# Patient Record
Sex: MALE | Race: WHITE | NOT HISPANIC OR LATINO | Employment: OTHER | URBAN - METROPOLITAN AREA
[De-identification: names, ages, dates, MRNs, and addresses within clinical notes are randomized per-mention and may not be internally consistent; named-entity substitution may affect disease eponyms.]

---

## 2017-04-13 ENCOUNTER — OFFICE VISIT (OUTPATIENT)
Dept: DERMATOLOGY | Facility: CLINIC | Age: 82
End: 2017-04-13

## 2017-04-13 DIAGNOSIS — D48.5 NEOPLASM OF UNCERTAIN BEHAVIOR OF SKIN: Primary | ICD-10-CM

## 2017-04-13 DIAGNOSIS — L81.4 SOLAR LENTIGO: ICD-10-CM

## 2017-04-13 DIAGNOSIS — L90.5 SCAR: ICD-10-CM

## 2017-04-13 DIAGNOSIS — Z85.828 HISTORY OF BASAL CELL CARCINOMA: ICD-10-CM

## 2017-04-13 DIAGNOSIS — L82.1 SEBORRHEIC KERATOSIS: ICD-10-CM

## 2017-04-13 PROCEDURE — 88305 TISSUE EXAM BY PATHOLOGIST: CPT | Performed by: DERMATOLOGY

## 2017-04-13 RX ORDER — LIDOCAINE HYDROCHLORIDE AND EPINEPHRINE 10; 10 MG/ML; UG/ML
3 INJECTION, SOLUTION INFILTRATION; PERINEURAL ONCE
Qty: 3 ML | Refills: 0 | OUTPATIENT
Start: 2017-04-13 | End: 2017-04-13

## 2017-04-13 ASSESSMENT — PAIN SCALES - GENERAL
PAINLEVEL: NO PAIN (0)
PAINLEVEL: NO PAIN (0)

## 2017-04-13 NOTE — MR AVS SNAPSHOT
After Visit Summary   4/13/2017    Gerard Manuel    MRN: [id number]           Patient Information     Date Of Birth          1935        Visit Information        Provider Department      4/13/2017 2:45 PM Basil Stone MD [facility] Dermatology        Today's Diagnoses     Neoplasm of uncertain behavior of skin    -  1    Scar        History of basal cell carcinoma        Seborrheic keratosis          Care Instructions    Sun Protection    We recommend daily use of sunscreen of SPF 30 or higher, reapplied every 2 hours when outside or sooner if swimming or sweating. It is best to try to avoid prolonged midday sun whenever possibly, and wear wide-brimmed hats, long-sleeved shirts, particular when outdoors for extended time periods.      Wound Care After a Biopsy    What is a skin biopsy?  A skin biopsy allows the doctor to examine a very small piece of tissue under the microscope to determine the diagnosis and the best treatment for the skin condition. A local anesthetic (numbing medicine)  is injected with a very small needle into the skin area to be tested. A small piece of skin is taken from the area. Sometimes a suture (stitch) is used.     What are the risks of a skin biopsy?  I will experience scar, bleeding, swelling, pain, crusting and redness. I may experience incomplete removal or recurrence. Risks of this procedure are excessive bleeding, bruising, infection, nerve damage, numbness, thick (hypertrophic or keloidal) scar and non-diagnostic biopsy.    How should I care for my wound for the first 24 hours?    Keep the wound dry and covered for 24 hours    If it bleeds, hold direct pressure on the area for 15 minutes. If bleeding does not stop then go to the emergency room    Avoid strenuous exercise the first 1-2 days or as your doctor instructs you    How should I care for the wound after 24 hours?    After 24 hours, remove the bandage    You may bathe or shower as normal    If you had  a scalp biopsy, you can shampoo as usual and can use shower water to clean the biopsy site daily    Clean the wound twice a day with gentle soap and water    Do not scrub, be gentle    Apply white petroleum/Vaseline after cleaning the wound with a cotton swab or a clean finger, and keep the site covered with a Bandaid /bandage. Bandages are not necessary with a scalp biopsy    If you are unable to cover the site with a Bandaid /bandage, re-apply ointment 2-3 times a day to keep the site moist. Moisture will help with healing    Avoid strenuous activity for first 1-2 days    Avoid lakes, rivers, pools, and oceans until the stitches are removed or the site is healed    How do I clean my wound?    Wash hands for 15 minutes with soap or use hand  before all wound care    Clean the wound with gentle soap and water    Apply white petroleum/Vaseline  to wound after it is clean    Replace the Bandaid /bandage to keep the wound covered for the first few days or as instructed by your doctor    If you had a scalp biopsy, warm shower water to the area on a daily basis should suffice    What should I use to clean my wound?     Cotton-tipped applicators (Qtips )    White petroleum jelly (Vaseline ). Use a clean new container and use Q-tips to apply.    Bandaids   as needed    Gentle soap     How should I care for my wound long term?    Do not get your wound dirty    Keep up with wound care for one week or until the area is healed.    A small scab will form and fall off by itself when the area is completely healed. The area will be red and will become pink in color as it heals. Sun protection is very important for how your scar will turn out. Sunscreen with an SPF 30 or greater is recommended once the area is healed.    If you have stitches, stitches need to be removed in 0 days. You may return to our clinic for this or you may have it done locally at your doctor s office.    You should have some soreness but it should be  mild and slowly go away over several days. Talk to your doctor about using tylenol for pain,    When should I call my doctor?  If you have increased:     Pain or swelling    Pus or drainage (clear or slightly yellow drainage is ok)    Temperature over 100F    Spreading redness or warmth around wound    When will I hear about my results?  The biopsy results can take 2-3 weeks to come back. The clinic will call you with the results, send you a [employer] message, or have you schedule a follow-up clinic or phone time to discuss the results. Contact our clinics if you do not hear from us in 3 weeks.     Who should I call with questions?    [facility]: [phone]     [facility]: [phone]    For urgent needs outside of business hours call the [facility] at [phone] and ask for the dermatology resident on call            Follow-ups after your visit        Follow-up notes from your care team     Return in about 6 months (around 10/13/2017).      Who to contact     Please call your clinic at [phone] to:    Ask questions about your health    Make or cancel appointments    Discuss your medicines    Learn about your test results    Speak to your doctor   If you have compliments or concerns about an experience at your clinic, or if you wish to file a complaint, please contact [facility] Patient Relations at [phone] or email us at [email or web address]         Additional Information About Your Visit        [employer] Information     [employer] is an electronic gateway that provides easy, online access to your medical records. With [employer], you can request a clinic appointment, read your test results, renew a prescription or communicate with your care team.     To sign up for [employer] visit the website at [email or web address]   You will be asked to enter the access code listed below, as well as some  personal information. Please follow the directions to create your username and password.     Your access code is: [email or web address]  Expires: 2017  6:30 AM     Your access code will  in 90 days. If you need help or a new code, please contact your [facility] or call [phone] for assistance.        Care EveryWhere ID     This is your Care EveryWhere ID. This could be used by other organizations to access your [facility] medical records  [id number]         Blood Pressure from Last 3 Encounters:   10/07/15 113/69    Weight from Last 3 Encounters:   No data found for Wt              We Performed the Following     BIOPSY SKIN/SUBQ/MUC MEM, SINGLE LESION     Surgical pathology exam          Today's Medication Changes          These changes are accurate as of: 17  3:53 PM.  If you have any questions, ask your nurse or doctor.               Start taking these medicines.        Dose/Directions    lidocaine 1% with EPINEPHrine 1:100,000 1 %-1:153396 injection   Used for:  Neoplasm of uncertain behavior of skin   Started by:  Basil Stone MD        Dose:  3 mL   Inject 3 mLs into the skin once for 1 dose   Quantity:  3 mL   Refills:  0            Where to get your medicines      Some of these will need a paper prescription and others can be bought over the counter.  Ask your nurse if you have questions.     You don't need a prescription for these medications     lidocaine 1% with EPINEPHrine 1:100,000 1 %-1:839571 injection                Primary Care Provider    No Ref-Primary Verified       No address on file        Thank you!     Thank you for choosing [facility] DERMATOLOGY  for your care. Our goal is always to provide you with excellent care. Hearing back from our patients is one way we can continue to improve our services. Please take a few minutes to complete the written survey that you may receive in the mail after your visit with us. Thank you!             Your Updated  Medication List - Protect others around you: Learn how to safely use, store and throw away your medicines at www.disposemymeds.org.          This list is accurate as of: 4/13/17  3:53 PM.  Always use your most recent med list.                   Brand Name Dispense Instructions for use    aspirin 325 MG tablet      Take 81 mg by mouth daily       atenolol 25 MG tablet    TENORMIN     Take 25 mg by mouth daily       gemfibrozil 600 MG tablet    LOPID     Take by mouth 2 times daily (before meals)       lidocaine 1% with EPINEPHrine 1:100,000 1 %-1:621939 injection     3 mL    Inject 3 mLs into the skin once for 1 dose       OMEGA-3 FISH OIL PO          simvastatin 40 MG tablet    ZOCOR     Take by mouth At Bedtime

## 2017-04-13 NOTE — PROGRESS NOTES
"[facility] Dermatology Note      Dermatology Problem List:  1. H/o basal cell carcinoma  -s/p excision in 2015,   Pt previously unhappy with the scar and with difficulty breathing through that nostril S/p ILK to scar, now content  2. Seborrheic keratoses  3. NUB biopsied 4/13/17  - Left dorsal hand- irregularly shaped grey brown macule ~ 1.5 cm in diameter with grey granules on dermoscopy concerning for LM vs solar lentigo    Encounter Date: Apr 13, 2017    CC:  Chief Complaint   Patient presents with     Derm Problem     Gerard is here today for a skin check. He states \" I have multiple areas that concerns me today\"         History of Present Illness:  Mr. Gerard Manuel is a 81 year old male with a history of basal cell carcinoma, excised in 2015, who returns for a follow-up skin examination. He has was seen 6 months ago when he was noted to be in good health with no suspicious skin lesions however he was bothered by his scar. He subsequently saw Dr. Simon and had ILK to the scar. He denies any residual concerns about the scar or his breathing today.  Otherwise, he notes no new lesions and has no other skin concerns. Continues to wear sunscreen and a hat.     Past Medical History:   Patient Active Problem List   Diagnosis     Blepharitis     BCC (basal cell carcinoma), face     Inflamed seborrheic keratosis     Tinea pedis     History of basal cell carcinoma     Seborrheic keratoses, inflamed     Hypertrophic scar     Past Medical History:   Diagnosis Date     Arthritis      Blepharitis      Cataracts, both eyes      MGD (meibomian gland dysfunction)      Past Surgical History:   Procedure Laterality Date     Quadruple Bypass Surgery  1992       Family History:  - Sibling history (brother) of skin cancer (BCC)    Medications:  Current Outpatient Prescriptions   Medication Sig Dispense Refill     atenolol (TENORMIN) 25 MG tablet Take 25 mg by mouth daily       gemfibrozil (LOPID) 600 MG " tablet Take by mouth 2 times daily (before meals)       simvastatin (ZOCOR) 40 MG tablet Take by mouth At Bedtime       aspirin 325 MG tablet Take 81 mg by mouth daily       Omega-3 Fatty Acids (OMEGA-3 FISH OIL PO)        No Known Allergies      Review of Systems:  -As per HPI  -Constitutional: The patient denies fatigue, fevers, chills, unintended weight loss, and night sweats.  -HEENT: Patient denies nonhealing oral sores.  -Skin: As above in HPI. No additional skin concerns.    Physical exam:  Vitals: There were no vitals taken for this visit.  GEN: This is a well developed, well-nourished male in no acute distress, in a pleasant mood.    SKIN: Full skin, which includes the head/face, both arms, chest, back, abdomen,both legs, genitalia and/or groin buttocks, digits and/or nails, was examined.  -Nose: well-healed scar with some hypopigmentation but no nodularity or erythema.   -Right ear: Firm, flesh colored smooth papule with no erythema or crusting on the inner aspect of the sunny  -Body - numerous scattered waxy, stuck-on hyperkeratotic tan to brown papules on chest, back, upper and lower extremities, left temple, forehead  - Few uniformly pigmented brown macules on bilateral upper extremities.   - Few small, dome shaped cherry red papules on body.   - Left dorsal hand- irregularly shaped grey brown macule ~ 1.5 cm in diameter with grey granules on dermoscopy  -No other lesions of concern on areas examined.     Impression/Plan:  1. History of basal cell carcinoma. No current signs of recurrence. Pt previously unhappy with the scar and with difficulty breathing through that nostril S/p ILK to scar. He is not bothered by the appearance cosmetically and denies any difficulty breathing through that nostril today. Will not pursue additional treatment  - sun protection discussed    2. Cherry angioma(s), Multiple clinically benign nevi on the body, Seborrheic keratosis, non irritated and Solar lentigines    Sun  precaution was advised including the use of sun screens of SPF 30 or higher, sun protective clothing, and avoidance of tanning beds.    Benign nature discussed. No further interventions  Needed.     3. NUB biopsied 4/13/17  - - Left dorsal hand- irregularly shaped grey brown macule ~ 1.5 cm in diameter with grey granules on dermoscopy concerning for LM vs solar lentigo  Procedure Note: Biopsy by shave technique  The risks and benefits of the procedure were described to the patient. These include but are not limited to bleeding, infection, scar, incomplete removal, and non-diagnostic biopsy. Written informed consent was obtained. The NUB was cleansed with an alcohol pad and injected with 1% lidocaine with epinephrine buffered with sodium bicarbonate. Once anesthesia was obtained, a biopsy was performed with Gilette blade. The tissue was placed in a labeled container with formalin and sent to pathology. Hemostasis was achieved with aluminum chloride. Vaseline and a bandage were applied to the wound. The patient tolerated the procedure well and was given post biopsy care instructions.        Follow-up in 6 months, earlier for new or changing lesions.     Staff Involved:  Jessie Ibrahim MD (resident), Basil Stone MD (Staff)    I have seen and examined this patient and agree with the assessment and plan as documented in the resident's note, and was present for all procedures.    Basil Stone MD  Dermatology Attending

## 2017-04-13 NOTE — PATIENT INSTRUCTIONS
Sun Protection    We recommend daily use of sunscreen of SPF 30 or higher, reapplied every 2 hours when outside or sooner if swimming or sweating. It is best to try to avoid prolonged midday sun whenever possibly, and wear wide-brimmed hats, long-sleeved shirts, particular when outdoors for extended time periods.      Wound Care After a Biopsy    What is a skin biopsy?  A skin biopsy allows the doctor to examine a very small piece of tissue under the microscope to determine the diagnosis and the best treatment for the skin condition. A local anesthetic (numbing medicine)  is injected with a very small needle into the skin area to be tested. A small piece of skin is taken from the area. Sometimes a suture (stitch) is used.     What are the risks of a skin biopsy?  I will experience scar, bleeding, swelling, pain, crusting and redness. I may experience incomplete removal or recurrence. Risks of this procedure are excessive bleeding, bruising, infection, nerve damage, numbness, thick (hypertrophic or keloidal) scar and non-diagnostic biopsy.    How should I care for my wound for the first 24 hours?    Keep the wound dry and covered for 24 hours    If it bleeds, hold direct pressure on the area for 15 minutes. If bleeding does not stop then go to the emergency room    Avoid strenuous exercise the first 1-2 days or as your doctor instructs you    How should I care for the wound after 24 hours?    After 24 hours, remove the bandage    You may bathe or shower as normal    If you had a scalp biopsy, you can shampoo as usual and can use shower water to clean the biopsy site daily    Clean the wound twice a day with gentle soap and water    Do not scrub, be gentle    Apply white petroleum/Vaseline after cleaning the wound with a cotton swab or a clean finger, and keep the site covered with a Bandaid /bandage. Bandages are not necessary with a scalp biopsy    If you are unable to cover the site with a Bandaid /bandage,  re-apply ointment 2-3 times a day to keep the site moist. Moisture will help with healing    Avoid strenuous activity for first 1-2 days    Avoid lakes, rivers, pools, and oceans until the stitches are removed or the site is healed    How do I clean my wound?    Wash hands for 15 minutes with soap or use hand  before all wound care    Clean the wound with gentle soap and water    Apply white petroleum/Vaseline  to wound after it is clean    Replace the Bandaid /bandage to keep the wound covered for the first few days or as instructed by your doctor    If you had a scalp biopsy, warm shower water to the area on a daily basis should suffice    What should I use to clean my wound?     Cotton-tipped applicators (Qtips )    White petroleum jelly (Vaseline ). Use a clean new container and use Q-tips to apply.    Bandaids   as needed    Gentle soap     How should I care for my wound long term?    Do not get your wound dirty    Keep up with wound care for one week or until the area is healed.    A small scab will form and fall off by itself when the area is completely healed. The area will be red and will become pink in color as it heals. Sun protection is very important for how your scar will turn out. Sunscreen with an SPF 30 or greater is recommended once the area is healed.    If you have stitches, stitches need to be removed in 0 days. You may return to our clinic for this or you may have it done locally at your doctor s office.    You should have some soreness but it should be mild and slowly go away over several days. Talk to your doctor about using tylenol for pain,    When should I call my doctor?  If you have increased:     Pain or swelling    Pus or drainage (clear or slightly yellow drainage is ok)    Temperature over 100F    Spreading redness or warmth around wound    When will I hear about my results?  The biopsy results can take 2-3 weeks to come back. The clinic will call you with the results, send  you a mychart message, or have you schedule a follow-up clinic or phone time to discuss the results. Contact our clinics if you do not hear from us in 3 weeks.     Who should I call with questions?    [facility]: [phone]     [facility]: [phone]    For urgent needs outside of business hours call the [facility] at [phone] and ask for the dermatology resident on call

## 2017-04-13 NOTE — LETTER
"4/13/2017       RE: Gerard Manuel  [address] [place] [address]     Dear Colleague,    Thank you for referring your patient, Gerard Manuel, to the [facility] DERMATOLOGY at [facility]. Please see a copy of my visit note below.        Pictures taken of patient today to be placed in chart for future reference.    [facility] Dermatology Note      Dermatology Problem List:  1. H/o basal cell carcinoma  -s/p excision in 2015,   Pt previously unhappy with the scar and with difficulty breathing through that nostril S/p ILK to scar, now content  2. Seborrheic keratoses  3. NUB biopsied 4/13/17  - Left dorsal hand- irregularly shaped grey brown macule ~ 1.5 cm in diameter with grey granules on dermoscopy concerning for LM vs solar lentigo    Encounter Date: Apr 13, 2017    CC:  Chief Complaint   Patient presents with     Derm Problem     Gerard is here today for a skin check. He states \" I have multiple areas that concerns me today\"         History of Present Illness:  Mr. Gerard Manuel is a 81 year old male with a history of basal cell carcinoma, excised in 2015, who returns for a follow-up skin examination. He has was seen 6 months ago when he was noted to be in good health with no suspicious skin lesions however he was bothered by his scar. He subsequently saw Dr. Simon and had ILK to the scar. He denies any residual concerns about the scar or his breathing today.  Otherwise, he notes no new lesions and has no other skin concerns. Continues to wear sunscreen and a hat.     Past Medical History:   Patient Active Problem List   Diagnosis     Blepharitis     BCC (basal cell carcinoma), face     Inflamed seborrheic keratosis     Tinea pedis     History of basal cell carcinoma     Seborrheic keratoses, inflamed     Hypertrophic scar     Past Medical History:   Diagnosis Date     Arthritis      Blepharitis      Cataracts, both eyes      MGD " (meibomian gland dysfunction)      Past Surgical History:   Procedure Laterality Date     Quadruple Bypass Surgery  1992       Family History:  - Sibling history (brother) of skin cancer (BCC)    Medications:  Current Outpatient Prescriptions   Medication Sig Dispense Refill     atenolol (TENORMIN) 25 MG tablet Take 25 mg by mouth daily       gemfibrozil (LOPID) 600 MG tablet Take by mouth 2 times daily (before meals)       simvastatin (ZOCOR) 40 MG tablet Take by mouth At Bedtime       aspirin 325 MG tablet Take 81 mg by mouth daily       Omega-3 Fatty Acids (OMEGA-3 FISH OIL PO)        No Known Allergies      Review of Systems:  -As per HPI  -Constitutional: The patient denies fatigue, fevers, chills, unintended weight loss, and night sweats.  -HEENT: Patient denies nonhealing oral sores.  -Skin: As above in HPI. No additional skin concerns.    Physical exam:  Vitals: There were no vitals taken for this visit.  GEN: This is a well developed, well-nourished male in no acute distress, in a pleasant mood.    SKIN: Full skin, which includes the head/face, both arms, chest, back, abdomen,both legs, genitalia and/or groin buttocks, digits and/or nails, was examined.  -Nose: well-healed scar with some hypopigmentation but no nodularity or erythema.   -Right ear: Firm, flesh colored smooth papule with no erythema or crusting on the inner aspect of the sunny  -Body - numerous scattered waxy, stuck-on hyperkeratotic tan to brown papules on chest, back, upper and lower extremities, left temple, forehead  - Few uniformly pigmented brown macules on bilateral upper extremities.   - Few small, dome shaped cherry red papules on body.   - Left dorsal hand- irregularly shaped grey brown macule ~ 1.5 cm in diameter with grey granules on dermoscopy  -No other lesions of concern on areas examined.     Impression/Plan:  1. History of basal cell carcinoma. No current signs of recurrence. Pt previously unhappy with the scar and with  difficulty breathing through that nostril S/p ILK to scar. He is not bothered by the appearance cosmetically and denies any difficulty breathing through that nostril today. Will not pursue additional treatment  - sun protection discussed    2. Cherry angioma(s), Multiple clinically benign nevi on the body, Seborrheic keratosis, non irritated and Solar lentigines    Sun precaution was advised including the use of sun screens of SPF 30 or higher, sun protective clothing, and avoidance of tanning beds.    Benign nature discussed. No further interventions  Needed.     3. NUB biopsied 4/13/17  - - Left dorsal hand- irregularly shaped grey brown macule ~ 1.5 cm in diameter with grey granules on dermoscopy concerning for LM vs solar lentigo  Procedure Note: Biopsy by shave technique  The risks and benefits of the procedure were described to the patient. These include but are not limited to bleeding, infection, scar, incomplete removal, and non-diagnostic biopsy. Written informed consent was obtained. The NUB was cleansed with an alcohol pad and injected with 1% lidocaine with epinephrine buffered with sodium bicarbonate. Once anesthesia was obtained, a biopsy was performed with Gilette blade. The tissue was placed in a labeled container with formalin and sent to pathology. Hemostasis was achieved with aluminum chloride. Vaseline and a bandage were applied to the wound. The patient tolerated the procedure well and was given post biopsy care instructions.        Follow-up in 6 months, earlier for new or changing lesions.     Staff Involved:  Jessie Ibrahim MD (resident), Basil Stone MD (Staff)    I have seen and examined this patient and agree with the assessment and plan as documented in the resident's note, and was present for all procedures.    Basil Stone MD  Dermatology Attending

## 2017-04-13 NOTE — NURSING NOTE
"Dermatology Rooming Note    Gerard Manuel's goals for this visit include:   Chief Complaint   Patient presents with     Derm Problem     Gerard is here today for a skin check. He states \" I have multiple areas that concerns me today\"           DENIA Tamayo    "

## 2017-04-17 LAB — COPATH REPORT: NORMAL

## 2017-05-15 PROBLEM — D48.5 NEOPLASM OF UNCERTAIN BEHAVIOR OF SKIN: Status: ACTIVE | Noted: 2017-05-15

## 2018-02-08 ENCOUNTER — OFFICE VISIT (OUTPATIENT)
Dept: OPHTHALMOLOGY | Facility: CLINIC | Age: 83
End: 2018-02-08
Attending: OPHTHALMOLOGY
Payer: MEDICARE

## 2018-02-08 DIAGNOSIS — H01.006 BLEPHARITIS OF BOTH EYES, UNSPECIFIED EYELID, UNSPECIFIED TYPE: ICD-10-CM

## 2018-02-08 DIAGNOSIS — H01.003 BLEPHARITIS OF BOTH EYES, UNSPECIFIED EYELID, UNSPECIFIED TYPE: ICD-10-CM

## 2018-02-08 DIAGNOSIS — H25.13 AGE-RELATED NUCLEAR CATARACT OF BOTH EYES: Primary | ICD-10-CM

## 2018-02-08 PROCEDURE — 92015 DETERMINE REFRACTIVE STATE: CPT | Mod: GY,ZF

## 2018-02-08 PROCEDURE — 76519 ECHO EXAM OF EYE: CPT | Mod: ZF | Performed by: OPHTHALMOLOGY

## 2018-02-08 PROCEDURE — G0463 HOSPITAL OUTPT CLINIC VISIT: HCPCS | Mod: ZF

## 2018-02-08 ASSESSMENT — REFRACTION_MANIFEST
OS_SPHERE: -0.75
OD_ADD: +2.75
OD_AXIS: 120
OD_CYLINDER: +1.00
OS_AXIS: 145
OS_ADD: +2.75
OD_SPHERE: -1.50
OS_CYLINDER: +0.50

## 2018-02-08 ASSESSMENT — VISUAL ACUITY
METHOD: SNELLEN - LINEAR
OS_BAT_HIGH: 20/40
OS_CC: 20/30
OD_BAT_MED: 20/30
CORRECTION_TYPE: GLASSES
OD_BAT_LOW: 20/25
OS_BAT_MED: 20/40
OS_BAT_LOW: 20/30
OD_BAT_HIGH: 20/40
OD_CC: 20/25

## 2018-02-08 ASSESSMENT — REFRACTION_WEARINGRX
OD_AXIS: 115
OS_SPHERE: -1.00
OS_AXIS: 140
OS_ADD: +2.50
OS_CYLINDER: +0.50
OD_CYLINDER: +1.00
OD_SPHERE: -1.50
OD_ADD: +2.50

## 2018-02-08 ASSESSMENT — EXTERNAL EXAM - RIGHT EYE: OD_EXAM: NORMAL

## 2018-02-08 ASSESSMENT — TONOMETRY
OS_IOP_MMHG: 17
OD_IOP_MMHG: 18
IOP_METHOD: TONOPEN

## 2018-02-08 ASSESSMENT — CONF VISUAL FIELD
OD_NORMAL: 1
METHOD: COUNTING FINGERS
OS_NORMAL: 1

## 2018-02-08 ASSESSMENT — EXTERNAL EXAM - LEFT EYE: OS_EXAM: NORMAL

## 2018-02-08 ASSESSMENT — CUP TO DISC RATIO
OS_RATIO: 0.3
OD_RATIO: 0.3

## 2018-02-08 NOTE — NURSING NOTE
Chief Complaints and History of Present Illnesses   Patient presents with     Yearly Exam     HPI    Symptoms:        Frequency:  Constant       Do you have eye pain now?:  No      Comments:  Noticing that the LE has decreased va at near  Increased fatigue when reading  Lids have been bothering him more.  Has been doing warm compresses but occ.  +red spot occ that comes and goes  Ambreen Escobar COT 8:02 AM February 8, 2018

## 2018-02-08 NOTE — MR AVS SNAPSHOT
After Visit Summary   2/8/2018    Gerard Manuel    MRN: [id number]           Patient Information     Date Of Birth          1935        Visit Information        Provider Department      2/8/2018 7:45 AM Gerardo Peña MD [facility]        Today's Diagnoses     Age-related nuclear cataract of both eyes    -  1    Blepharitis of both eyes, unspecified eyelid, unspecified type           Follow-ups after your visit        Follow-up notes from your care team     Return in about 1 year (around 2/8/2019), or if symptoms worsen or fail to improve, for Annual Visit.      Your next 10 appointments already scheduled     Jul 09, 2018   Procedure with Gerardo Peña MD   [facility] PeriOP Services (--)    [address]  [place] [address]   [phone]            Jul 10, 2018 12:45 PM CDT   Post-Op with Gerardo Peña MD   Eye Clinic ([facility])    [facility] [address] [facility] [address] [place] [address]   [phone]            Jul 17, 2018  8:30 AM CDT   Post-Op with Gerardo Peña MD   Eye Clinic ([facility])    [facility]  [address] [place] [address] St   [facility] [address] [place] [address]   [phone]            Jul 23, 2018   Procedure with Gerardo Peña MD   [facility] (--)    [address]  [place] [address]   [phone]            Jul 24, 2018 10:00 AM CDT   Post-Op with Gerardo Peña MD   Eye Clinic ([facility])    [facility] [address]   [facility] [address] [place] [address]   [phone]            Jul 31, 2018  8:30 AM CDT   Post-Op with Gerardo Peña MD   Eye Clinic ([facility])    [facility] [address]   [facility] [address] [place] [address]   [phone]            Aug 21, 2018  8:30 AM CDT   Post-Op with Gerardo Peña MD   Eye Clinic ([facility]  Clinics)    [address]  [address] Clin [address]  [facility] [address]   [phone]              Who to contact     Please call your clinic at [phone] to:    Ask questions about your health    Make or cancel appointments    Discuss your medicines    Learn about your test results    Speak to your doctor            Additional Information About Your Visit        MyChart Information     [employer] is an electronic gateway that provides easy, online access to your medical records. With [employer], you can request a clinic appointment, read your test results, renew a prescription or communicate with your care team.     To sign up for [employer] visit the website at [email or web address]   You will be asked to enter the access code listed below, as well as some personal information. Please follow the directions to create your username and password.     Your access code is: [email or web address]  Expires: 2018  2:20 PM     Your access code will  in 90 days. If you need help or a new code, please contact your [facility] or call [phone] for assistance.        Care EveryWhere ID     This is your Care EveryWhere ID. This could be used by other organizations to access your [facility] medical records  [id number]         Blood Pressure from Last 3 Encounters:   10/07/15 113/69    Weight from Last 3 Encounters:   No data found for Wt              We Performed the Following     IOL Biometry w/ IOL calc OU (both eye)        Primary Care Provider Fax #    Physician No Ref-Primary [phone]       No address on file        Equal Access to Services     JOYA RED : Hadii arleth aco Somark, waaxda luqadaha, qaybta kaalmada adeegyada, wax jesus manley . So [facility] [phone].    ATENCIÓN: Si habla español, tiene a clark disposición servicios gratuitos de asistencia lingüística. Llame al [phone].    We comply with applicable  federal civil rights laws and [place] laws. We do not discriminate on the basis of race, color, national origin, age, disability, sex, sexual orientation, or gender identity.            Thank you!     Thank you for choosing EYE CLINIC  for your care. Our goal is always to provide you with excellent care. Hearing back from our patients is one way we can continue to improve our services. Please take a few minutes to complete the written survey that you may receive in the mail after your visit with us. Thank you!             Your Updated Medication List - Protect others around you: Learn how to safely use, store and throw away your medicines at www.disposemymeds.org.          This list is accurate as of 2/8/18 11:59 PM.  Always use your most recent med list.                   Brand Name Dispense Instructions for use Diagnosis    aspirin 325 MG tablet      Take 81 mg by mouth daily        atenolol 25 MG tablet    TENORMIN     Take 50 mg by mouth daily        OMEGA-3 FISH OIL PO           rivaroxaban ANTICOAGULANT 20 MG Tabs tablet    XARELTO     Take 20 mg by mouth daily (with dinner)        simvastatin 40 MG tablet    ZOCOR     Take by mouth At Bedtime

## 2018-02-08 NOTE — PROGRESS NOTES
Gerard Manuel is a 82 year old male presenting for annual exam.     Patient reports new vision changes both eyes . Noticing that he is having more difficulty seeing up close. Specifically he notes that his left eye is not as strong as his right eye at near. He notes some problems with glare/halos at night especially when driving in the rain, but this is not always present. He feels his distance vision is doing well. Eyes are sometimes irritated, however admits he is not using warm compresses as should. No eye pain.      Of note: Started xarelto in November 2017 for atrial flutter.     Ocular medications:  Warm compresses as needed     1. nuclear sclerosis Cataract both eyes,    Visually significant BAT to 20/40 both eyes    Best corrected visual acuity 20/30 both eyes    Meets threshold for surgery    2 Blepharitis both eyes   Discussed increase warm compresses, lid hygiene twice a day    Artificial tears prn     3. Posterior vitreous detachment both eyes    Asymptomatic, no retinal tears, detachment   Retinal detachment precautions discussed (return for new flashes, floaters, curtain over vision)    4. Refractive error   Manifest refraction done today     May call to schedule surgery if desired  Will do biometry today and keep on file  Dory Bhagat's contact info    Would be:  MAC / top  emmetropia    Return to clinic in 1 year, earlier for surgery as indicated by patient     Jessie Slaughter MD  Ophthalmology PGY3       ~~~~~~~~~~~~~~~~~~~~~~~~~~~~~~~~~~~~~~~~~~~~~~~~~~~~~~~~~~~~~~~~    Complete documentation of historical and exam elements from today's encounter can be found in the full encounter summary report (not reduplicated in this progress note). I personally obtained the chief complaint(s) and history of present illness.  I confirmed and edited as necessary the review of systems, past medical/surgical history, family history, social history, and examination findings as documented by others.  I examined the  patient myself, and I personally reviewed the relevant tests, images, and reports as documented above. I formulated and edited as necessary the assessment and plan and discussed the findings and management plan with the patient and family.     Gerardo Peña MD, MA  Director, Cornea & Anterior Segment  [facility] ER Department of Ophthalmology & Visual Neuroscience

## 2018-05-07 ENCOUNTER — TELEPHONE (OUTPATIENT)
Dept: OPHTHALMOLOGY | Facility: CLINIC | Age: 83
End: 2018-05-07

## 2018-05-07 DIAGNOSIS — H25.13 NUCLEAR SENILE CATARACT OF BOTH EYES: Primary | ICD-10-CM

## 2018-06-06 ENCOUNTER — TELEPHONE (OUTPATIENT)
Dept: OPHTHALMOLOGY | Facility: CLINIC | Age: 83
End: 2018-06-06

## 2018-06-08 ENCOUNTER — TELEPHONE (OUTPATIENT)
Dept: OPHTHALMOLOGY | Facility: CLINIC | Age: 83
End: 2018-06-08

## 2018-06-08 NOTE — TELEPHONE ENCOUNTER
Health Call Center    Phone Message    May a detailed message be left on voicemail: yes    Reason for Call: Other: Pt is returning a phone call regarding pt's questions about lens that they will be putting in for pt's cataract removal. Please call Pt on his cell phone. Pt will try to answer call all day today and monday. Please leave a detailed vm if no answer.      Action Taken: Message routed to:  [facility] ([facility] [[email or web address]] O[facility] ADULT [facility]

## 2018-06-08 NOTE — TELEPHONE ENCOUNTER
Note to dr. Cooley who left message with pt earlier to discuss multifocal lens  Per Wilson RN 9:55 AM 06/08/18    See attached message below

## 2018-06-11 ENCOUNTER — TELEPHONE (OUTPATIENT)
Dept: OPHTHALMOLOGY | Facility: CLINIC | Age: 83
End: 2018-06-11

## 2018-06-11 NOTE — TELEPHONE ENCOUNTER
Discussed option of multifocal lens with patient.  He would be a good candidate based on the last exam note.  Discussed the benefits and limitations of the lenses.  He is interested and would like to learn more about the cost of the lens.  Would plan for Symfony if interested.    Cesar Cooley, DO  Cornea Fellow

## 2018-06-18 ENCOUNTER — TELEPHONE (OUTPATIENT)
Dept: OPHTHALMOLOGY | Facility: CLINIC | Age: 83
End: 2018-06-18

## 2018-07-02 DIAGNOSIS — H25.12 NUCLEAR SENILE CATARACT OF LEFT EYE: Primary | ICD-10-CM

## 2018-07-02 RX ORDER — KETOROLAC TROMETHAMINE 5 MG/ML
1 SOLUTION OPHTHALMIC 4 TIMES DAILY
Qty: 1 BOTTLE | Refills: 0 | Status: SHIPPED | OUTPATIENT
Start: 2018-07-02 | End: 2019-03-05

## 2018-07-02 RX ORDER — PREDNISOLONE ACETATE 10 MG/ML
1 SUSPENSION/ DROPS OPHTHALMIC 4 TIMES DAILY
Qty: 1 BOTTLE | Refills: 0 | Status: SHIPPED | OUTPATIENT
Start: 2018-07-02 | End: 2019-03-05

## 2018-07-02 RX ORDER — OFLOXACIN 3 MG/ML
1 SOLUTION/ DROPS OPHTHALMIC 4 TIMES DAILY
Qty: 1 BOTTLE | Refills: 0 | Status: SHIPPED | OUTPATIENT
Start: 2018-07-02 | End: 2019-03-05

## 2018-07-09 ENCOUNTER — SURGERY (OUTPATIENT)
Age: 83
End: 2018-07-09

## 2018-07-09 ENCOUNTER — HOSPITAL ENCOUNTER (OUTPATIENT)
Facility: CLINIC | Age: 83
Discharge: HOME | End: 2018-07-09
Attending: OPHTHALMOLOGY | Admitting: OPHTHALMOLOGY
Payer: MEDICARE

## 2018-07-09 ENCOUNTER — ANESTHESIA EVENT (OUTPATIENT)
Dept: SURGERY | Facility: CLINIC | Age: 83
End: 2018-07-09
Payer: MEDICARE

## 2018-07-09 ENCOUNTER — ANESTHESIA (OUTPATIENT)
Dept: SURGERY | Facility: CLINIC | Age: 83
End: 2018-07-09
Payer: MEDICARE

## 2018-07-09 VITALS
WEIGHT: 171 LBS | RESPIRATION RATE: 15 BRPM | BODY MASS INDEX: 23.94 KG/M2 | SYSTOLIC BLOOD PRESSURE: 113 MMHG | HEIGHT: 71 IN | OXYGEN SATURATION: 95 % | DIASTOLIC BLOOD PRESSURE: 69 MMHG | TEMPERATURE: 97.4 F

## 2018-07-09 DIAGNOSIS — H25.12 NUCLEAR SCLEROTIC CATARACT OF LEFT EYE: Primary | ICD-10-CM

## 2018-07-09 PROCEDURE — 25000128 H RX IP 250 OP 636: Performed by: OPHTHALMOLOGY

## 2018-07-09 PROCEDURE — 25000128 H RX IP 250 OP 636: Performed by: ANESTHESIOLOGY

## 2018-07-09 PROCEDURE — 36000101 ZZH EYE SURGERY LEVEL 3 1ST 30 MIN: Performed by: OPHTHALMOLOGY

## 2018-07-09 PROCEDURE — 37000009 ZZH ANESTHESIA TECHNICAL FEE, EACH ADDTL 15 MIN: Performed by: OPHTHALMOLOGY

## 2018-07-09 PROCEDURE — 25000125 ZZHC RX 250: Performed by: ANESTHESIOLOGY

## 2018-07-09 PROCEDURE — 36000102 ZZH EYE SURGERY LEVEL 3 EA 15 ADDTL MIN: Performed by: OPHTHALMOLOGY

## 2018-07-09 PROCEDURE — 25000128 H RX IP 250 OP 636: Performed by: NURSE ANESTHETIST, CERTIFIED REGISTERED

## 2018-07-09 PROCEDURE — V2632 POST CHMBR INTRAOCULAR LENS: HCPCS | Performed by: OPHTHALMOLOGY

## 2018-07-09 PROCEDURE — 25000125 ZZHC RX 250: Performed by: OPHTHALMOLOGY

## 2018-07-09 PROCEDURE — 40000170 ZZH STATISTIC PRE-PROCEDURE ASSESSMENT II: Performed by: OPHTHALMOLOGY

## 2018-07-09 PROCEDURE — 27210794 ZZH OR GENERAL SUPPLY STERILE: Performed by: OPHTHALMOLOGY

## 2018-07-09 PROCEDURE — 25000125 ZZHC RX 250: Performed by: NURSE ANESTHETIST, CERTIFIED REGISTERED

## 2018-07-09 PROCEDURE — 71000028 ZZH EYE RECOVERY PHASE 2 EACH 15 MINS: Performed by: OPHTHALMOLOGY

## 2018-07-09 PROCEDURE — 37000008 ZZH ANESTHESIA TECHNICAL FEE, 1ST 30 MIN: Performed by: OPHTHALMOLOGY

## 2018-07-09 DEVICE — EYE IMP IOL AMO PCL TECNIS ZCB00 18.5: Type: IMPLANTABLE DEVICE | Site: EYE | Status: FUNCTIONAL

## 2018-07-09 RX ORDER — CYCLOPENTOLATE HYDROCHLORIDE 10 MG/ML
1 SOLUTION/ DROPS OPHTHALMIC
Status: COMPLETED | OUTPATIENT
Start: 2018-07-09 | End: 2018-07-09

## 2018-07-09 RX ORDER — ONDANSETRON 4 MG/1
4 TABLET, ORALLY DISINTEGRATING ORAL EVERY 30 MIN PRN
Status: CANCELLED | OUTPATIENT
Start: 2018-07-09

## 2018-07-09 RX ORDER — HYDROMORPHONE HYDROCHLORIDE 1 MG/ML
.3-.5 INJECTION, SOLUTION INTRAMUSCULAR; INTRAVENOUS; SUBCUTANEOUS EVERY 10 MIN PRN
Status: CANCELLED | OUTPATIENT
Start: 2018-07-09

## 2018-07-09 RX ORDER — SODIUM CHLORIDE, SODIUM LACTATE, POTASSIUM CHLORIDE, CALCIUM CHLORIDE 600; 310; 30; 20 MG/100ML; MG/100ML; MG/100ML; MG/100ML
INJECTION, SOLUTION INTRAVENOUS CONTINUOUS
Status: DISCONTINUED | OUTPATIENT
Start: 2018-07-09 | End: 2018-07-09 | Stop reason: HOSPADM

## 2018-07-09 RX ORDER — FENTANYL CITRATE 50 UG/ML
25-50 INJECTION, SOLUTION INTRAMUSCULAR; INTRAVENOUS
Status: CANCELLED | OUTPATIENT
Start: 2018-07-09

## 2018-07-09 RX ORDER — PHENYLEPHRINE HYDROCHLORIDE 25 MG/ML
1 SOLUTION/ DROPS OPHTHALMIC
Status: COMPLETED | OUTPATIENT
Start: 2018-07-09 | End: 2018-07-09

## 2018-07-09 RX ORDER — FENTANYL CITRATE 50 UG/ML
INJECTION, SOLUTION INTRAMUSCULAR; INTRAVENOUS PRN
Status: DISCONTINUED | OUTPATIENT
Start: 2018-07-09 | End: 2018-07-09

## 2018-07-09 RX ORDER — NALOXONE HYDROCHLORIDE 0.4 MG/ML
.1-.4 INJECTION, SOLUTION INTRAMUSCULAR; INTRAVENOUS; SUBCUTANEOUS
Status: CANCELLED | OUTPATIENT
Start: 2018-07-09 | End: 2018-07-10

## 2018-07-09 RX ORDER — BALANCED SALT SOLUTION 6.4; .75; .48; .3; 3.9; 1.7 MG/ML; MG/ML; MG/ML; MG/ML; MG/ML; MG/ML
SOLUTION OPHTHALMIC PRN
Status: DISCONTINUED | OUTPATIENT
Start: 2018-07-09 | End: 2018-07-09 | Stop reason: HOSPADM

## 2018-07-09 RX ORDER — PROPARACAINE HYDROCHLORIDE 5 MG/ML
1 SOLUTION/ DROPS OPHTHALMIC ONCE
Status: COMPLETED | OUTPATIENT
Start: 2018-07-09 | End: 2018-07-09

## 2018-07-09 RX ORDER — LIDOCAINE HYDROCHLORIDE 10 MG/ML
INJECTION, SOLUTION EPIDURAL; INFILTRATION; INTRACAUDAL; PERINEURAL PRN
Status: DISCONTINUED | OUTPATIENT
Start: 2018-07-09 | End: 2018-07-09 | Stop reason: HOSPADM

## 2018-07-09 RX ORDER — TROPICAMIDE 10 MG/ML
1 SOLUTION/ DROPS OPHTHALMIC
Status: COMPLETED | OUTPATIENT
Start: 2018-07-09 | End: 2018-07-09

## 2018-07-09 RX ORDER — SODIUM CHLORIDE, SODIUM LACTATE, POTASSIUM CHLORIDE, CALCIUM CHLORIDE 600; 310; 30; 20 MG/100ML; MG/100ML; MG/100ML; MG/100ML
INJECTION, SOLUTION INTRAVENOUS CONTINUOUS
Status: CANCELLED | OUTPATIENT
Start: 2018-07-09

## 2018-07-09 RX ORDER — TETRACAINE HYDROCHLORIDE 5 MG/ML
SOLUTION OPHTHALMIC PRN
Status: DISCONTINUED | OUTPATIENT
Start: 2018-07-09 | End: 2018-07-09 | Stop reason: HOSPADM

## 2018-07-09 RX ORDER — ONDANSETRON 2 MG/ML
INJECTION INTRAMUSCULAR; INTRAVENOUS PRN
Status: DISCONTINUED | OUTPATIENT
Start: 2018-07-09 | End: 2018-07-09

## 2018-07-09 RX ORDER — ONDANSETRON 2 MG/ML
4 INJECTION INTRAMUSCULAR; INTRAVENOUS EVERY 30 MIN PRN
Status: CANCELLED | OUTPATIENT
Start: 2018-07-09

## 2018-07-09 RX ADMIN — CYCLOPENTOLATE HYDROCHLORIDE 1 DROP: 10 SOLUTION/ DROPS OPHTHALMIC at 10:30

## 2018-07-09 RX ADMIN — FENTANYL CITRATE 50 MCG: 50 INJECTION, SOLUTION INTRAMUSCULAR; INTRAVENOUS at 11:40

## 2018-07-09 RX ADMIN — SODIUM CHLORIDE, POTASSIUM CHLORIDE, SODIUM LACTATE AND CALCIUM CHLORIDE: 600; 310; 30; 20 INJECTION, SOLUTION INTRAVENOUS at 11:09

## 2018-07-09 RX ADMIN — CYCLOPENTOLATE HYDROCHLORIDE 1 DROP: 10 SOLUTION/ DROPS OPHTHALMIC at 10:38

## 2018-07-09 RX ADMIN — TETRACAINE HYDROCHLORIDE 1 DROP: 5 SOLUTION OPHTHALMIC at 11:56

## 2018-07-09 RX ADMIN — TROPICAMIDE 1 DROP: 10 SOLUTION/ DROPS OPHTHALMIC at 10:34

## 2018-07-09 RX ADMIN — SODIUM CHONDROITIN SULFATE / SODIUM HYALURONATE 1 ML: 0.55-0.5 INJECTION INTRAOCULAR at 11:56

## 2018-07-09 RX ADMIN — LIDOCAINE HYDROCHLORIDE 1 ML: 10 INJECTION, SOLUTION EPIDURAL; INFILTRATION; INTRACAUDAL; PERINEURAL at 11:55

## 2018-07-09 RX ADMIN — EPINEPHRINE 500 ML: 1 INJECTION, SOLUTION, CONCENTRATE INTRAVENOUS at 11:55

## 2018-07-09 RX ADMIN — BALANCED SALT SOLUTION 15 ML: 6.4; .75; .48; .3; 3.9; 1.7 SOLUTION OPHTHALMIC at 11:55

## 2018-07-09 RX ADMIN — TROPICAMIDE 1 DROP: 10 SOLUTION/ DROPS OPHTHALMIC at 10:38

## 2018-07-09 RX ADMIN — PROPARACAINE HYDROCHLORIDE 1 DROP: 5 SOLUTION/ DROPS OPHTHALMIC at 10:30

## 2018-07-09 RX ADMIN — ONDANSETRON 4 MG: 2 INJECTION INTRAMUSCULAR; INTRAVENOUS at 12:00

## 2018-07-09 RX ADMIN — LIDOCAINE HYDROCHLORIDE 1 ML: 10 INJECTION, SOLUTION EPIDURAL; INFILTRATION; INTRACAUDAL; PERINEURAL at 10:41

## 2018-07-09 RX ADMIN — PHENYLEPHRINE HYDROCHLORIDE 1 DROP: 2.5 SOLUTION/ DROPS OPHTHALMIC at 10:30

## 2018-07-09 RX ADMIN — PHENYLEPHRINE HYDROCHLORIDE 1 DROP: 2.5 SOLUTION/ DROPS OPHTHALMIC at 10:38

## 2018-07-09 RX ADMIN — DEXMEDETOMIDINE HYDROCHLORIDE 12 MCG: 100 INJECTION, SOLUTION INTRAVENOUS at 11:37

## 2018-07-09 RX ADMIN — PHENYLEPHRINE HYDROCHLORIDE 1 DROP: 2.5 SOLUTION/ DROPS OPHTHALMIC at 10:34

## 2018-07-09 RX ADMIN — CYCLOPENTOLATE HYDROCHLORIDE 1 DROP: 10 SOLUTION/ DROPS OPHTHALMIC at 10:34

## 2018-07-09 RX ADMIN — TROPICAMIDE 1 DROP: 10 SOLUTION/ DROPS OPHTHALMIC at 10:30

## 2018-07-09 RX ADMIN — LIDOCAINE HYDROCHLORIDE 2 DROP: 35 GEL OPHTHALMIC at 12:04

## 2018-07-09 RX ADMIN — DEXMEDETOMIDINE HYDROCHLORIDE 8 MCG: 100 INJECTION, SOLUTION INTRAVENOUS at 11:52

## 2018-07-09 ASSESSMENT — ENCOUNTER SYMPTOMS: DYSRHYTHMIAS: 1

## 2018-07-09 NOTE — OP NOTE
OPHTHALMOLOGY OPERATIVE REPORT    PATIENT NAME: Gerard Manuel  DATE: 7/9/2018    SURGEON:  Gerardo Peña MD  ASSISTANT  Moise Mario MD    PREOP DIAGNOSIS: visually significant nuclear sclerosis cataract left eye  POSTOP DIAGNOSIS: same    ANESTHESIA: MAC topical  COMPLICATIONS: none    LENS SPECIFICATIONS: ZCB00 18.5 Diopters    INDICATION FOR PROCEDURE  The patient has a visually-significant cataract that has been affecting their activities of daily living. The risks including, but not limited to infection, loss of vision, loss of eye, need for more surgery, and bleeding, along with the benefits, alternatives, expectations, and the procedure itself were discussed at length with the patient who wished to proceed with surgery.    DESCRIPTION OF PROCEDURE  In the preoperative suite, the patient was identified, the surgical site marked and informed consent was obtained. The patient was then brought back to the operative suite where the appropriate anesthesia monitors were connected. The patient's eye was prepped and draped in the usual sterile fashion for ophthalmic surgery.    A scleral fixation ring and a supersharp blade were used to make a paracentesis incision. Aqueous was replaced with preservative free lidocaine followed by viscoat. A 2.8 mm keratome was used to make a temporal, triplanar clear corneal incision. Capsulorrhexis was completed with a bent cystitome and Utrata forceps. Hydrodissection of the nucleus was achieved with the Ayers cannula. The nucleus was found to move freely within the capsular bag. The cataract was successfully removed.    The irrigation and aspiration handpiece was used to remove the cortex. The capsular bag was filled with Healon. The intraocular lens was injected into the capsular bag. The remaining viscoelastic was removed using irrigation and aspiration. The lens was found to be well-centered and in the capsular bag. BSS on a cannula was used to hydrate the clear corneal  and paracentesis incisions. Weck-razia sponges were used to confirm there were no leaks from the incisions.    A shield was taped over the eye. The patient was then taken to the recovery room in stable condition having tolerated the procedure well and discharged home in good condition.    Dr. Gerardo Peña was scrubbed and present for the entire case.

## 2018-07-09 NOTE — ANESTHESIA POSTPROCEDURE EVALUATION
Patient: Gerard Manuel    Procedure(s):  LEFT EYE PHACOEMULSIFICATION CLEAR CORNEA WITH STANDARD INTRAOCULAR LENS IMPLANT  - Wound Class: I-Clean    Diagnosis:CATARACT LEFT EYE   Diagnosis Additional Information: No value filed.    Anesthesia Type:  MAC    Note:  Anesthesia Post Evaluation    Patient location during evaluation: bedside  Patient participation: Able to fully participate in evaluation  Level of consciousness: awake  Pain management: adequate  Airway patency: patent  Cardiovascular status: acceptable  Respiratory status: acceptable  Hydration status: acceptable  PONV: none     Anesthetic complications: None          Last vitals:  Vitals:    07/09/18 1035 07/09/18 1212 07/09/18 1222   BP: 126/79 110/85 113/69   Resp: 18 15 15   Temp: 36.3  C (97.4  F)     SpO2: 98% 95% 95%         Electronically Signed By: See Severino DO, DO  July 9, 2018  1:53 PM

## 2018-07-09 NOTE — IP AVS SNAPSHOT
MRN:[id number]                      After Visit Summary   7/9/2018    Gerard Manuel    MRN: [id number]           Thank you!     Thank you for choosing [facility] for your care. Our goal is always to provide you with excellent care. Hearing back from our patients is one way we can continue to improve our services. Please take a few minutes to complete the written survey that you may receive in the mail after you visit with us. Thank you!        Patient Information     Date Of Birth          1935        About your hospital stay     You were admitted on:  July 9, 2018 You last received care in the:  [facility]    You were discharged on:  July 9, 2018       Who to Call     For medical emergencies, please call 911.  For non-urgent questions about your medical care, please call your primary care provider or clinic, [phone]  For questions related to your surgery, please call your surgery clinic        Attending Provider     Provider Specialty    Gerardo Peña MD Ophthalmology       Primary Care Provider Office Phone # Fax #    Shelton Palomino [phone]      Your next 10 appointments already scheduled     Jul 10, 2018 12:45 PM CDT   Post-Op with Gerardo Peña MD   Eye Clinic ([facility])    [address]  [facility] 9a  [facility] [address]   [phone]            Jul 17, 2018  8:30 AM CDT   Post-Op with Gerardo Peña MD   Eye Clinic ([facility])    [address]  [facility] 9[facility]  [facility] [address]   [phone]            Jul 23, 2018   Procedure with Gerardo Peña MD   [facility] PeriOP Services (--)    [address]  [facility] [address]   [phone]            Jul 24, 2018 10:00 AM CDT   Post-Op with Gerardo Peña MD   Eye Clinic ([facility])    [address] Mart[address]nez [address]  [address]  [address] Clin [address] [place] [address]   [phone]            Jul 31, 2018  8:30 AM CDT   Post-Op with Gerardo Peña MD   Eye Clinic ([facility])    [address] [address]   [phone]            Aug 21, 2018  8:30 AM CDT   Post-Op with Gerardo Peña MD   Eye Clinic ([facility])    [address] [address]   [phone]              Further instructions from your care team       [facility] Discharge  Instructions  Anesthesia (Eye Care Center)   Adult Discharge Instructions    For 24 hours after surgery    1. Get plenty of rest.  Make arrangements to have a responsible adult stay with you for at least 24 hours after you leave the hospital.  2. Do not drive or use heavy equipment for 24 hours.    3. Do not drink alcohol for 24 hours.  4. Do not sign legal documents or make important decisions for 24 hours.  5. Avoid strenuous or risky activities. You may feel lightheaded.  If so, sit for a few minutes before standing.  Have someone help you get up.   6. Conscious sedation patients may resume a regular diet..  7. Any questions of medical nature, call your physician.    [facility]  Post Operative Cataract Instructions    Do not rub the eye, keep the eye shield over the operated eye at night for one week.    Start taking eye drops today. Wait 3-4 minutes between drops.     Ocuflox 4x/day to surgical eye   Ketorolac 4x/day to surgical eye   Prednisolone 4x/day to surgical eye    No heavy lifting, no bending down at the waist, no water in the eye.    Take tylenol as directed on the bottle if you have pain.    Please call [phone] if you develop severe pain, decreased vision, redness, or severe sensitivity to light.    Bring your eye drops to your appointment tomorrow.    You have a follow-up appointment with  "your doctor tomorrow at the [facility] Eye clinic.         Pending Results     No orders found from 2018 to 7/10/2018.            Admission Information     Date & Time Provider Department Dept. Phone    2018 Page, Gerardo Browning MD [facility] [phone]      Your Vitals Were     Blood Pressure Temperature Respirations Height Weight Pulse Oximetry    110/85 97.4  F (36.3  C) (Temporal) 15 1.791 m (5' 10.5\") 77.6 kg (171 lb) 95%    BMI (Body Mass Index)                   24.19 kg/m2           MyChart Information     [employer] lets you send messages to your doctor, view your test results, renew your prescriptions, schedule appointments and more. To sign up, go to [email or web address] . Click on \"Log in\" on the left side of the screen, which will take you to the Welcome page. Then click on \"Sign up Now\" on the right side of the page.     You will be asked to enter the access code listed below, as well as some personal information. Please follow the directions to create your username and password.     Your access code is: [email or web address]  Expires: 2018  2:20 PM     Your access code will  in 90 days. If you need help or a new code, please call your [facility] clinic or [phone].        Care EveryWhere ID     This is your Care EveryWhere ID. This could be used by other organizations to access your [facility] medical records  [id number]        Equal Access to Services     POLLO RED AH: Hadii aad ku hadasho Soomaali, waaxda luqadaha, qaybta kaalmada adeegyada, jazmyne owusu. So [facility] [phone].    ATENCIÓN: Si larryla vida, tiene a clark disposición servicios gratuitos de asistencia lingüística. Llame al [phone].    We comply with applicable federal civil rights laws and [place] laws. We do not discriminate on the basis of race, color, national origin, age, disability, sex, sexual orientation, or gender identity.               Review of " your medicines      UNREVIEWED medicines. Ask your doctor about these medicines        Dose / Directions    aspirin 325 MG tablet        Dose:  81 mg   Take 81 mg by mouth daily   Refills:  0       atenolol 25 MG tablet   Commonly known as:  TENORMIN        Dose:  50 mg   Take 50 mg by mouth daily   Refills:  0       ketorolac 0.5 % ophthalmic solution   Commonly known as:  ACULAR   Used for:  Nuclear senile cataract of left eye        Dose:  1 drop   Place 1 drop Into the left eye 4 times daily   Quantity:  1 Bottle   Refills:  0       ofloxacin 0.3 % ophthalmic solution   Commonly known as:  OCUFLOX   Used for:  Nuclear senile cataract of left eye        Dose:  1 drop   Place 1 drop Into the left eye 4 times daily   Quantity:  1 Bottle   Refills:  0       OMEGA-3 FISH OIL PO        Refills:  0       prednisoLONE acetate 1 % ophthalmic susp   Commonly known as:  PRED FORTE   Used for:  Nuclear senile cataract of left eye        Dose:  1 drop   Place 1 drop Into the left eye 4 times daily   Quantity:  1 Bottle   Refills:  0       rivaroxaban ANTICOAGULANT 20 MG Tabs tablet   Commonly known as:  XARELTO        Dose:  20 mg   Take 20 mg by mouth daily (with dinner)   Refills:  0       simvastatin 40 MG tablet   Commonly known as:  ZOCOR        Take by mouth At Bedtime   Refills:  0                Protect others around you: Learn how to safely use, store and throw away your medicines at www.disposemymeds.org.             Medication List: This is a list of all your medications and when to take them. Check marks below indicate your daily home schedule. Keep this list as a reference.      Medications           Morning Afternoon Evening Bedtime As Needed    aspirin 325 MG tablet   Take 81 mg by mouth daily                                atenolol 25 MG tablet   Commonly known as:  TENORMIN   Take 50 mg by mouth daily                                ketorolac 0.5 % ophthalmic solution   Commonly known as:  ACULAR   Place 1  drop Into the left eye 4 times daily                                ofloxacin 0.3 % ophthalmic solution   Commonly known as:  OCUFLOX   Place 1 drop Into the left eye 4 times daily   Last time this was given:  1 drop on 7/9/2018 12:04 PM                                OMEGA-3 FISH OIL PO                                prednisoLONE acetate 1 % ophthalmic susp   Commonly known as:  PRED FORTE   Place 1 drop Into the left eye 4 times daily                                rivaroxaban ANTICOAGULANT 20 MG Tabs tablet   Commonly known as:  XARELTO   Take 20 mg by mouth daily (with dinner)                                simvastatin 40 MG tablet   Commonly known as:  ZOCOR   Take by mouth At Bedtime

## 2018-07-09 NOTE — ANESTHESIA CARE TRANSFER NOTE
Patient: Gerard Manuel    Procedure(s):  LEFT EYE PHACOEMULSIFICATION CLEAR CORNEA WITH STANDARD INTRAOCULAR LENS IMPLANT  - Wound Class: I-Clean    Diagnosis: CATARACT LEFT EYE   Diagnosis Additional Information: No value filed.    Anesthesia Type:   MAC     Note:  Airway :Room Air  Patient transferred to:PACU  Handoff Report: Identifed the Patient, Identified the Reponsible Provider, Reviewed the pertinent medical history, Discussed the surgical course, Reviewed Intra-OP anesthesia mangement and issues during anesthesia, Set expectations for post-procedure period and Allowed opportunity for questions and acknowledgement of understanding    Transferred to Eye Center recovery room in recliner with armrests up, spontaneous respirations, O2 saturation maintained greater than 92% with oxygen via room air. All monitors and alarms on and functioning, clinically stable vital signs. Report given to recovery RN and questions answered. Patient alert and following verbal directions.    Electronically Signed By: RENÉE Batista CRNA  July 9, 2018  12:15 PM

## 2018-07-09 NOTE — ANESTHESIA PREPROCEDURE EVALUATION
Anesthesia Evaluation     .             ROS/MED HX    ENT/Pulmonary:      (-) sleep apnea   Neurologic:  - neg neurologic ROS     Cardiovascular: Comment: H/o CABG 1992  Afib/Flutter s/p ablation. Now on atenolol and xarelto  Pacemaker    Stress test 10/17- Normal EF. No ischemia    (+) hypertension--CAD, -CABG-date: 1992, . : . . . pacemaker :. dysrhythmias a-fib and a-flutter, .       METS/Exercise Tolerance:     Hematologic:         Musculoskeletal:         GI/Hepatic:        (-) GERD   Renal/Genitourinary:  - ROS Renal section negative       Endo:  - neg endo ROS       Psychiatric:  - neg psychiatric ROS       Infectious Disease:  - neg infectious disease ROS       Malignancy:         Other:                     Physical Exam  Normal systems: cardiovascular, pulmonary and dental    Airway   Mallampati: II  TM distance: >3 FB  Neck ROM: full    Dental     Cardiovascular       Pulmonary                     Anesthesia Plan      History & Physical Review  History and physical reviewed and following examination; no interval change.    ASA Status:  3 .    NPO Status:  > 8 hours    Plan for MAC Reason for MAC:  Procedure to face, neck, head or breast  PONV prophylaxis:  Ondansetron (or other 5HT-3)       Postoperative Care      Consents  Anesthetic plan, risks, benefits and alternatives discussed with:  Patient..          Procedure: Procedure(s):  PHACOEMULSIFICATION CLEAR CORNEA WITH STANDARD INTRAOCULAR LENS IMPLANT  Preop diagnosis: CATARACT LEFT EYE     No Known Allergies  Past Medical History:   Diagnosis Date     Arthritis      Atrial flutter (H)      Blepharitis      Cataracts, both eyes      MGD (meibomian gland dysfunction)      Pacemaker      Past Surgical History:   Procedure Laterality Date     APPENDECTOMY       CARDIAC SURGERY      CABGx4     Quadruple Bypass Surgery  1992     Social History   Substance Use Topics     Smoking status: Never Smoker     Smokeless tobacco: Never Used     Alcohol use No      Prior to Admission medications    Medication Sig Start Date End Date Taking? Authorizing Provider   aspirin 325 MG tablet Take 81 mg by mouth daily   Yes Reported, Patient   atenolol (TENORMIN) 25 MG tablet Take 50 mg by mouth daily   Yes Reported, Patient   rivaroxaban ANTICOAGULANT (XARELTO) 20 MG TABS tablet Take 20 mg by mouth daily (with dinner)   Yes Reported, Patient   simvastatin (ZOCOR) 40 MG tablet Take by mouth At Bedtime   Yes Reported, Patient   ketorolac (ACULAR) 0.5 % ophthalmic solution Place 1 drop Into the left eye 4 times daily 7/2/18   Cesar Cooley, DO   ofloxacin (OCUFLOX) 0.3 % ophthalmic solution Place 1 drop Into the left eye 4 times daily 7/2/18   Cesar Cooley,    Omega-3 Fatty Acids (OMEGA-3 FISH OIL PO)     Reported, Patient   prednisoLONE acetate (PRED FORTE) 1 % ophthalmic susp Place 1 drop Into the left eye 4 times daily 7/2/18   Cesar Cooley, DO     Current Facility-Administered Medications Ordered in Epic   Medication Dose Route Frequency Last Rate Last Dose     lactated ringers infusion   Intravenous Continuous         lidocaine (AKTEN) ophthalmic gel 0.5 mL  0.5 mL Ophthalmic Once         lidocaine 1 % 1 mL  1 mL Other Q1H PRN   1 mL at 07/09/18 1041     No current [facility]-ordered outpatient prescriptions on file.       lactated ringers       Wt Readings from Last 1 Encounters:   07/02/18 77.6 kg (171 lb)     Temp Readings from Last 1 Encounters:   07/09/18 36.3  C (97.4  F) (Temporal)     BP Readings from Last 6 Encounters:   07/09/18 126/79   10/07/15 113/69     Pulse Readings from Last 4 Encounters:   10/07/15 66     Resp Readings from Last 1 Encounters:   07/09/18 18     SpO2 Readings from Last 1 Encounters:   07/09/18 98%     No results for input(s): NA, POTASSIUM, CHLORIDE, CO2, ANIONGAP, GLC, BUN, CR, AMY in the last 07166 hours.  No results for input(s): AST, ALT, ALKPHOS, BILITOTAL, LIPASE in the last 69834 hours.  No results for input(s): WBC,  HGB, PLT in the last 54383 hours.  No results for input(s): ABO, RH in the last 18699 hours.  No results for input(s): INR, PTT in the last 93424 hours.   No results for input(s): TROPI in the last 40800 hours.  No results for input(s): PH, PCO2, PO2, HCO3 in the last 72863 hours.  No results for input(s): HCG in the last 38472 hours.  No results found for this or any previous visit (from the past 744 hour(s)).    RECENT LABS:   ECG:   ECHO:                     .

## 2018-07-09 NOTE — DISCHARGE INSTRUCTIONS
[facility] Anesthesia Eye Care Center Discharge  Instructions  Anesthesia (Eye Care Center)   Adult Discharge Instructions    For 24 hours after surgery    1. Get plenty of rest.  Make arrangements to have a responsible adult stay with you for at least 24 hours after you leave the hospital.  2. Do not drive or use heavy equipment for 24 hours.    3. Do not drink alcohol for 24 hours.  4. Do not sign legal documents or make important decisions for 24 hours.  5. Avoid strenuous or risky activities. You may feel lightheaded.  If so, sit for a few minutes before standing.  Have someone help you get up.   6. Conscious sedation patients may resume a regular diet..  7. Any questions of medical nature, call your physician.    [facility]  Post Operative Cataract Instructions    Do not rub the eye, keep the eye shield over the operated eye at night for one week.    Start taking eye drops today. Wait 3-4 minutes between drops.     Ocuflox 4x/day to surgical eye   Ketorolac 4x/day to surgical eye   Prednisolone 4x/day to surgical eye    No heavy lifting, no bending down at the waist, no water in the eye.    Take tylenol as directed on the bottle if you have pain.    Please call [phone] if you develop severe pain, decreased vision, redness, or severe sensitivity to light.    Bring your eye drops to your appointment tomorrow.    You have a follow-up appointment with your doctor tomorrow at the [facility] Eye clinic.

## 2018-07-09 NOTE — IP AVS SNAPSHOT
[facility]    [address]    [place] [address]    Phone:  [phone]    Fax:  [phone]                                       After Visit Summary   7/9/2018    Gerard Manuel    MRN: [id number]           After Visit Summary Signature Page     I have received my discharge instructions, and my questions have been answered. I have discussed any challenges I see with this plan with the nurse or doctor.    ..........................................................................................................................................  Patient/Patient Representative Signature      ..........................................................................................................................................  Patient Representative Print Name and Relationship to Patient    ..................................................               ................................................  Date                                            Time    ..........................................................................................................................................  Reviewed by Signature/Title    ...................................................              ..............................................  Date                                                            Time

## 2018-07-10 ENCOUNTER — OFFICE VISIT (OUTPATIENT)
Dept: OPHTHALMOLOGY | Facility: CLINIC | Age: 83
End: 2018-07-10
Attending: OPHTHALMOLOGY
Payer: MEDICARE

## 2018-07-10 DIAGNOSIS — Z96.1 PSEUDOPHAKIA: Primary | ICD-10-CM

## 2018-07-10 PROCEDURE — G0463 HOSPITAL OUTPT CLINIC VISIT: HCPCS | Mod: ZF

## 2018-07-10 ASSESSMENT — REFRACTION_WEARINGRX
OS_CYLINDER: +0.50
OS_AXIS: 140
OD_SPHERE: -1.50
OS_ADD: +2.50
OD_ADD: +2.50
OD_CYLINDER: +1.00
OS_SPHERE: -1.00
OD_AXIS: 115

## 2018-07-10 ASSESSMENT — VISUAL ACUITY
OD_CC: 20/30
METHOD: SNELLEN - LINEAR
OS_CC+: -2
OS_PH_CC: 20/30
CORRECTION_TYPE: GLASSES
OS_CC: 20/40

## 2018-07-10 ASSESSMENT — EXTERNAL EXAM - RIGHT EYE: OD_EXAM: NORMAL

## 2018-07-10 ASSESSMENT — TONOMETRY
IOP_METHOD: ICARE
OD_IOP_MMHG: 14
OS_IOP_MMHG: 15

## 2018-07-10 ASSESSMENT — EXTERNAL EXAM - LEFT EYE: OS_EXAM: NORMAL

## 2018-07-10 ASSESSMENT — SLIT LAMP EXAM - LIDS: COMMENTS: UPPER LID DERMATOCHALASIS

## 2018-07-10 NOTE — NURSING NOTE
Chief Complaints and History of Present Illnesses   Patient presents with     Post Op (Ophthalmology) Left Eye     Nuclear senile cataract of left eye (Primary Dx)     HPI    Affected eye(s):  Left   Symptoms:     Blurred vision   No decreased vision   No floaters   No flashes      Duration:  1 day      Do you have eye pain now?:  No      Comments:  Pt. stated no eye pain and has started his drops.  Eb Ramon  1:02 PM July 10, 2018

## 2018-07-10 NOTE — PROGRESS NOTES
Assessment / Plan:    Gerard Manuel is a 82 year old male who is 1 day s/p cataract extraction with intraocular lens placement left eye.    Off to a good start. Started drops last night, 1 round last night and this morning.    Medications in the surgical eye:    prednisolone acetate 1% four times a day     Ketorolac 0.4% four times a day     Ofloxacin four times a day      Limit heavy lifting, bending over, and heavy exertion. Light aerobic activity is acceptable. Avoid swimming pools, hot tubs, or saunas for 3-4 weeks.   Wear the protective shield at night for the next seven days, and call for increased redness, discharge, pain, or decreased vision.    Return to clinic in approximately 1 week, earlier as needed.    Mira Miller MD  Cornea Fellow      ~~~~~~~~~~~~~~~~~~~~~~~~~~~~~~~~~~~~~~~~~~~~~~~~~~~~~~~~~~~~~~~~    Complete documentation of historical and exam elements from today's encounter can be found in the full encounter summary report (not reduplicated in this progress note). I personally obtained the chief complaint(s) and history of present illness.  I confirmed and edited as necessary the review of systems, past medical/surgical history, family history, social history, and examination findings as documented by others.  I examined the patient myself, and I personally reviewed the relevant tests, images, and reports as documented above. I formulated and edited as necessary the assessment and plan and discussed the findings and management plan with the patient and family.     Gerardo Peña MD, MA  Director, Cornea & Anterior Segment  [facility] Department of Ophthalmology & Visual Neuroscience

## 2018-07-10 NOTE — MR AVS SNAPSHOT
After Visit Summary   7/10/2018    Gerard Manuel    MRN: [id number]           Patient Information     Date Of Birth          1935        Visit Information        Provider Department      7/10/2018 12:45 PM Gerardo Peña MD Eye Clinic        Today's Diagnoses     Pseudophakia - Left Eye    -  1       Follow-ups after your visit        Your next 10 appointments already scheduled     Jul 17, 2018  8:30 AM CDT   Post-Op with Gerardo Peña MD   Eye Clinic ([facility])    [address] [place] [address]   [phone]            Jul 23, 2018   Procedure with Gerardo Peña MD   [facility] PeriOP Services (--)    [address]  [facility] [address]   [phone]            Jul 24, 2018 10:00 AM CDT   Post-Op with Gerardo Peña MD   Eye Clinic ([facility])    [address] [address]   [phone]            Jul 31, 2018  8:30 AM CDT   Post-Op with Gerardo Peña MD   Eye Clinic ([facility])    [address] [address]   [phone]            Aug 21, 2018  8:30 AM CDT   Post-Op with Gerardo Peña MD   Eye Clinic ([facility])    [address] [address]   [phone]              Who to contact     Please call your clinic at [phone] to:    Ask questions about your health    Make or cancel appointments    Discuss your medicines    Learn about your test results    Speak to your doctor            Additional Information About Your Visit        Care EveryWhere ID     This is your Care EveryWhere ID. This could be used by other organizations to access your [facility] medical records  [id number]         Blood Pressure from Last 3 Encounters:   07/09/18 113/69    10/07/15 113/69    Weight from Last 3 Encounters:   07/02/18 77.6 kg (171 lb)              Today, you had the following     No orders found for display       Primary Care Provider Office Phone # Fax #    Shelton Palomino [phone]       [facility]   [facility] [address]        Equal Access to Services     JOYA RED : Hadii aad ku hadasho Soomaali, waaxda luqadaha, qaybta kaalmada adeegyada, waxay idiin hayaan adeeg kharash lamario . So [facility] [phone].    ATENCIÓN: Si habla español, tiene a clark disposición servicios gratuitos de asistencia lingüística. [facility] [phone].    We comply with applicable federal civil rights laws and [place] laws. We do not discriminate on the basis of race, color, national origin, age, disability, sex, sexual orientation, or gender identity.            Thank you!     Thank you for choosing EYE CLINIC  for your care. Our goal is always to provide you with excellent care. Hearing back from our patients is one way we can continue to improve our services. Please take a few minutes to complete the written survey that you may receive in the mail after your visit with us. Thank you!             Your Updated Medication List - Protect others around you: Learn how to safely use, store and throw away your medicines at www.disposemymeds.org.          This list is accurate as of 7/10/18  2:21 PM.  Always use your most recent med list.                   Brand Name Dispense Instructions for use Diagnosis    aspirin 325 MG tablet      Take 81 mg by mouth daily        atenolol 25 MG tablet    TENORMIN     Take 50 mg by mouth daily        ketorolac 0.5 % ophthalmic solution    ACULAR    1 Bottle    Place 1 drop Into the left eye 4 times daily    Nuclear senile cataract of left eye       ofloxacin 0.3 % ophthalmic solution    OCUFLOX    1 Bottle    Place 1 drop Into the left eye 4 times daily    Nuclear senile cataract of left eye       OMEGA-3 FISH OIL  PO           prednisoLONE acetate 1 % ophthalmic susp    PRED FORTE    1 Bottle    Place 1 drop Into the left eye 4 times daily    Nuclear senile cataract of left eye       rivaroxaban ANTICOAGULANT 20 MG Tabs tablet    XARELTO     Take 20 mg by mouth daily (with dinner)        simvastatin 40 MG tablet    ZOCOR     Take by mouth At Bedtime

## 2018-07-17 ENCOUNTER — OFFICE VISIT (OUTPATIENT)
Dept: OPHTHALMOLOGY | Facility: CLINIC | Age: 83
End: 2018-07-17
Attending: OPHTHALMOLOGY
Payer: MEDICARE

## 2018-07-17 DIAGNOSIS — Z96.1 PSEUDOPHAKIA: Primary | ICD-10-CM

## 2018-07-17 PROCEDURE — G0463 HOSPITAL OUTPT CLINIC VISIT: HCPCS | Mod: ZF

## 2018-07-17 ASSESSMENT — VISUAL ACUITY
OD_CC: 20/30
OD_CC+: +2
OS_SC+: +2
CORRECTION_TYPE: GLASSES
OS_SC: 20/20
METHOD: SNELLEN - LINEAR

## 2018-07-17 ASSESSMENT — REFRACTION_WEARINGRX
OS_AXIS: 140
OS_ADD: +2.50
OD_ADD: +2.50
OS_CYLINDER: +0.50
OD_AXIS: 115
OS_SPHERE: -1.00
OD_CYLINDER: +1.00
OD_SPHERE: -1.50

## 2018-07-17 ASSESSMENT — TONOMETRY
OS_IOP_MMHG: 15
IOP_METHOD: ICARE
OD_IOP_MMHG: 16

## 2018-07-17 ASSESSMENT — EXTERNAL EXAM - RIGHT EYE: OD_EXAM: NORMAL

## 2018-07-17 ASSESSMENT — EXTERNAL EXAM - LEFT EYE: OS_EXAM: NORMAL

## 2018-07-17 ASSESSMENT — SLIT LAMP EXAM - LIDS: COMMENTS: UPPER LID DERMATOCHALASIS

## 2018-07-17 NOTE — MR AVS SNAPSHOT
After Visit Summary   7/17/2018    Gerard Manuel    MRN: [id number]           Patient Information     Date Of Birth          1935        Visit Information        Provider Department      7/17/2018 8:30 AM [facility]        Care Instructions        prednisolone acetate 1% (white / pink cap): begin taper: three times a day for one week, twice a day for one week, daily for one week      Ketorolac 0.4% (grey cap):  three times a day for one week, twice a day for one week, daily for one week      Ofloxacin (tan cap): STOP            Follow-ups after your visit        Your next 10 appointments already scheduled     Jul 23, 2018   Procedure with Gerardo Peña MD   [facility] PeriOP Services (--)    [address]  [facility] [address]   [phone]            Jul 24, 2018 10:00 AM CDT   Post-Op with [facility]   [facility]    [address] [facility] [address] [place] [address]   [phone]            Jul 31, 2018  8:30 AM CDT   Post-Op with Gerardo Peña MD   [facility]    [address] [facility] [address] [place] [address]   [phone]            Aug 21, 2018  8:30 AM CDT   Post-Op with Gerardo Peña MD   [facility]    [address] [facility] [address] [place] [address]   [phone]              Who to contact     Please call your clinic at [phone] to:    Ask questions about your health    Make or cancel appointments    Discuss your medicines    Learn about your test results    Speak to your doctor            Additional Information About Your Visit        Care EveryWhere ID     This is your Care EveryWhere ID. This could be used by other organizations to access your [place] medical records  [id number]         Blood Pressure from Last 3  Encounters:   07/09/18 113/69   10/07/15 113/69    Weight from Last 3 Encounters:   07/02/18 77.6 kg (171 lb)              Today, you had the following     No orders found for display         Today's Medication Changes          These changes are accurate as of 7/17/18  9:15 AM.  If you have any questions, ask your nurse or doctor.               Stop taking these medicines if you haven't already. Please contact your care team if you have questions.     OMEGA-3 FISH OIL PO   Stopped by:  Gerardo Peña MD                    Primary Care Provider Office Phone # Fax #    Shelton Hogue Candelario [phone]       [facility] [address]        Equal Access to Services     JOYA RED : Dave Stephen, anshu messer, haily rodriguez, jazmyne owusu. So [facility] [phone].    ATENCIÓN: Si habla español, tiene a clark disposición servicios gratuitos de asistencia lingüística. Llame al [phone].    We comply with applicable federal civil rights laws and [place] laws. We do not discriminate on the basis of race, color, national origin, age, disability, sex, sexual orientation, or gender identity.            Thank you!     Thank you for choosing [facility]  for your care. Our goal is always to provide you with excellent care. Hearing back from our patients is one way we can continue to improve our services. Please take a few minutes to complete the written survey that you may receive in the mail after your visit with us. Thank you!             Your Updated Medication List - Protect others around you: Learn how to safely use, store and throw away your medicines at www.disposemymeds.org.          This list is accurate as of 7/17/18  9:15 AM.  Always use your most recent med list.                   Brand Name Dispense Instructions for use Diagnosis    aspirin 325 MG tablet      Take 81 mg by mouth daily        atenolol 25 MG  tablet    TENORMIN     Take 50 mg by mouth daily        ketorolac 0.5 % ophthalmic solution    ACULAR    1 Bottle    Place 1 drop Into the left eye 4 times daily    Nuclear senile cataract of left eye       ofloxacin 0.3 % ophthalmic solution    OCUFLOX    1 Bottle    Place 1 drop Into the left eye 4 times daily    Nuclear senile cataract of left eye       prednisoLONE acetate 1 % ophthalmic susp    PRED FORTE    1 Bottle    Place 1 drop Into the left eye 4 times daily    Nuclear senile cataract of left eye       rivaroxaban ANTICOAGULANT 20 MG Tabs tablet    XARELTO     Take 20 mg by mouth daily (with dinner)        simvastatin 40 MG tablet    ZOCOR     Take by mouth At Bedtime

## 2018-07-17 NOTE — NURSING NOTE
Chief Complaints and History of Present Illnesses   Patient presents with     Post Op (Ophthalmology) Left Eye     s/p 1 week cataract extraction with intraocular lens placement left eye     HPI    Affected eye(s):  Left   Symptoms:        Duration:  1 week   Frequency:  Constant       Do you have eye pain now?:  No      Comments:  Pt. States that he is doing well.  VA is much improved LE.  No c/o comfort BE.  Geena Pacheco COT 8:50 AM July 17, 2018

## 2018-07-17 NOTE — PROGRESS NOTES
Assessment / Plan:    Gerard Manuel is a 82 year old male who is 1 week s/p cataract extraction with intraocular lens placement left eye.    Doing well.    Medications in the surgical eye:      prednisolone acetate 1% (white / pink cap): begin taper: three times a day for one week, twice a day for one week, daily for one week       Ketorolac 0.4% (grey cap):  three times a day for one week, twice a day for one week, daily for one week       Ofloxacin (tan cap): STOP    OK to discontinue eye shield  OK to resume normal activity  Avoid swimming pools, hot tubs, saunas for 3 additional weeks    Scheduled for CE/IOL RE on 7/23/17.       Mira Miller MD  PGY-5, Cornea Fellow      ~~~~~~~~~~~~~~~~~~~~~~~~~~~~~~~~~~~~~~~~~~~~~~~~~~~~~~~~~~~~~~~~    Complete documentation of historical and exam elements from today's encounter can be found in the full encounter summary report (not reduplicated in this progress note). I personally obtained the chief complaint(s) and history of present illness.  I confirmed and edited as necessary the review of systems, past medical/surgical history, family history, social history, and examination findings as documented by others.  I examined the patient myself, and I personally reviewed the relevant tests, images, and reports as documented above. I formulated and edited as necessary the assessment and plan and discussed the findings and management plan with the patient and family.     Gerardo Peña MD, MA  Director, Cornea & Anterior Segment  [facility] Department of Ophthalmology & Visual Neuroscience

## 2018-07-17 NOTE — PATIENT INSTRUCTIONS
prednisolone acetate 1% (white / pink cap): begin taper: three times a day for one week, twice a day for one week, daily for one week      Ketorolac 0.4% (grey cap):  three times a day for one week, twice a day for one week, daily for one week      Ofloxacin (tan cap): STOP

## 2018-07-20 DIAGNOSIS — H25.11 AGE-RELATED NUCLEAR CATARACT OF RIGHT EYE: Primary | ICD-10-CM

## 2018-07-20 RX ORDER — KETOROLAC TROMETHAMINE 5 MG/ML
1 SOLUTION OPHTHALMIC 4 TIMES DAILY
Qty: 6 ML | Refills: 0 | Status: SHIPPED | OUTPATIENT
Start: 2018-07-23 | End: 2018-08-22

## 2018-07-20 RX ORDER — OFLOXACIN 3 MG/ML
1 SOLUTION/ DROPS OPHTHALMIC 4 TIMES DAILY
Qty: 2 ML | Refills: 0 | Status: SHIPPED | OUTPATIENT
Start: 2018-07-20 | End: 2018-07-27

## 2018-07-20 RX ORDER — PREDNISOLONE ACETATE 10 MG/ML
1 SUSPENSION/ DROPS OPHTHALMIC 4 TIMES DAILY
Qty: 6 ML | Refills: 0 | Status: SHIPPED | OUTPATIENT
Start: 2018-07-20 | End: 2018-08-19

## 2018-07-23 ENCOUNTER — HOSPITAL ENCOUNTER (OUTPATIENT)
Facility: CLINIC | Age: 83
Discharge: HOME | End: 2018-07-23
Attending: OPHTHALMOLOGY | Admitting: OPHTHALMOLOGY
Payer: MEDICARE

## 2018-07-23 ENCOUNTER — ANESTHESIA EVENT (OUTPATIENT)
Dept: SURGERY | Facility: CLINIC | Age: 83
End: 2018-07-23
Payer: MEDICARE

## 2018-07-23 ENCOUNTER — ANESTHESIA (OUTPATIENT)
Dept: SURGERY | Facility: CLINIC | Age: 83
End: 2018-07-23
Payer: MEDICARE

## 2018-07-23 VITALS
OXYGEN SATURATION: 96 % | DIASTOLIC BLOOD PRESSURE: 77 MMHG | RESPIRATION RATE: 14 BRPM | TEMPERATURE: 97.8 F | SYSTOLIC BLOOD PRESSURE: 130 MMHG

## 2018-07-23 DIAGNOSIS — H25.011 CATARACT CORTICAL, SENILE, RIGHT: Primary | ICD-10-CM

## 2018-07-23 PROCEDURE — 25000125 ZZHC RX 250: Performed by: OPHTHALMOLOGY

## 2018-07-23 PROCEDURE — 25000128 H RX IP 250 OP 636: Performed by: NURSE ANESTHETIST, CERTIFIED REGISTERED

## 2018-07-23 PROCEDURE — 37000008 ZZH ANESTHESIA TECHNICAL FEE, 1ST 30 MIN: Performed by: OPHTHALMOLOGY

## 2018-07-23 PROCEDURE — 71000028 ZZH EYE RECOVERY PHASE 2 EACH 15 MINS: Performed by: OPHTHALMOLOGY

## 2018-07-23 PROCEDURE — 25000125 ZZHC RX 250: Performed by: NURSE ANESTHETIST, CERTIFIED REGISTERED

## 2018-07-23 PROCEDURE — 40000170 ZZH STATISTIC PRE-PROCEDURE ASSESSMENT II: Performed by: OPHTHALMOLOGY

## 2018-07-23 PROCEDURE — 36000101 ZZH EYE SURGERY LEVEL 3 1ST 30 MIN: Performed by: OPHTHALMOLOGY

## 2018-07-23 PROCEDURE — 36000102 ZZH EYE SURGERY LEVEL 3 EA 15 ADDTL MIN: Performed by: OPHTHALMOLOGY

## 2018-07-23 PROCEDURE — 25000128 H RX IP 250 OP 636: Performed by: OPHTHALMOLOGY

## 2018-07-23 PROCEDURE — V2632 POST CHMBR INTRAOCULAR LENS: HCPCS | Performed by: OPHTHALMOLOGY

## 2018-07-23 PROCEDURE — 27210794 ZZH OR GENERAL SUPPLY STERILE: Performed by: OPHTHALMOLOGY

## 2018-07-23 PROCEDURE — 37000009 ZZH ANESTHESIA TECHNICAL FEE, EACH ADDTL 15 MIN: Performed by: OPHTHALMOLOGY

## 2018-07-23 DEVICE — EYE IMP IOL AMO PCL TECNIS ZCB00 18.0: Type: IMPLANTABLE DEVICE | Site: EYE | Status: FUNCTIONAL

## 2018-07-23 RX ORDER — CYCLOPENTOLATE HYDROCHLORIDE 10 MG/ML
1 SOLUTION/ DROPS OPHTHALMIC
Status: DISCONTINUED | OUTPATIENT
Start: 2018-07-23 | End: 2018-07-23 | Stop reason: HOSPADM

## 2018-07-23 RX ORDER — CYCLOPENTOLATE HYDROCHLORIDE 10 MG/ML
1 SOLUTION/ DROPS OPHTHALMIC
Status: COMPLETED | OUTPATIENT
Start: 2018-07-23 | End: 2018-07-23

## 2018-07-23 RX ORDER — PHENYLEPHRINE HYDROCHLORIDE 25 MG/ML
1 SOLUTION/ DROPS OPHTHALMIC
Status: COMPLETED | OUTPATIENT
Start: 2018-07-23 | End: 2018-07-23

## 2018-07-23 RX ORDER — PREDNISOLONE ACETATE 1 %
SUSPENSION, DROPS(FINAL DOSAGE FORM)(ML) OPHTHALMIC (EYE) PRN
Status: DISCONTINUED | OUTPATIENT
Start: 2018-07-23 | End: 2018-07-23 | Stop reason: HOSPADM

## 2018-07-23 RX ORDER — SODIUM CHLORIDE, SODIUM LACTATE, POTASSIUM CHLORIDE, CALCIUM CHLORIDE 600; 310; 30; 20 MG/100ML; MG/100ML; MG/100ML; MG/100ML
INJECTION, SOLUTION INTRAVENOUS CONTINUOUS PRN
Status: DISCONTINUED | OUTPATIENT
Start: 2018-07-23 | End: 2018-07-23

## 2018-07-23 RX ORDER — NALOXONE HYDROCHLORIDE 0.4 MG/ML
.1-.4 INJECTION, SOLUTION INTRAMUSCULAR; INTRAVENOUS; SUBCUTANEOUS
Status: DISCONTINUED | OUTPATIENT
Start: 2018-07-23 | End: 2018-07-23 | Stop reason: HOSPADM

## 2018-07-23 RX ORDER — ONDANSETRON 2 MG/ML
4 INJECTION INTRAMUSCULAR; INTRAVENOUS EVERY 30 MIN PRN
Status: DISCONTINUED | OUTPATIENT
Start: 2018-07-23 | End: 2018-07-23 | Stop reason: HOSPADM

## 2018-07-23 RX ORDER — SODIUM CHLORIDE, SODIUM LACTATE, POTASSIUM CHLORIDE, CALCIUM CHLORIDE 600; 310; 30; 20 MG/100ML; MG/100ML; MG/100ML; MG/100ML
INJECTION, SOLUTION INTRAVENOUS CONTINUOUS
Status: DISCONTINUED | OUTPATIENT
Start: 2018-07-23 | End: 2018-07-23 | Stop reason: HOSPADM

## 2018-07-23 RX ORDER — HYDROMORPHONE HYDROCHLORIDE 1 MG/ML
.3-.5 INJECTION, SOLUTION INTRAMUSCULAR; INTRAVENOUS; SUBCUTANEOUS EVERY 10 MIN PRN
Status: DISCONTINUED | OUTPATIENT
Start: 2018-07-23 | End: 2018-07-23 | Stop reason: HOSPADM

## 2018-07-23 RX ORDER — TROPICAMIDE 10 MG/ML
1 SOLUTION/ DROPS OPHTHALMIC
Status: DISCONTINUED | OUTPATIENT
Start: 2018-07-23 | End: 2018-07-23 | Stop reason: HOSPADM

## 2018-07-23 RX ORDER — FENTANYL CITRATE 50 UG/ML
INJECTION, SOLUTION INTRAMUSCULAR; INTRAVENOUS PRN
Status: DISCONTINUED | OUTPATIENT
Start: 2018-07-23 | End: 2018-07-23

## 2018-07-23 RX ORDER — LIDOCAINE HYDROCHLORIDE 10 MG/ML
INJECTION, SOLUTION EPIDURAL; INFILTRATION; INTRACAUDAL; PERINEURAL PRN
Status: DISCONTINUED | OUTPATIENT
Start: 2018-07-23 | End: 2018-07-23 | Stop reason: HOSPADM

## 2018-07-23 RX ORDER — ONDANSETRON 4 MG/1
4 TABLET, ORALLY DISINTEGRATING ORAL EVERY 30 MIN PRN
Status: DISCONTINUED | OUTPATIENT
Start: 2018-07-23 | End: 2018-07-23 | Stop reason: HOSPADM

## 2018-07-23 RX ORDER — FENTANYL CITRATE 50 UG/ML
25-50 INJECTION, SOLUTION INTRAMUSCULAR; INTRAVENOUS
Status: DISCONTINUED | OUTPATIENT
Start: 2018-07-23 | End: 2018-07-23 | Stop reason: HOSPADM

## 2018-07-23 RX ORDER — BALANCED SALT SOLUTION 6.4; .75; .48; .3; 3.9; 1.7 MG/ML; MG/ML; MG/ML; MG/ML; MG/ML; MG/ML
SOLUTION OPHTHALMIC PRN
Status: DISCONTINUED | OUTPATIENT
Start: 2018-07-23 | End: 2018-07-23 | Stop reason: HOSPADM

## 2018-07-23 RX ORDER — PHENYLEPHRINE HYDROCHLORIDE 25 MG/ML
1 SOLUTION/ DROPS OPHTHALMIC
Status: DISCONTINUED | OUTPATIENT
Start: 2018-07-23 | End: 2018-07-23 | Stop reason: HOSPADM

## 2018-07-23 RX ORDER — PROPARACAINE HYDROCHLORIDE 5 MG/ML
1 SOLUTION/ DROPS OPHTHALMIC ONCE
Status: DISCONTINUED | OUTPATIENT
Start: 2018-07-23 | End: 2018-07-23 | Stop reason: HOSPADM

## 2018-07-23 RX ORDER — PROPARACAINE HYDROCHLORIDE 5 MG/ML
1 SOLUTION/ DROPS OPHTHALMIC ONCE
Status: COMPLETED | OUTPATIENT
Start: 2018-07-23 | End: 2018-07-23

## 2018-07-23 RX ORDER — TROPICAMIDE 10 MG/ML
1 SOLUTION/ DROPS OPHTHALMIC
Status: COMPLETED | OUTPATIENT
Start: 2018-07-23 | End: 2018-07-23

## 2018-07-23 RX ADMIN — PROPARACAINE HYDROCHLORIDE 1 DROP: 5 SOLUTION/ DROPS OPHTHALMIC at 10:24

## 2018-07-23 RX ADMIN — MIDAZOLAM 0.5 MG: 1 INJECTION INTRAMUSCULAR; INTRAVENOUS at 12:21

## 2018-07-23 RX ADMIN — SODIUM CHLORIDE, POTASSIUM CHLORIDE, SODIUM LACTATE AND CALCIUM CHLORIDE: 600; 310; 30; 20 INJECTION, SOLUTION INTRAVENOUS at 12:08

## 2018-07-23 RX ADMIN — TROPICAMIDE 1 DROP: 10 SOLUTION/ DROPS OPHTHALMIC at 10:38

## 2018-07-23 RX ADMIN — CYCLOPENTOLATE HYDROCHLORIDE 1 DROP: 10 SOLUTION/ DROPS OPHTHALMIC at 10:25

## 2018-07-23 RX ADMIN — TROPICAMIDE 1 DROP: 10 SOLUTION/ DROPS OPHTHALMIC at 10:30

## 2018-07-23 RX ADMIN — CYCLOPENTOLATE HYDROCHLORIDE 1 DROP: 10 SOLUTION/ DROPS OPHTHALMIC at 10:38

## 2018-07-23 RX ADMIN — DEXMEDETOMIDINE HYDROCHLORIDE 8 MCG: 100 INJECTION, SOLUTION INTRAVENOUS at 12:16

## 2018-07-23 RX ADMIN — PHENYLEPHRINE HYDROCHLORIDE 1 DROP: 2.5 SOLUTION/ DROPS OPHTHALMIC at 10:38

## 2018-07-23 RX ADMIN — CYCLOPENTOLATE HYDROCHLORIDE 1 DROP: 10 SOLUTION/ DROPS OPHTHALMIC at 10:30

## 2018-07-23 RX ADMIN — DEXMEDETOMIDINE HYDROCHLORIDE 12 MCG: 100 INJECTION, SOLUTION INTRAVENOUS at 12:12

## 2018-07-23 RX ADMIN — TROPICAMIDE 1 DROP: 10 SOLUTION/ DROPS OPHTHALMIC at 10:24

## 2018-07-23 RX ADMIN — PHENYLEPHRINE HYDROCHLORIDE 1 DROP: 2.5 SOLUTION/ DROPS OPHTHALMIC at 10:30

## 2018-07-23 RX ADMIN — FENTANYL CITRATE 50 MCG: 50 INJECTION, SOLUTION INTRAMUSCULAR; INTRAVENOUS at 12:14

## 2018-07-23 RX ADMIN — PHENYLEPHRINE HYDROCHLORIDE 1 DROP: 2.5 SOLUTION/ DROPS OPHTHALMIC at 10:24

## 2018-07-23 RX ADMIN — FENTANYL CITRATE 25 MCG: 50 INJECTION, SOLUTION INTRAMUSCULAR; INTRAVENOUS at 12:43

## 2018-07-23 ASSESSMENT — ENCOUNTER SYMPTOMS: DYSRHYTHMIAS: 1

## 2018-07-23 NOTE — DISCHARGE INSTRUCTIONS
[facility]esthesia [facility] Discharge  Instructions  Anesthesia ([facility] Care Center)   Adult Discharge Instructions    For 24 hours after surgery    1. Get plenty of rest.  Make arrangements to have a responsible adult stay with you for at least 24 hours after you leave the hospital.  2. Do not drive or use heavy equipment for 24 hours.    3. Do not drink alcohol for 24 hours.  4. Do not sign legal documents or make important decisions for 24 hours.  5. Avoid strenuous or risky activities. You may feel lightheaded.  If so, sit for a few minutes before standing.  Have someone help you get up.   6. Conscious sedation patients may resume a regular diet..  7. Any questions of medical nature, call your physician.    [facility]  Post Operative Cataract Instructions    Do not rub the eye, keep the eye shield over the operated eye at night for one week.    Start taking eye drops today. Wait 3-4 minutes between drops.     Ocuflox 4x/day to surgical eye   Ketorolac 4x/day to surgical eye   Prednisolone 4x/day to surgical eye    No heavy lifting, no bending down at the waist, no water in the eye.    Take tylenol as directed on the bottle if you have pain.    Please call [phone] if you develop severe pain, decreased vision, redness, or severe sensitivity to light.    Bring your eye drops to your appointment tomorrow.    You have a follow-up appointment with your doctor tomorrow at the [facility].

## 2018-07-23 NOTE — OP NOTE
OPHTHALMOLOGY OPERATIVE REPORT    PATIENT NAME: Gerard Manuel  DATE: 7/23/2018    SURGEON:  Gerardo Peña MD  ASSISTANT  Moise Mario MD    PREOP DIAGNOSIS: visually significant nuclear sclerosis cataract right eye  POSTOP DIAGNOSIS: same    ANESTHESIA: MAC topical  COMPLICATIONS: none    LENS SPECIFICATIONS: ZCB00 18.0 Diopters    INDICATION FOR PROCEDURE  The patient has a visually-significant cataract that has been affecting their activities of daily living. The risks including, but not limited to infection, loss of vision, loss of eye, need for more surgery, and bleeding, along with the benefits, alternatives, expectations, and the procedure itself were discussed at length with the patient who wished to proceed with surgery.    DESCRIPTION OF PROCEDURE  In the preoperative suite, the patient was identified, the surgical site marked and informed consent was obtained. The patient was then brought back to the operative suite where the appropriate anesthesia monitors were connected. The patient's eye was prepped and draped in the usual sterile fashion for ophthalmic surgery.    A scleral fixation ring and a supersharp blade were used to make a paracentesis incision. Aqueous was replaced with preservative free lidocaine followed by viscoat. A 2.8 mm keratome was used to make a temporal, triplanar clear corneal incision. Capsulorrhexis was completed with a bent cystitome and Utrata forceps. Hydrodissection of the nucleus was achieved with the Ayers cannula. The nucleus was found to move freely within the capsular bag. The cataract was successfully removed.    The irrigation and aspiration handpiece was used to remove the cortex. The capsular bag was filled with Healon. The intraocular lens was injected into the capsular bag. The remaining viscoelastic was removed using irrigation and aspiration. The lens was found to be well-centered and in the capsular bag. BSS on a cannula was used to hydrate the clear  corneal and paracentesis incisions. Weck-razia sponges were used to confirm there were no leaks from the incisions.    A shield was taped over the eye. The patient was then taken to the recovery room in stable condition having tolerated the procedure well and discharged home in good condition.    Dr. Gerardo Peña was scrubbed and present for the entire case.

## 2018-07-23 NOTE — ANESTHESIA PREPROCEDURE EVALUATION
Anesthesia Evaluation     . Pt has had prior anesthetic.     No history of anesthetic complications          ROS/MED HX    ENT/Pulmonary:      (-) sleep apnea   Neurologic:  - neg neurologic ROS     Cardiovascular: Comment: H/o CABG 1992  Afib/Flutter s/p ablation. Now on atenolol and xarelto  Pacemaker    Stress test 10/17- Normal EF. No ischemia    (+) hypertension--CAD, -CABG-date: 1992, . Taking blood thinners : . . . pacemaker :. dysrhythmias a-fib and a-flutter, .       METS/Exercise Tolerance:     Hematologic:         Musculoskeletal:         GI/Hepatic:        (-) GERD   Renal/Genitourinary:  - ROS Renal section negative       Endo:  - neg endo ROS       Psychiatric:  - neg psychiatric ROS       Infectious Disease:  - neg infectious disease ROS       Malignancy:         Other:                     Physical Exam  Normal systems: pulmonary    Airway   Mallampati: I  TM distance: >3 FB  Neck ROM: full    Dental   (+) caps    Cardiovascular   Rhythm and rate: regular      Pulmonary                     Anesthesia Plan      History & Physical Review  History and physical reviewed and following examination; no interval change.    ASA Status:  2 .    NPO Status:  > 8 hours    Plan for MAC Reason for MAC:  Procedure to face, neck, head or breast  PONV prophylaxis:  Ondansetron (or other 5HT-3)       Postoperative Care  Postoperative pain management:  Oral pain medications.      Consents  Anesthetic plan, risks, benefits and alternatives discussed with:  Patient..                          .

## 2018-07-23 NOTE — IP AVS SNAPSHOT
[facility]    [address]    [place] [address]    Phone:  [phone]    Fax:  [phone]                                       After Visit Summary   7/23/2018    Gerard Manuel    MRN: [id number]           After Visit Summary Signature Page     I have received my discharge instructions, and my questions have been answered. I have discussed any challenges I see with this plan with the nurse or doctor.    ..........................................................................................................................................  Patient/Patient Representative Signature      ..........................................................................................................................................  Patient Representative Print Name and Relationship to Patient    ..................................................               ................................................  Date                                            Time    ..........................................................................................................................................  Reviewed by Signature/Title    ...................................................              ..............................................  Date                                                            Time

## 2018-07-23 NOTE — ANESTHESIA CARE TRANSFER NOTE
Patient: Gerard Manuel    Procedure(s):  RIGHT EYE PHACOEMULSIFICATION CLEAR CORNEA WITH STANDARD INTRAOCULAR LENS IMPLANT  - Wound Class: I-Clean    Diagnosis: CATARACT RIGHT EYE   Diagnosis Additional Information: No value filed.    Anesthesia Type:   MAC     Note:  Airway :Room Air  Patient transferred to:Phase II  Comments: Patient alert and appropriate. VSS. Report given to RN.Handoff Report: Identifed the Patient, Identified the Reponsible Provider, Reviewed the pertinent medical history, Discussed the surgical course, Reviewed Intra-OP anesthesia mangement and issues during anesthesia, Set expectations for post-procedure period and Allowed opportunity for questions and acknowledgement of understanding      Vitals: (Last set prior to Anesthesia Care Transfer)    CRNA VITALS  7/23/2018 1218 - 7/23/2018 1251      7/23/2018             Ht Rate: 59    SpO2: (!)  79 %    Resp Rate (set): 10                Electronically Signed By: RENÉE Cox CRNA  July 23, 2018  12:51 PM

## 2018-07-23 NOTE — ANESTHESIA POSTPROCEDURE EVALUATION
Patient: Gerard Manuel    Procedure(s):  RIGHT EYE PHACOEMULSIFICATION CLEAR CORNEA WITH STANDARD INTRAOCULAR LENS IMPLANT  - Wound Class: I-Clean    Diagnosis:CATARACT RIGHT EYE   Diagnosis Additional Information: No value filed.    Anesthesia Type:  MAC    Note:  Anesthesia Post Evaluation    Patient location during evaluation: PACU  Patient participation: Able to fully participate in evaluation  Level of consciousness: awake and alert  Pain management: adequate  Airway patency: patent  Cardiovascular status: acceptable  Respiratory status: acceptable and unassisted  Hydration status: acceptable  PONV: none             Last vitals:  Vitals:    07/23/18 1033 07/23/18 1248 07/23/18 1301   BP: 126/81 113/76 130/77   Resp: 18 14 14   Temp: 36.6  C (97.8  F)     SpO2: 99% 96% 96%         Electronically Signed By: Eris Viramontes MD  July 23, 2018  1:49 PM

## 2018-07-23 NOTE — IP AVS SNAPSHOT
MRN:[id number]                      After Visit Summary   7/23/2018    Gerard Manuel    MRN: [id number]           Thank you!     Thank you for choosing [facility] for your care. Our goal is always to provide you with excellent care. Hearing back from our patients is one way we can continue to improve our services. Please take a few minutes to complete the written survey that you may receive in the mail after you visit with us. Thank you!        Patient Information     Date Of Birth          1935        Designated Caregiver       Most Recent Value    Caregiver    Name of designated caregiver Sherine - Spouse    Phone number of caregiver [phone] - cell    Caregiver address [place]      About your hospital stay     You were admitted on:  July 23, 2018 You last received care in the:  [facility]    You were discharged on:  July 23, 2018       Who to Call     For medical emergencies, please call 911.  For non-urgent questions about your medical care, please call your primary care provider or clinic, [phone]  For questions related to your surgery, please call your surgery clinic        Attending Provider     Provider Specialty    Gerardo Peña MD Ophthalmology       Primary Care Provider Office Phone # Fax #    Shelton Palomino [phone]      Your next 10 appointments already scheduled     Jul 24, 2018 10:00 AM CDT   Post-Op with Gerardo Peña MD   Eye Clinic ([facility])    [place] [address] [address]   [phone]            Jul 31, 2018  8:30 AM CDT   Post-Op with Gerardo Peña MD   Eye Clinic ([facility])    [place] [address]   [phone]            Aug 21, 2018  8:30 AM CDT   Post-Op with Gerardo Peña MD   Eye Clinic ([facility])    [place]  [address]  9[facility]  [facility] [address]   [phone]              Further instructions from your care team       [facility]est[facility] Discharge  Instructions  Anesthesia (Eye Care Center)   Adult Discharge Instructions    For 24 hours after surgery    1. Get plenty of rest.  Make arrangements to have a responsible adult stay with you for at least 24 hours after you leave the hospital.  2. Do not drive or use heavy equipment for 24 hours.    3. Do not drink alcohol for 24 hours.  4. Do not sign legal documents or make important decisions for 24 hours.  5. Avoid strenuous or risky activities. You may feel lightheaded.  If so, sit for a few minutes before standing.  Have someone help you get up.   6. Conscious sedation patients may resume a regular diet..  7. Any questions of medical nature, call your physician.    [facility]  Post Operative Cataract Instructions    Do not rub the eye, keep the eye shield over the operated eye at night for one week.    Start taking eye drops today. Wait 3-4 minutes between drops.     Ocuflox 4x/day to surgical eye   Ketorolac 4x/day to surgical eye   Prednisolone 4x/day to surgical eye    No heavy lifting, no bending down at the waist, no water in the eye.    Take tylenol as directed on the bottle if you have pain.    Please call [phone] if you develop severe pain, decreased vision, redness, or severe sensitivity to light.    Bring your eye drops to your appointment tomorrow.    You have a follow-up appointment with your doctor tomorrow at the [facility] Eye clinic.         Pending Results     No orders found from 7/21/2018 to 7/24/2018.            Admission Information     Date & Time Provider Department Dept. Phone    7/23/2018 Page, Gerardo Browning MD [facility] [phone]      Your Vitals Were     Blood Pressure Temperature Respirations Pulse Oximetry          113/76 97.8   F (36.6  C) (Temporal) 14 96%        Care EveryWhere ID     This is your Care EveryWhere ID. This could be used by other organizations to access your [facility] medical records  [id number]        Equal Access to Services     JOYA RED : Hadii aad ku hadcheyo Soomaali, waaxda luqadaha, qaybta kaalmada adeegyada, jazmyne mengmauro owusu. So [facility] [phone].    ATENCIÓN: Si habla español, tiene a clark disposición servicios gratuitos de asistencia lingüística. Llame al [phone].    We comply with applicable federal civil rights laws and [place] laws. We do not discriminate on the basis of race, color, national origin, age, disability, sex, sexual orientation, or gender identity.               Review of your medicines      UNREVIEWED medicines. Ask your doctor about these medicines        Dose / Directions    aspirin 325 MG tablet        Dose:  81 mg   Take 81 mg by mouth daily   Refills:  0       atenolol 25 MG tablet   Commonly known as:  TENORMIN        Dose:  50 mg   Take 50 mg by mouth daily   Refills:  0       * ketorolac 0.5 % ophthalmic solution   Commonly known as:  ACULAR   Used for:  Nuclear senile cataract of left eye        Dose:  1 drop   Place 1 drop Into the left eye 4 times daily   Quantity:  1 Bottle   Refills:  0       * ketorolac 0.5 % ophthalmic solution   Commonly known as:  ACULAR   Used for:  Age-related nuclear cataract of right eye        Dose:  1 drop   Place 1 drop into the right eye 4 times daily   Quantity:  6 mL   Refills:  0       * ofloxacin 0.3 % ophthalmic solution   Commonly known as:  OCUFLOX   Used for:  Nuclear senile cataract of left eye        Dose:  1 drop   Place 1 drop Into the left eye 4 times daily   Quantity:  1 Bottle   Refills:  0       * ofloxacin 0.3 % ophthalmic solution   Commonly known as:  OCUFLOX   Used for:  Age-related nuclear cataract of right eye        Dose:  1 drop   Place 1 drop into the right eye 4 times daily for 7 days    Quantity:  2 mL   Refills:  0       * prednisoLONE acetate 1 % ophthalmic susp   Commonly known as:  PRED FORTE   Used for:  Nuclear senile cataract of left eye        Dose:  1 drop   Place 1 drop Into the left eye 4 times daily   Quantity:  1 Bottle   Refills:  0       * prednisoLONE acetate 1 % ophthalmic susp   Commonly known as:  PRED FORTE   Used for:  Age-related nuclear cataract of right eye        Dose:  1 drop   Place 1 drop into the right eye 4 times daily   Quantity:  6 mL   Refills:  0       rivaroxaban ANTICOAGULANT 20 MG Tabs tablet   Commonly known as:  XARELTO        Dose:  20 mg   Take 20 mg by mouth daily (with dinner)   Refills:  0       simvastatin 40 MG tablet   Commonly known as:  ZOCOR        Take by mouth At Bedtime   Refills:  0       * Notice:  This list has 6 medication(s) that are the same as other medications prescribed for you. Read the directions carefully, and ask your doctor or other care provider to review them with you.             Protect others around you: Learn how to safely use, store and throw away your medicines at www.disposemymeds.org.             Medication List: This is a list of all your medications and when to take them. Check marks below indicate your daily home schedule. Keep this list as a reference.      Medications           Morning Afternoon Evening Bedtime As Needed    aspirin 325 MG tablet   Take 81 mg by mouth daily                                atenolol 25 MG tablet   Commonly known as:  TENORMIN   Take 50 mg by mouth daily                                * ketorolac 0.5 % ophthalmic solution   Commonly known as:  ACULAR   Place 1 drop Into the left eye 4 times daily                                * ketorolac 0.5 % ophthalmic solution   Commonly known as:  ACULAR   Place 1 drop into the right eye 4 times daily                                * ofloxacin 0.3 % ophthalmic solution   Commonly known as:  OCUFLOX   Place 1 drop Into the left eye 4 times daily    Last time this was given:  1 drop on 7/23/2018 12:38 PM                                * ofloxacin 0.3 % ophthalmic solution   Commonly known as:  OCUFLOX   Place 1 drop into the right eye 4 times daily for 7 days   Last time this was given:  1 drop on 7/23/2018 12:38 PM                                * prednisoLONE acetate 1 % ophthalmic susp   Commonly known as:  PRED FORTE   Place 1 drop Into the left eye 4 times daily   Last time this was given:  1 drop on 7/23/2018 12:38 PM                                * prednisoLONE acetate 1 % ophthalmic susp   Commonly known as:  PRED FORTE   Place 1 drop into the right eye 4 times daily   Last time this was given:  1 drop on 7/23/2018 12:38 PM                                rivaroxaban ANTICOAGULANT 20 MG Tabs tablet   Commonly known as:  XARELTO   Take 20 mg by mouth daily (with dinner)                                simvastatin 40 MG tablet   Commonly known as:  ZOCOR   Take by mouth At Bedtime                                * Notice:  This list has 6 medication(s) that are the same as other medications prescribed for you. Read the directions carefully, and ask your doctor or other care provider to review them with you.

## 2018-07-24 ENCOUNTER — OFFICE VISIT (OUTPATIENT)
Dept: OPHTHALMOLOGY | Facility: CLINIC | Age: 83
End: 2018-07-24
Attending: OPHTHALMOLOGY
Payer: MEDICARE

## 2018-07-24 DIAGNOSIS — Z98.890 POSTOPERATIVE EYE STATE: Primary | ICD-10-CM

## 2018-07-24 DIAGNOSIS — Z96.1 PSEUDOPHAKIA: ICD-10-CM

## 2018-07-24 PROCEDURE — G0463 HOSPITAL OUTPT CLINIC VISIT: HCPCS | Mod: ZF

## 2018-07-24 ASSESSMENT — SLIT LAMP EXAM - LIDS: COMMENTS: UPPER LID DERMATOCHALASIS

## 2018-07-24 ASSESSMENT — VISUAL ACUITY
OS_SC: 20/20
METHOD: SNELLEN - LINEAR
OS_SC+: -1
OD_PH_SC: 20/25
OD_SC: 20/30

## 2018-07-24 ASSESSMENT — REFRACTION_WEARINGRX
OS_AXIS: 140
OD_ADD: +2.50
OD_CYLINDER: +1.00
OS_SPHERE: -1.00
OS_ADD: +2.50
OD_AXIS: 115
OD_SPHERE: -1.50
OS_CYLINDER: +0.50

## 2018-07-24 ASSESSMENT — CONF VISUAL FIELD
OD_NORMAL: 1
OS_NORMAL: 1
METHOD: COUNTING FINGERS

## 2018-07-24 ASSESSMENT — TONOMETRY
OS_IOP_MMHG: 09
OD_IOP_MMHG: 12
IOP_METHOD: ICARE

## 2018-07-24 ASSESSMENT — EXTERNAL EXAM - RIGHT EYE: OD_EXAM: NORMAL

## 2018-07-24 ASSESSMENT — EXTERNAL EXAM - LEFT EYE: OS_EXAM: NORMAL

## 2018-07-24 NOTE — PROGRESS NOTES
Assessment / Plan:    Gerard Manuel is a 82 year old male who is 1 day s/p cataract extraction with intraocular lens placement right eye.    Off to a good start. Started drops last night, no pain     Medications in the surgical eye:    prednisolone acetate 1% four times a day     Ketorolac 0.4% four times a day     Ofloxacin four times a day      Limit heavy lifting, bending over, and heavy exertion. Light aerobic activity is acceptable. Avoid swimming pools, hot tubs, or saunas for 3-4 weeks.  Wear the protective shield at night for the next seven days, and call for increased redness, discharge, pain, or decreased vision.    Return to clinic in approximately 1 week, earlier as needed.    Moise Mario MD  Ophthalmology Resident, PGY-3  [facility]      ~~~~~~~~~~~~~~~~~~~~~~~~~~~~~~~~~~~~~~~~~~~~~~~~~~~~~~~~~~~~~~~~    Complete documentation of historical and exam elements from today's encounter can be found in the full encounter summary report (not reduplicated in this progress note). I personally obtained the chief complaint(s) and history of present illness.  I confirmed and edited as necessary the review of systems, past medical/surgical history, family history, social history, and examination findings as documented by others.  I examined the patient myself, and I personally reviewed the relevant tests, images, and reports as documented above. I formulated and edited as necessary the assessment and plan and discussed the findings and management plan with the patient and family.     Gerardo Peña MD, MA  Director, Cornea & Anterior Segment  [facility] Department of Ophthalmology & Visual Neuroscience

## 2018-07-24 NOTE — MR AVS SNAPSHOT
After Visit Summary   7/24/2018    Gerard Manuel    MRN: [id number]           Patient Information     Date Of Birth          1935        Visit Information        Provider Department      7/24/2018 10:00 AM Gerardo Peña MD Eye Clinic        Today's Diagnoses     Postoperative eye state    -  1    Pseudophakia - Both Eyes           Follow-ups after your visit        Follow-up notes from your care team     Return for 1 week as scheduled.      Your next 10 appointments already scheduled     Jul 31, 2018  8:30 AM CDT   Post-Op with Gerardo Peña MD   Eye Clinic ([facility])    [address] [address]   [phone]            Aug 21, 2018  8:30 AM CDT   Post-Op with Gerardo Peña MD   Eye Clinic ([facility])    [address] [address]   [phone]              Who to contact     Please call your clinic at [phone] to:    Ask questions about your health    Make or cancel appointments    Discuss your medicines    Learn about your test results    Speak to your doctor            Additional Information About Your Visit        Care EveryWhere ID     This is your Care EveryWhere ID. This could be used by other organizations to access your [facility] medical records  [id number]         Blood Pressure from Last 3 Encounters:   07/23/18 130/77   07/09/18 113/69   10/07/15 113/69    Weight from Last 3 Encounters:   07/02/18 77.6 kg (171 lb)              Today, you had the following     No orders found for display       Primary Care Provider Office Phone # Fax #    Shelton Palomino [phone]       [facility] [address] [place] [address]        Equal Access to Services     JOYA RED AH: anshu Matute, jazmyne glynn. So  [facility] [phone].    ATENCIÓN: Si raf mcpherson, tiene a clark disposición servicios gratuitos de asistencia lingüística. Elenita ludwig [phone].    We comply with applicable federal civil rights laws and [place] laws. We do not discriminate on the basis of race, color, national origin, age, disability, sex, sexual orientation, or gender identity.            Thank you!     Thank you for choosing EYE CLINIC  for your care. Our goal is always to provide you with excellent care. Hearing back from our patients is one way we can continue to improve our services. Please take a few minutes to complete the written survey that you may receive in the mail after your visit with us. Thank you!             Your Updated Medication List - Protect others around you: Learn how to safely use, store and throw away your medicines at www.disposemymeds.org.          This list is accurate as of 7/24/18 11:39 AM.  Always use your most recent med list.                   Brand Name Dispense Instructions for use Diagnosis    aspirin 325 MG tablet      Take 81 mg by mouth daily        atenolol 25 MG tablet    TENORMIN     Take 50 mg by mouth daily        * ketorolac 0.5 % ophthalmic solution    ACULAR    1 Bottle    Place 1 drop Into the left eye 4 times daily    Nuclear senile cataract of left eye       * ketorolac 0.5 % ophthalmic solution    ACULAR    6 mL    Place 1 drop into the right eye 4 times daily    Age-related nuclear cataract of right eye       * ofloxacin 0.3 % ophthalmic solution    OCUFLOX    1 Bottle    Place 1 drop Into the left eye 4 times daily    Nuclear senile cataract of left eye       * ofloxacin 0.3 % ophthalmic solution    OCUFLOX    2 mL    Place 1 drop into the right eye 4 times daily for 7 days    Age-related nuclear cataract of right eye       * prednisoLONE acetate 1 % ophthalmic susp    PRED FORTE    1 Bottle    Place 1 drop Into the left eye 4 times daily    Nuclear senile cataract of left eye       *  prednisoLONE acetate 1 % ophthalmic susp    PRED FORTE    6 mL    Place 1 drop into the right eye 4 times daily    Age-related nuclear cataract of right eye       rivaroxaban ANTICOAGULANT 20 MG Tabs tablet    XARELTO     Take 20 mg by mouth daily (with dinner)        simvastatin 40 MG tablet    ZOCOR     Take by mouth At Bedtime        * Notice:  This list has 6 medication(s) that are the same as other medications prescribed for you. Read the directions carefully, and ask your doctor or other care provider to review them with you.

## 2018-07-24 NOTE — NURSING NOTE
Chief Complaints and History of Present Illnesses   Patient presents with     Post Op (Ophthalmology) Right Eye     POD #1 s/p CE/IOL right eye 07/23/2018     HPI    Affected eye(s):  Right   Symptoms:     No floaters   No flashes   No redness   No Dryness   No itching         Do you have eye pain now?:  No      Comments:  Pt slept well last night. Slight irritation last night, but none today. Pt also notes stinging when using post op drops.    Renuka DAVIS July 24, 2018 10:40 AM                no

## 2018-07-31 ENCOUNTER — OFFICE VISIT (OUTPATIENT)
Dept: OPHTHALMOLOGY | Facility: CLINIC | Age: 83
End: 2018-07-31
Attending: OPHTHALMOLOGY
Payer: MEDICARE

## 2018-07-31 DIAGNOSIS — Z96.1 PSEUDOPHAKIA: Primary | ICD-10-CM

## 2018-07-31 PROCEDURE — G0463 HOSPITAL OUTPT CLINIC VISIT: HCPCS | Mod: ZF

## 2018-07-31 ASSESSMENT — EXTERNAL EXAM - LEFT EYE: OS_EXAM: NORMAL

## 2018-07-31 ASSESSMENT — EXTERNAL EXAM - RIGHT EYE: OD_EXAM: NORMAL

## 2018-07-31 ASSESSMENT — CUP TO DISC RATIO
OS_RATIO: 0.3
OD_RATIO: 0.25

## 2018-07-31 ASSESSMENT — VISUAL ACUITY
OS_SC: 20/20
OD_SC+: -1
METHOD: SNELLEN - LINEAR
OD_SC: 20/20

## 2018-07-31 ASSESSMENT — CONF VISUAL FIELD
METHOD: COUNTING FINGERS
OD_NORMAL: 1
OS_NORMAL: 1

## 2018-07-31 ASSESSMENT — SLIT LAMP EXAM - LIDS: COMMENTS: UPPER LID DERMATOCHALASIS

## 2018-07-31 ASSESSMENT — TONOMETRY
OD_IOP_MMHG: 15
OS_IOP_MMHG: 18
IOP_METHOD: TONOPEN

## 2018-07-31 NOTE — NURSING NOTE
Chief Complaints and History of Present Illnesses   Patient presents with     Post Op (Ophthalmology) Right Eye     ceiol     HPI    Affected eye(s):  Right   Symptoms:        Frequency:  Constant       Do you have eye pain now?:  No      Comments:  States that the va is good  No red watery or dry  Ambreen Escobar COT 8:48 AM July 31, 2018

## 2018-07-31 NOTE — MR AVS SNAPSHOT
After Visit Summary   7/31/2018    Gerard Manuel    MRN: [id number]           Patient Information     Date Of Birth          1935        Visit Information        Provider Department      7/31/2018 8:30 AM Gerardo Peña MD Eye Clinic        Care Instructions    Instructions for RIGHT EYE:     prednisolone acetate 1% (white / pink cap): begin taper: three times a day for one week, twice a day for one week, daily for one week      Ketorolac 0.4% (grey cap):  three times a day for one week, twice a day for one week, daily for one week      Ofloxacin (tan cap): STOP    Instructions for LEFT EYE:     prednisolone acetate 1% (white / pink cap): daily for one week then STOP     Ketorolac 0.4% (grey cap):  daily for one week  Then STOP            Follow-ups after your visit        Follow-up notes from your care team     Return in about 3 weeks (around 8/21/2018) for Follow Up with refraction .      Your next 10 appointments already scheduled     Aug 21, 2018  8:30 AM CDT   Post-Op with Gerardo Peña MD   Eye Clinic ([facility])    [address] [id number] [phone]              Who to contact     Please call your clinic at [phone] to:    Ask questions about your health    Make or cancel appointments    Discuss your medicines    Learn about your test results    Speak to your doctor            Additional Information About Your Visit        Care EveryWhere ID     This is your Care EveryWhere ID. This could be used by other organizations to access your [place] medical records  [id number]         Blood Pressure from Last 3 Encounters:   07/23/18 130/77   07/09/18 113/69   10/07/15 113/69    Weight from Last 3 Encounters:   07/02/18 77.6 kg (171 lb)              Today, you had the following     No orders found for display       Primary Care Provider Office Phone # Fax #    Shelton Palomino [phone]  [phone]       [facility] [address]   [facility] [address]        Equal Access to Services     JOYA RED : Hadii aad ku hadcheysandi Somark, waaxda luqadaha, qaybta kaalmada alexalissdrew, waxay idiin haysonidobrunilda wattelviabernarda owusu. So [facility] [phone].    ATENCIÓN: Si habla español, tiene a clark disposición servicios gratuitos de asistencia lingüística. Llame al [phone].    We comply with applicable federal civil rights laws and [place] laws. We do not discriminate on the basis of race, color, national origin, age, disability, sex, sexual orientation, or gender identity.            Thank you!     Thank you for choosing EYE CLINIC  for your care. Our goal is always to provide you with excellent care. Hearing back from our patients is one way we can continue to improve our services. Please take a few minutes to complete the written survey that you may receive in the mail after your visit with us. Thank you!             Your Updated Medication List - Protect others around you: Learn how to safely use, store and throw away your medicines at www.disposemymeds.org.          This list is accurate as of 7/31/18  9:09 AM.  Always use your most recent med list.                   Brand Name Dispense Instructions for use Diagnosis    aspirin 325 MG tablet      Take 81 mg by mouth daily        atenolol 25 MG tablet    TENORMIN     Take 50 mg by mouth daily        * ketorolac 0.5 % ophthalmic solution    ACULAR    1 Bottle    Place 1 drop Into the left eye 4 times daily    Nuclear senile cataract of left eye       * ketorolac 0.5 % ophthalmic solution    ACULAR    6 mL    Place 1 drop into the right eye 4 times daily    Age-related nuclear cataract of right eye       ofloxacin 0.3 % ophthalmic solution    OCUFLOX    1 Bottle    Place 1 drop Into the left eye 4 times daily    Nuclear senile cataract of left eye       * prednisoLONE acetate 1 % ophthalmic susp    PRED FORTE    1 Bottle    Place 1 drop Into the  left eye 4 times daily    Nuclear senile cataract of left eye       * prednisoLONE acetate 1 % ophthalmic susp    PRED FORTE    6 mL    Place 1 drop into the right eye 4 times daily    Age-related nuclear cataract of right eye       rivaroxaban ANTICOAGULANT 20 MG Tabs tablet    XARELTO     Take 20 mg by mouth daily (with dinner)        simvastatin 40 MG tablet    ZOCOR     Take by mouth At Bedtime        * Notice:  This list has 4 medication(s) that are the same as other medications prescribed for you. Read the directions carefully, and ask your doctor or other care provider to review them with you.

## 2018-07-31 NOTE — PATIENT INSTRUCTIONS
Instructions for RIGHT EYE:     prednisolone acetate 1% (white / pink cap): begin taper: three times a day for one week, twice a day for one week, daily for one week      Ketorolac 0.4% (grey cap):  three times a day for one week, twice a day for one week, daily for one week      Ofloxacin (tan cap): STOP    Instructions for LEFT EYE:     prednisolone acetate 1% (white / pink cap): daily for one week then STOP     Ketorolac 0.4% (grey cap):  daily for one week  Then STOP

## 2018-07-31 NOTE — PROGRESS NOTES
Assessment / Plan:    Gerard Manuel is a 82 year old male who is 1 week s/p cataract extraction with intraocular lens placement right eye. Left eye Cataract extraction/iol placement preformed first.     Now using predforte, ofloxacin, and ketoroac four times a day right eye. He continues on predforte qday and ketorolac qday left eye. No pain, eyes are comfortable. Patient is happy with his vision.     Doing well.    Medications in the surgical eye:      prednisolone acetate 1% (white / pink cap): begin taper: three times a day for one week, twice a day for one week, daily for one week       Ketorolac 0.4% (grey cap):  three times a day for one week, twice a day for one week, daily for one week       Ofloxacin (tan cap): STOP    OK to discontinue eye shield  OK to resume normal activity  Avoid swimming pools, hot tubs, saunas for 3 additional weeks    Return to clinic in approximately 3-4 weeks, earlier as needed.  Plan dilated fundus exam and manifest refraction in the operative eye at next visit.        Jessie Slaughter MD  Ophthalmology PGY4            ~~~~~~~~~~~~~~~~~~~~~~~~~~~~~~~~~~~~~~~~~~~~~~~~~~~~~~~~~~~~~~~~    Complete documentation of historical and exam elements from today's encounter can be found in the full encounter summary report (not reduplicated in this progress note). I personally obtained the chief complaint(s) and history of present illness.  I confirmed and edited as necessary the review of systems, past medical/surgical history, family history, social history, and examination findings as documented by others.  I examined the patient myself, and I personally reviewed the relevant tests, images, and reports as documented above. I formulated and edited as necessary the assessment and plan and discussed the findings and management plan with the patient and family.     Gerardo Peña MD, MA  Director, Cornea & Anterior Segment  [facility] Department of Ophthalmology & Visual  Neuroscience

## 2018-08-21 ENCOUNTER — OFFICE VISIT (OUTPATIENT)
Dept: OPHTHALMOLOGY | Facility: CLINIC | Age: 83
End: 2018-08-21
Attending: OPHTHALMOLOGY
Payer: MEDICARE

## 2018-08-21 DIAGNOSIS — Z96.1 PSEUDOPHAKIA: Primary | ICD-10-CM

## 2018-08-21 PROCEDURE — G0463 HOSPITAL OUTPT CLINIC VISIT: HCPCS | Mod: ZF

## 2018-08-21 PROCEDURE — 92015 DETERMINE REFRACTIVE STATE: CPT | Mod: GY,ZF

## 2018-08-21 ASSESSMENT — REFRACTION_MANIFEST
OS_SPHERE: -0.25
OD_ADD: +2.75
OS_CYLINDER: +0.50
OS_ADD: +2.75
OD_CYLINDER: +0.75
OS_AXIS: 075
OD_AXIS: 125
OD_SPHERE: -0.75

## 2018-08-21 ASSESSMENT — VISUAL ACUITY
OD_SC: 20/20 SLOW
METHOD: SNELLEN - LINEAR
OS_SC: 20/20

## 2018-08-21 ASSESSMENT — EXTERNAL EXAM - RIGHT EYE: OD_EXAM: NORMAL

## 2018-08-21 ASSESSMENT — CUP TO DISC RATIO
OD_RATIO: 0.25
OS_RATIO: 0.3

## 2018-08-21 ASSESSMENT — SLIT LAMP EXAM - LIDS: COMMENTS: UPPER LID DERMATOCHALASIS

## 2018-08-21 ASSESSMENT — TONOMETRY
IOP_METHOD: TONOPEN
OS_IOP_MMHG: 12
OD_IOP_MMHG: 11

## 2018-08-21 ASSESSMENT — EXTERNAL EXAM - LEFT EYE: OS_EXAM: NORMAL

## 2018-08-21 NOTE — NURSING NOTE
Chief Complaints and History of Present Illnesses   Patient presents with     Post Op (Ophthalmology) Right Eye     one month s/p PCIOL (7/23/18)     HPI    Affected eye(s):  Right   Symptoms:     No decreased vision   No floaters   No flashes      Duration:  3 weeks   Frequency:  Constant       Do you have eye pain now?:  No      Comments:  Pt one month s/p PCIOL right eye  Reports vision is good at distance  Pt states he occasionally notes some flickering on the temporal side each eye but not much lately    Pt finished POP drops yesterday  Uses occasional AT's PRN    Eva Diaz COA 8:48 AM August 21, 2018

## 2018-08-21 NOTE — MR AVS SNAPSHOT
After Visit Summary   8/21/2018    Gerard Manuel    MRN: [id number]           Patient Information     Date Of Birth          1935        Visit Information        Provider Department      8/21/2018 8:30 AM [facility]        Today's Diagnoses     Pseudophakia - Both Eyes    -  1       Follow-ups after your visit        Follow-up notes from your care team     Return in about 6 months (around 2/21/2019).      Who to contact     Please call your clinic at [phone] to:    Ask questions about your health    Make or cancel appointments    Discuss your medicines    Learn about your test results    Speak to your doctor            Additional Information About Your Visit        Care EveryWhere ID     This is your Care EveryWhere ID. This could be used by other organizations to access your [place] medical records  [id number]         Blood Pressure from Last 3 Encounters:   07/23/18 130/77   07/09/18 113/69   10/07/15 113/69    Weight from Last 3 Encounters:   07/02/18 77.6 kg (171 lb)              Today, you had the following     No orders found for display       Primary Care Provider Office Phone # Fax #    Shelton Palomino [phone]       [facility] [address]        Equal Access to Services     JOYA RED : Hadii aad ku hadasho Soomaali, waaxda luqadaha, qaybta kaalmada adeegyada, jazmyne owusu. So [facility] [phone].    ATENCIÓN: Si habla español, tiene a clark disposición servicios gratuitos de asistencia lingüística. Elenita al [phone].    We comply with applicable federal civil rights laws and [place] laws. We do not discriminate on the basis of race, color, national origin, age, disability, sex, sexual orientation, or gender identity.            Thank you!     Thank you for choosing [facility]  for your care. Our goal is always to provide you with excellent care. Hearing back from our  patients is one way we can continue to improve our services. Please take a few minutes to complete the written survey that you may receive in the mail after your visit with us. Thank you!             Your Updated Medication List - Protect others around you: Learn how to safely use, store and throw away your medicines at www.disposemymeds.org.          This list is accurate as of 8/21/18 10:29 AM.  Always use your most recent med list.                   Brand Name Dispense Instructions for use Diagnosis    aspirin 325 MG tablet      Take 81 mg by mouth daily        atenolol 25 MG tablet    TENORMIN     Take 50 mg by mouth daily        * ketorolac 0.5 % ophthalmic solution    ACULAR    1 Bottle    Place 1 drop Into the left eye 4 times daily    Nuclear senile cataract of left eye       * ketorolac 0.5 % ophthalmic solution    ACULAR    6 mL    Place 1 drop into the right eye 4 times daily    Age-related nuclear cataract of right eye       ofloxacin 0.3 % ophthalmic solution    OCUFLOX    1 Bottle    Place 1 drop Into the left eye 4 times daily    Nuclear senile cataract of left eye       prednisoLONE acetate 1 % ophthalmic susp    PRED FORTE    1 Bottle    Place 1 drop Into the left eye 4 times daily    Nuclear senile cataract of left eye       rivaroxaban ANTICOAGULANT 20 MG Tabs tablet    XARELTO     Take 20 mg by mouth daily (with dinner)        simvastatin 40 MG tablet    ZOCOR     Take by mouth At Bedtime        * Notice:  This list has 2 medication(s) that are the same as other medications prescribed for you. Read the directions carefully, and ask your doctor or other care provider to review them with you.

## 2018-08-21 NOTE — PROGRESS NOTES
A/P:    Gerard Manuel is a 82 year old male presenting today for 1 month follow up after cataract extraction with intraocular lens implantation of the both eyes.     - Doing well  - Off all drops  - Retinal detachment precautions given    return to clinic 6 months for reeval      ~~~~~~~~~~~~~~~~~~~~~~~~~~~~~~~~~~~~~~~~~~~~~~~~~~~~~~~~~~~~~~~~    Complete documentation of historical and exam elements from today's encounter can be found in the full encounter summary report (not reduplicated in this progress note). I personally obtained the chief complaint(s) and history of present illness.  I confirmed and edited as necessary the review of systems, past medical/surgical history, family history, social history, and examination findings as documented by others.  I examined the patient myself, and I personally reviewed the relevant tests, images, and reports as documented above. I formulated and edited as necessary the assessment and plan and discussed the findings and management plan with the patient and family.     Gerardo Peña MD, MA  Director, Cornea & Anterior Segment  [facility] Department of Ophthalmology & Visual Neuroscience

## 2018-10-25 ENCOUNTER — OFFICE VISIT (OUTPATIENT)
Dept: DERMATOLOGY | Facility: CLINIC | Age: 83
End: 2018-10-25
Payer: MEDICARE

## 2018-10-25 DIAGNOSIS — B35.3 TINEA PEDIS OF BOTH FEET: ICD-10-CM

## 2018-10-25 DIAGNOSIS — L82.0 SEBORRHEIC KERATOSES, INFLAMED: ICD-10-CM

## 2018-10-25 DIAGNOSIS — D48.5 NEOPLASM OF UNCERTAIN BEHAVIOR OF SKIN: Primary | ICD-10-CM

## 2018-10-25 PROCEDURE — 88305 TISSUE EXAM BY PATHOLOGIST: CPT | Mod: TC | Performed by: DERMATOLOGY

## 2018-10-25 RX ORDER — ECONAZOLE NITRATE 10 MG/G
CREAM TOPICAL
Qty: 85 G | Refills: 11 | Status: SHIPPED | OUTPATIENT
Start: 2018-10-25 | End: 2019-03-11

## 2018-10-25 RX ORDER — LIDOCAINE HYDROCHLORIDE AND EPINEPHRINE 10; 10 MG/ML; UG/ML
3 INJECTION, SOLUTION INFILTRATION; PERINEURAL ONCE
Qty: 3 ML | Refills: 0 | OUTPATIENT
Start: 2018-10-25 | End: 2019-03-11

## 2018-10-25 RX ORDER — LIDOCAINE HYDROCHLORIDE AND EPINEPHRINE 10; 10 MG/ML; UG/ML
3 INJECTION, SOLUTION INFILTRATION; PERINEURAL ONCE
Qty: 3 ML | Refills: 0 | Status: CANCELLED | OUTPATIENT
Start: 2018-10-25 | End: 2018-10-25

## 2018-10-25 ASSESSMENT — PAIN SCALES - GENERAL
PAINLEVEL: MILD PAIN (2)
PAINLEVEL: NO PAIN (0)
PAINLEVEL: NO PAIN (0)

## 2018-10-25 NOTE — PATIENT INSTRUCTIONS
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 I would talk to your cardiologist to see if he would be OK with you taking some vitamins including vitamin C 500 mg twice daily and Rutoside 50 mg twice daily. If your cardiologist says this is OK, you can buy these online or over the counter.     In regards to the lesion on your left hand, we will biopsy this today and let you know of the results.     We also discussed your feet, this is due to something called tinea pedis or \"athlete's foot\". I would like you to apply a layer of the econazole 1% cream to your feet twice daily after cleaning your feet. Remember to rub between the toes.     The lesions on the back of your neck are not worrisome. They are most consistent with moles.       Wound Care After a Biopsy    What is a skin biopsy?  A skin biopsy allows the doctor to examine a very small piece of tissue under the microscope to determine the diagnosis and the best treatment for the skin condition. A local anesthetic (numbing medicine)  is injected with a very small needle into the skin area to be tested. A small piece of skin is taken from the area. Sometimes a suture (stitch) is used.     What are the risks of a skin biopsy?  I will experience scar, bleeding, swelling, pain, crusting and redness. I may experience incomplete removal or recurrence. Risks of this procedure are excessive bleeding, bruising, infection, nerve damage, numbness, thick (hypertrophic or keloidal) scar and non-diagnostic biopsy.    How should I care for my wound for the first 24 hours?    Keep the wound dry and covered for 24 hours    If it bleeds, hold direct pressure on the area for 15 minutes. If bleeding does not stop then go to the emergency room    Avoid strenuous exercise the first 1-2 days or as your doctor instructs you    How should I care for the wound after 24 hours?    After " 24 hours, remove the bandage    You may bathe or shower as normal    If you had a scalp biopsy, you can shampoo as usual and can use shower water to clean the biopsy site daily    Clean the wound twice a day with gentle soap and water    Do not scrub, be gentle    Apply white petroleum/Vaseline after cleaning the wound with a cotton swab or a clean finger, and keep the site covered with a Bandaid /bandage. Bandages are not necessary with a scalp biopsy    If you are unable to cover the site with a Bandaid /bandage, re-apply ointment 2-3 times a day to keep the site moist. Moisture will help with healing    Avoid strenuous activity for first 1-2 days    Avoid lakes, rivers, pools, and oceans until the stitches are removed or the site is healed    How do I clean my wound?    Wash hands thoroughly with soap or use hand  before all wound care    Clean the wound with gentle soap and water    Apply white petroleum/Vaseline  to wound after it is clean    Replace the Bandaid /bandage to keep the wound covered for the first few days or as instructed by your doctor    If you had a scalp biopsy, warm shower water to the area on a daily basis should suffice    What should I use to clean my wound?     Cotton-tipped applicators (Qtips )    White petroleum jelly (Vaseline ). Use a clean new container and use Q-tips to apply.    Bandaids   as needed    Gentle soap     How should I care for my wound long term?    Do not get your wound dirty    Keep up with wound care for one week or until the area is healed.    A small scab will form and fall off by itself when the area is completely healed. The area will be red and will become pink in color as it heals. Sun protection is very important for how your scar will turn out. Sunscreen with an SPF 30 or greater is recommended once the area is healed.    If you have stitches, stitches need to be removed in ___ days. You may return to our clinic for this or you may have it done  locally at your doctor s office.    You should have some soreness but it should be mild and slowly go away over several days. Talk to your doctor about using tylenol for pain,    When should I call my doctor?  If you have increased:     Pain or swelling    Pus or drainage (clear or slightly yellow drainage is ok)    Temperature over 100F    Spreading redness or warmth around wound    When will I hear about my results?  The biopsy results can take 2-3 weeks to come back. The clinic will call you with the results, send you a [employer] message, or have you schedule a follow-up clinic or phone time to discuss the results. Contact our clinics if you do not hear from us in 3 weeks.     Who should I call with questions?    [facility]: [phone]     [facility]: [phone]    For urgent needs outside of business hours call the [facility] at [phone] and ask for the dermatology resident on call          Understanding Seborrheic Keratosis  Seborrheic keratoses are wart-like growths on the skin. These growths are not cancer. Many people get them, especially after age 30.  How to say it  [id number]   What causes seborrheic keratoses?  Doctors do not know what causes seborrheic keratoses. They may run in families. In addition, they become more common as people get older.  What are the symptoms of seborrheic keratoses?  Here is what seborrheic keratoses often look like:    They tend to be round or oval in shape. They can be very small or about as big across as a quarter.    They can appear singly or in clusters.    They tend to be tan, brown, or black in color.    The edges may be scalloped or uneven-looking.    They can have a waxy or scaly look.    They can be a bit raised, appearing to be  stuck on  the skin.    They may become red and irritated if scratched or rubbed by clothing  Sebhorreic keratoses are not painful, but they may be  itchy. They can become irritated if they are continually rubbed by skin or clothing. Seborrheic keratoses most often appear on the face, arms, chest, back, or belly.  How are seborrheic keratoses treated?  Seborrheic keratoses don t need to be treated unless they bother you. You may choose to have them removed because you find them unattractive. You may also want them removed because they get irritated and uncomfortable. A healthcare provider can remove them in an office visit. Ways that seborrheic keratoses can be removed include:    Freezing them off with liquid nitrogen    Cutting them off with a scalpel    Burning them off with electricity  What are the complications of seborrheic keratoses?  Seborrheic keratoses are not cancer, but they can look like some types of skin cancer. So it s a good idea to ask your healthcare provider to check any new skin growths. Ask your healthcare provider about any skin problem that concerns you.  When should I call my healthcare provider?  Call your healthcare provider right away if any of these occur:    You develop a lot of seborrheic keratoses very quickly    You have a sore that does not heal within a few weeks, or heals and then comes back    You have a mole or skin growth that is changing in size, shape, or color    You have a mole or skin growth that looks different on one side from the other    You have a mole or skin growth that is not the same color throughout   Date Last Reviewed: 5/1/2016 2000-2017 [employer]. [address] [place] [address]. All rights reserved. This information is not intended as a substitute for professional medical care. Always follow your healthcare professional's instructions.          CRYOTHERAPY    What is it?    Use of a very cold liquid, such as liquid nitrogen, to freeze and destroy abnormal skin cells that need to be removed    What should I expect?    Tenderness and redness    A small blister that might grow  and fill with dark purple blood. There may be crusting.    More than one treatment may be needed if the lesions do not go away.    How do I care for the treated area?    Gently wash the area with your hands when bathing.    Use a thin layer of Vaselin to help with healing. You may use a Band-Aid.     The area should heal within 7-10 days.    Do not use an antibiotic or Neosporin ointment.     You may take acetaminophen (Tylenol) for pain.     Call your Doctor if you have:    Severe pain    Signs of infection (warmth, redness, cloudy yellow drainage, and or a bad smell)    Questions or concerns    Contact infromation:  [facility] [phone]  [facility] [phone]        What is Tinea?    Tinea is a fungal infection of the skin, hair or nails. These fungal infections are named for where they occur on the body. Some examples are:      Tinea capitis (scalp)    Tinea corporis (body)    Tinea cruris (groin) -  jock itch     Tinea faciei (face)    Tinea pedis (feet) -  athlete s foot     Tinea unguium or onychomycosis (nail)    TINEA IS CONTAGIOUS    People usually get tinea by touching a person who has it. Family members and close contacts may pass the fungus back and forth. Wrestlers are particularly at risk because of skin contact during the sport. The fungus that causes tinea can also live on sheets, brushes, hats, damp floors, gym mats, in the soil, and on pets. People can get tinea from touching these things too.    Some tips to prevent spreading tinea to others or back to yourself:      Avoid sharing roth, hair brushes, hats, pillowcases and towels.    Keep roth and hair brushes clean.    Towel dry well after baths or showers. Pay special attention to body folds and feet, including the skin between toes.    Wear sandals or flip flops in locker rooms, public showers and around the pool.    Change your socks at least once daily.    WHY IS TINEA  SOMETIMES CALLED  RINGWORM ? IS IT CAUSED BY A WORM?    On much of the body and face, tinea can look like a red, scaly ring. Because of the ringed shape, it is sometimes called  ringworm  even though it is not caused by a worm.    WHAT DOES TINEA LOOK LIKE ON THE BODY?    The appearance of tinea, as well as the symptoms, may be different on different parts of the body.    Tinea capitis (scalp):  The scalp may show flakes of skin resembling dandruff. There may also be pus bumps or patches of hair loss or broken hairs. In some people, the fungus causes more inflammation with redness, crusting and weeping on the scalp, and there may be enlarged lymph nodes in the neck ( swollen glands ). When hair loss occurs, it is usually temporary, and the hair will grow back. However, if the fungus has caused too much inflammation or scarring, the hair may not grow back completely.    Tinea corporis, faciei, and cruris (body, face and groin):  These are typically the areas where the name  ringworm  is used, as the fungal infection looks like a red, scaly ring with clearing in the center. Sometimes there are multiple rings or partial rings or rings that have come together to become irregular shapes, still with the edge being red and scaly and notably clear areas in the center.    Tinea pedis ( athlete s foot ):  The skin is usually moist and flaky between the toes. There are sometimes also the red, scaly rings on top of the toes and feet, as well as flaky skin on the bottoms or sides of the feet. Sometimes, blisters may be present.    Onychomycosis (nail fungus):  This type of fungus is more common in adults than in children. Children with onychomycosis frequently have a household member who also has nail fungus. In this type of tinea, the nails get thick and yellow, and there is a buildup of loose skin and fungus under the affected nails, especially at the outer edge of the nails.    DIAGNOSIS AND TREATMENT    HOW IS TINEA  DIAGNOSED?    A healthcare professional can often diagnose tinea by doing a careful examination of your skin and nails. Special tests, such as a skin scraping or swab, may be done to make or confirm the diagnosis. The fungus can sometimes be seen by looking at the scraping under the microscope. Culture of the sample at a lab is sometimes necessary and may take four to six weeks to be complete.    HOW IS TINEA TREATED?    There are a variety of over-the-counter and prescription medications to treat tinea infections. The type and length of treatment that is recommended or prescribed will differ depending on the type of tinea.    Tinea on the face, body, groin and feet is usually treated with medications that you apply directly to the skin (topical medications). These include creams, lotions and gels. Examples are terbinafine, ketoconazole, ciclopirox and oxiconazole. These medications generally need to be used for several weeks. If the infection is extensive, oral antifungal medications may be needed.    Tinea capitis (scalp) and onychomycosis (nail fungus) usually need to be treated with prescription medications taken by mouth. These medications need to be taken for several weeks to months. Examples include griseofulvin, terbinafine, fluconazole and itraconazole. A medicated shampoo is also recommended for tinea capitis both for the person with the infection and people who live in the same house. The shampoo will not clear the infection but can prevent spread to other people.        Contributing SPD Members:  Francisco Rangel MD, Sima Ojeda MD    Committee Reviewers:  Ben Rodriguez MD, Mickie Liu MD, Laila Wright MD    Expert Reviewer:  Dom Begum MD    The Society for Pediatric Dermatology and [employer] cannot be held responsible for any errors or for any consequences arising from the use of the information contained in this handout. Handout originally published in Pediatric  Dermatology: Vol. 34, No. 4 (2017).      2017 The Society for Pediatric Dermatology  Wound Care After a Biopsy    What is a skin biopsy?  A skin biopsy allows the doctor to examine a very small piece of tissue under the microscope to determine the diagnosis and the best treatment for the skin condition. A local anesthetic (numbing medicine)  is injected with a very small needle into the skin area to be tested. A small piece of skin is taken from the area. Sometimes a suture (stitch) is used.     What are the risks of a skin biopsy?  I will experience scar, bleeding, swelling, pain, crusting and redness. I may experience incomplete removal or recurrence. Risks of this procedure are excessive bleeding, bruising, infection, nerve damage, numbness, thick (hypertrophic or keloidal) scar and non-diagnostic biopsy.    How should I care for my wound for the first 24 hours?    Keep the wound dry and covered for 24 hours    If it bleeds, hold direct pressure on the area for 15 minutes. If bleeding does not stop then go to the emergency room    Avoid strenuous exercise the first 1-2 days or as your doctor instructs you    How should I care for the wound after 24 hours?    After 24 hours, remove the bandage    You may bathe or shower as normal    If you had a scalp biopsy, you can shampoo as usual and can use shower water to clean the biopsy site daily    Clean the wound twice a day with gentle soap and water    Do not scrub, be gentle    Apply white petroleum/Vaseline after cleaning the wound with a cotton swab or a clean finger, and keep the site covered with a Bandaid /bandage. Bandages are not necessary with a scalp biopsy    If you are unable to cover the site with a Bandaid /bandage, re-apply ointment 2-3 times a day to keep the site moist. Moisture will help with healing    Avoid strenuous activity for first 1-2 days    Avoid lakes, rivers, pools, and oceans until the stitches are removed or the site is healed    How do  I clean my wound?    Wash hands thoroughly with soap or use hand  before all wound care    Clean the wound with gentle soap and water    Apply white petroleum/Vaseline  to wound after it is clean    Replace the Bandaid /bandage to keep the wound covered for the first few days or as instructed by your doctor    If you had a scalp biopsy, warm shower water to the area on a daily basis should suffice    What should I use to clean my wound?     Cotton-tipped applicators (Qtips )    White petroleum jelly (Vaseline ). Use a clean new container and use Q-tips to apply.    Bandaids   as needed    Gentle soap     How should I care for my wound long term?    Do not get your wound dirty    Keep up with wound care for one week or until the area is healed.    A small scab will form and fall off by itself when the area is completely healed. The area will be red and will become pink in color as it heals. Sun protection is very important for how your scar will turn out. Sunscreen with an SPF 30 or greater is recommended once the area is healed.    If you have stitches, stitches need to be removed in  days. You may return to our clinic for this or you may have it done locally at your doctor s office.    You should have some soreness but it should be mild and slowly go away over several days. Talk to your doctor about using tylenol for pain,    When should I call my doctor?  If you have increased:     Pain or swelling    Pus or drainage (clear or slightly yellow drainage is ok)    Temperature over 100F    Spreading redness or warmth around wound    When will I hear about my results?  The biopsy results can take 2-3 weeks to come back. The clinic will call you with the results, send you a [employer] message, or have you schedule a follow-up clinic or phone time to discuss the results. Contact our clinics if you do not hear from us in 3 weeks.     Who should I call with questions?    [facility]:  [phone]     [facility]: [phone]    For urgent needs outside of business hours call the [facility] at [phone] and ask for the dermatology resident on call  Cryotherapy    What is it?    Use of a very cold liquid, such as liquid nitrogen, to freeze and destroy abnormal skin cells that need to be removed    What should I expect?    Tenderness and redness    A small blister that might grow and fill with dark purple blood. There may be crusting.    More than one treatment may be needed if the lesions do not go away.    How do I care for the treated area?    Gently wash the area with your hands when bathing.    Use a thin layer of Vaseline to help with healing. You may use a Band-Aid.     The area should heal within 7-10 days and may leave behind a pink or lighter color.     Do not use an antibiotic or Neosporin ointment.     You may take acetaminophen (Tylenol) for pain.     Call your Doctor if you have:    Severe pain    Signs of infection (warmth, redness, cloudy yellow drainage, and or a bad smell)    Questions or concerns    Who should I call with questions?       [facility]: [phone]       [facility]: [phone]       For urgent needs outside of business hours call the [facility] at [phone]        and ask for the dermatology resident on call

## 2018-10-25 NOTE — MR AVS SNAPSHOT
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 I would talk to your cardiologist to see if he would be OK with you taking some vitamins including vitamin C 500 mg twice daily and Rutoside 50 mg twice daily. If your cardiologist says this is OK, you can buy these online or over the counter.     In regards to the lesion on your left hand, we will biopsy this today and let you know of the results.     We also discussed your feet, this is due to something called tinea pedis or \"athlete's foot\". I would like you to apply a layer of the econazole 1% cream to your feet twice daily after cleaning your feet. Remember to rub between the toes.     The lesions on the back of your neck are not worrisome. They are most consistent with moles.       Wound Care After a Biopsy    What is a skin biopsy?  A skin biopsy allows the doctor to examine a very small piece of tissue under the microscope to determine the diagnosis and the best treatment for the skin condition. A local anesthetic (numbing medicine)  is injected with a very small needle into the skin area to be tested. A small piece of skin is taken from the area. Sometimes a suture (stitch) is used.     What are the risks of a skin biopsy?  I will experience scar, bleeding, swelling, pain, crusting and redness. I may experience incomplete removal or recurrence. Risks of this procedure are excessive bleeding, bruising, infection, nerve damage, numbness, thick (hypertrophic or keloidal) scar and non-diagnostic " biopsy.    How should I care for my wound for the first 24 hours?    Keep the wound dry and covered for 24 hours    If it bleeds, hold direct pressure on the area for 15 minutes. If bleeding does not stop then go to the emergency room    Avoid strenuous exercise the first 1-2 days or as your doctor instructs you    How should I care for the wound after 24 hours?    After 24 hours, remove the bandage    You may bathe or shower as normal    If you had a scalp biopsy, you can shampoo as usual and can use shower water to clean the biopsy site daily    Clean the wound twice a day with gentle soap and water    Do not scrub, be gentle    Apply white petroleum/Vaseline after cleaning the wound with a cotton swab or a clean finger, and keep the site covered with a Bandaid /bandage. Bandages are not necessary with a scalp biopsy    If you are unable to cover the site with a Bandaid /bandage, re-apply ointment 2-3 times a day to keep the site moist. Moisture will help with healing    Avoid strenuous activity for first 1-2 days    Avoid lakes, rivers, pools, and oceans until the stitches are removed or the site is healed    How do I clean my wound?    Wash hands thoroughly with soap or use hand  before all wound care    Clean the wound with gentle soap and water    Apply white petroleum/Vaseline  to wound after it is clean    Replace the Bandaid /bandage to keep the wound covered for the first few days or as instructed by your doctor    If you had a scalp biopsy, warm shower water to the area on a daily basis should suffice    What should I use to clean my wound?     Cotton-tipped applicators (Qtips )    White petroleum jelly (Vaseline ). Use a clean new container and use Q-tips to apply.    Bandaids   as needed    Gentle soap     How should I care for my wound long term?    Do not get your wound dirty    Keep up with wound care for one week or until the area is healed.    A small scab will form and fall off by itself  when the area is completely healed. The area will be red and will become pink in color as it heals. Sun protection is very important for how your scar will turn out. Sunscreen with an SPF 30 or greater is recommended once the area is healed.    If you have stitches, stitches need to be removed in ___ days. You may return to our clinic for this or you may have it done locally at your doctor s office.    You should have some soreness but it should be mild and slowly go away over several days. Talk to your doctor about using tylenol for pain,    When should I call my doctor?  If you have increased:     Pain or swelling    Pus or drainage (clear or slightly yellow drainage is ok)    Temperature over 100F    Spreading redness or warmth around wound    When will I hear about my results?  The biopsy results can take 2-3 weeks to come back. The clinic will call you with the results, send you a [email or web address] message, or have you schedule a follow-up clinic or phone time to discuss the results. Contact our clinics if you do not hear from us in 3 weeks.     Who should I call with questions?    [facility]: [phone]     [facility]: [phone]    For urgent needs outside of business hours call the [facility] at [phone] and ask for the dermatology resident on call          Understanding Seborrheic Keratosis  Seborrheic keratoses are wart-like growths on the skin. These growths are not cancer. Many people get them, especially after age 30.  How to say it  [id number]   What causes seborrheic keratoses?  Doctors do not know what causes seborrheic keratoses. They may run in families. In addition, they become more common as people get older.  What are the symptoms of seborrheic keratoses?  Here is what seborrheic keratoses often look like:    They tend to be round or oval in shape. They can be very small or about as big across as a  quarter.    They can appear singly or in clusters.    They tend to be tan, brown, or black in color.    The edges may be scalloped or uneven-looking.    They can have a waxy or scaly look.    They can be a bit raised, appearing to be  stuck on  the skin.    They may become red and irritated if scratched or rubbed by clothing  Sebhorreic keratoses are not painful, but they may be itchy. They can become irritated if they are continually rubbed by skin or clothing. Seborrheic keratoses most often appear on the face, arms, chest, back, or belly.  How are seborrheic keratoses treated?  Seborrheic keratoses don t need to be treated unless they bother you. You may choose to have them removed because you find them unattractive. You may also want them removed because they get irritated and uncomfortable. A healthcare provider can remove them in an office visit. Ways that seborrheic keratoses can be removed include:    Freezing them off with liquid nitrogen    Cutting them off with a scalpel    Burning them off with electricity  What are the complications of seborrheic keratoses?  Seborrheic keratoses are not cancer, but they can look like some types of skin cancer. So it s a good idea to ask your healthcare provider to check any new skin growths. Ask your healthcare provider about any skin problem that concerns you.  When should I call my healthcare provider?  Call your healthcare provider right away if any of these occur:    You develop a lot of seborrheic keratoses very quickly    You have a sore that does not heal within a few weeks, or heals and then comes back    You have a mole or skin growth that is changing in size, shape, or color    You have a mole or skin growth that looks different on one side from the other    You have a mole or skin growth that is not the same color throughout   Date Last Reviewed: 5/1/2016 2000-2017 [employer]. [address] [place] [address], [place] [address]. All rights reserved.  This information is not intended as a substitute for professional medical care. Always follow your healthcare professional's instructions.          CRYOTHERAPY    What is it?    Use of a very cold liquid, such as liquid nitrogen, to freeze and destroy abnormal skin cells that need to be removed    What should I expect?    Tenderness and redness    A small blister that might grow and fill with dark purple blood. There may be crusting.    More than one treatment may be needed if the lesions do not go away.    How do I care for the treated area?    Gently wash the area with your hands when bathing.    Use a thin layer of Vaselin to help with healing. You may use a Band-Aid.     The area should heal within 7-10 days.    Do not use an antibiotic or Neosporin ointment.     You may take acetaminophen (Tylenol) for pain.     Call your Doctor if you have:    Severe pain    Signs of infection (warmth, redness, cloudy yellow drainage, and or a bad smell)    Questions or concerns    Contact infromation:  [facility] [phone]  [facility] [phone]        What is Tinea?    Tinea is a fungal infection of the skin, hair or nails. These fungal infections are named for where they occur on the body. Some examples are:      Tinea capitis (scalp)    Tinea corporis (body)    Tinea cruris (groin) -  jock itch     Tinea faciei (face)    Tinea pedis (feet) -  athlete s foot     Tinea unguium or onychomycosis (nail)    TINEA IS CONTAGIOUS    People usually get tinea by touching a person who has it. Family members and close contacts may pass the fungus back and forth. Wrestlers are particularly at risk because of skin contact during the sport. The fungus that causes tinea can also live on sheets, brushes, hats, damp floors, gym mats, in the soil, and on pets. People can get tinea from touching these things too.    Some tips to prevent spreading tinea to others or back to  yourself:      Avoid sharing roth, hair brushes, hats, pillowcases and towels.    Keep roth and hair brushes clean.    Towel dry well after baths or showers. Pay special attention to body folds and feet, including the skin between toes.    Wear sandals or flip flops in locker rooms, public showers and around the pool.    Change your socks at least once daily.    WHY IS TINEA SOMETIMES CALLED  RINGWORM ? IS IT CAUSED BY A WORM?    On much of the body and face, tinea can look like a red, scaly ring. Because of the ringed shape, it is sometimes called  ringworm  even though it is not caused by a worm.    WHAT DOES TINEA LOOK LIKE ON THE BODY?    The appearance of tinea, as well as the symptoms, may be different on different parts of the body.    Tinea capitis (scalp):  The scalp may show flakes of skin resembling dandruff. There may also be pus bumps or patches of hair loss or broken hairs. In some people, the fungus causes more inflammation with redness, crusting and weeping on the scalp, and there may be enlarged lymph nodes in the neck ( swollen glands ). When hair loss occurs, it is usually temporary, and the hair will grow back. However, if the fungus has caused too much inflammation or scarring, the hair may not grow back completely.    Tinea corporis, faciei, and cruris (body, face and groin):  These are typically the areas where the name  ringworm  is used, as the fungal infection looks like a red, scaly ring with clearing in the center. Sometimes there are multiple rings or partial rings or rings that have come together to become irregular shapes, still with the edge being red and scaly and notably clear areas in the center.    Tinea pedis ( athlete s foot ):  The skin is usually moist and flaky between the toes. There are sometimes also the red, scaly rings on top of the toes and feet, as well as flaky skin on the bottoms or sides of the feet. Sometimes, blisters may be present.    Onychomycosis (nail  fungus):  This type of fungus is more common in adults than in children. Children with onychomycosis frequently have a household member who also has nail fungus. In this type of tinea, the nails get thick and yellow, and there is a buildup of loose skin and fungus under the affected nails, especially at the outer edge of the nails.    DIAGNOSIS AND TREATMENT    HOW IS TINEA DIAGNOSED?    A healthcare professional can often diagnose tinea by doing a careful examination of your skin and nails. Special tests, such as a skin scraping or swab, may be done to make or confirm the diagnosis. The fungus can sometimes be seen by looking at the scraping under the microscope. Culture of the sample at a lab is sometimes necessary and may take four to six weeks to be complete.    HOW IS TINEA TREATED?    There are a variety of over-the-counter and prescription medications to treat tinea infections. The type and length of treatment that is recommended or prescribed will differ depending on the type of tinea.    Tinea on the face, body, groin and feet is usually treated with medications that you apply directly to the skin (topical medications). These include creams, lotions and gels. Examples are terbinafine, ketoconazole, ciclopirox and oxiconazole. These medications generally need to be used for several weeks. If the infection is extensive, oral antifungal medications may be needed.    Tinea capitis (scalp) and onychomycosis (nail fungus) usually need to be treated with prescription medications taken by mouth. These medications need to be taken for several weeks to months. Examples include griseofulvin, terbinafine, fluconazole and itraconazole. A medicated shampoo is also recommended for tinea capitis both for the person with the infection and people who live in the same house. The shampoo will not clear the infection but can prevent spread to other people.        Contributing SPD Members:  Francisco Rangel MD, Sima  MD Rutsy    Committee Reviewers:  Ben Rodriguez MD, Mickie Liu MD, Laila Wright MD    Expert Reviewer:  Dom Begum MD    The Society for Pediatric Dermatology [employer] cannot be held responsible for any errors or for any consequences arising from the use of the information contained in this handout. Handout originally published in Pediatric Dermatology: Vol. 34, No. 4 (2017).      2017 The Society for Pediatric Dermatology  Wound Care After a Biopsy    What is a skin biopsy?  A skin biopsy allows the doctor to examine a very small piece of tissue under the microscope to determine the diagnosis and the best treatment for the skin condition. A local anesthetic (numbing medicine)  is injected with a very small needle into the skin area to be tested. A small piece of skin is taken from the area. Sometimes a suture (stitch) is used.     What are the risks of a skin biopsy?  I will experience scar, bleeding, swelling, pain, crusting and redness. I may experience incomplete removal or recurrence. Risks of this procedure are excessive bleeding, bruising, infection, nerve damage, numbness, thick (hypertrophic or keloidal) scar and non-diagnostic biopsy.    How should I care for my wound for the first 24 hours?    Keep the wound dry and covered for 24 hours    If it bleeds, hold direct pressure on the area for 15 minutes. If bleeding does not stop then go to the emergency room    Avoid strenuous exercise the first 1-2 days or as your doctor instructs you    How should I care for the wound after 24 hours?    After 24 hours, remove the bandage    You may bathe or shower as normal    If you had a scalp biopsy, you can shampoo as usual and can use shower water to clean the biopsy site daily    Clean the wound twice a day with gentle soap and water    Do not scrub, be gentle    Apply white petroleum/Vaseline after cleaning the wound with a cotton swab or a clean finger, and keep the site covered  with a Bandaid /bandage. Bandages are not necessary with a scalp biopsy    If you are unable to cover the site with a Bandaid /bandage, re-apply ointment 2-3 times a day to keep the site moist. Moisture will help with healing    Avoid strenuous activity for first 1-2 days    Avoid lakes, rivers, pools, and oceans until the stitches are removed or the site is healed    How do I clean my wound?    Wash hands thoroughly with soap or use hand  before all wound care    Clean the wound with gentle soap and water    Apply white petroleum/Vaseline  to wound after it is clean    Replace the Bandaid /bandage to keep the wound covered for the first few days or as instructed by your doctor    If you had a scalp biopsy, warm shower water to the area on a daily basis should suffice    What should I use to clean my wound?     Cotton-tipped applicators (Qtips )    White petroleum jelly (Vaseline ). Use a clean new container and use Q-tips to apply.    Bandaids   as needed    Gentle soap     How should I care for my wound long term?    Do not get your wound dirty    Keep up with wound care for one week or until the area is healed.    A small scab will form and fall off by itself when the area is completely healed. The area will be red and will become pink in color as it heals. Sun protection is very important for how your scar will turn out. Sunscreen with an SPF 30 or greater is recommended once the area is healed.    If you have stitches, stitches need to be removed in  days. You may return to our clinic for this or you may have it done locally at your doctor s office.    You should have some soreness but it should be mild and slowly go away over several days. Talk to your doctor about using tylenol for pain,    When should I call my doctor?  If you have increased:     Pain or swelling    Pus or drainage (clear or slightly yellow drainage is ok)    Temperature over 100F    Spreading redness or warmth around wound    When  will I hear about my results?  The biopsy results can take 2-3 weeks to come back. The clinic will call you with the results, send you a [employer] message, or have you schedule a follow-up clinic or phone time to discuss the results. Contact our clinics if you do not hear from us in 3 weeks.     Who should I call with questions?    [facility]: [phone]     [facility]: [phone]    For urgent needs outside of business hours call the [facility] at [phone] and ask for the dermatology resident on call  Cryotherapy    What is it?    Use of a very cold liquid, such as liquid nitrogen, to freeze and destroy abnormal skin cells that need to be removed    What should I expect?    Tenderness and redness    A small blister that might grow and fill with dark purple blood. There may be crusting.    More than one treatment may be needed if the lesions do not go away.    How do I care for the treated area?    Gently wash the area with your hands when bathing.    Use a thin layer of Vaseline to help with healing. You may use a Band-Aid.     The area should heal within 7-10 days and may leave behind a pink or lighter color.     Do not use an antibiotic or Neosporin ointment.     You may take acetaminophen (Tylenol) for pain.     Call your Doctor if you have:    Severe pain    Signs of infection (warmth, redness, cloudy yellow drainage, and or a bad smell)    Questions or concerns    Who should I call with questions?       [facility]: [phone]       [facility]: [phone]       For urgent needs outside of business hours call the [facility] at [phone]        and ask for the dermatology resident on call              Follow-ups after your visit        Follow-up notes from your care team     Return in about 1 year (around 10/25/2019).      Who to contact     Please call your  clinic at [phone] to:    Ask questions about your health    Make or cancel appointments    Discuss your medicines    Learn about your test results    Speak to your doctor            Additional Information About Your Visit        MyChart Information     [employer] is an electronic gateway that provides easy, online access to your medical records. With [employer], you can request a clinic appointment, read your test results, renew a prescription or communicate with your care team.     To sign up for [employer] visit the website at [email or web address]   You will be asked to enter the access code listed below, as well as some personal information. Please follow the directions to create your username and password.     Your access code is: [email or web address]  Expires: 2019  6:30 AM     Your access code will  in 90 days. If you need help or a new code, please contact your [facility] or call [phone] for assistance.        Care EveryWhere ID     This is your Care EveryWhere ID. This could be used by other organizations to access your [place] medical records  [id number]         Blood Pressure from Last 3 Encounters:   18 130/77   18 113/69   10/07/15 113/69    Weight from Last 3 Encounters:   18 77.6 kg (171 lb)              Today, you had the following     No orders found for display         Today's Medication Changes          These changes are accurate as of 10/25/18  2:07 PM.  If you have any questions, ask your nurse or doctor.               Start taking these medicines.        Dose/Directions    econazole nitrate 1 % cream   Used for:  Neoplasm of uncertain behavior of skin   Started by:  Basil Stone MD        Apply thin layer to bottom of feet twice daily after washing the feet. Don't forget to apply between the toes.   Quantity:  85 g   Refills:  11            Where to get your medicines      These medications were sent to [facility] # [id number] -  [facility] [address] [facility]  [address] [facility] [address]     Phone:  [phone]     econazole nitrate 1 % cream                Primary Care Provider Office Phone # Fax #    Shelton Palomino [phone]       [facility] [address]   [place] [address]        Equal Access to Services     JOYA RED : Hadii aad ku hadasho Soomaali, waaxda luqadaha, qaybta kaalmada adeegyada, waxay idiin hayaan adeeg kharash la'aan ah. So [facility] [phone].    ATENCIÓN: Si habla español, tiene a clark disposición servicios gratuitos de asistencia lingüística. Elenita al [phone].    We comply with applicable federal civil rights laws and [place] laws. We do not discriminate on the basis of race, color, national origin, age, disability, sex, sexual orientation, or gender identity.            Thank you!     Thank you for choosing [facility]  for your care. Our goal is always to provide you with excellent care. Hearing back from our patients is one way we can continue to improve our services. Please take a few minutes to complete the written survey that you may receive in the mail after your visit with us. Thank you!             Your Updated Medication List - Protect others around you: Learn how to safely use, store and throw away your medicines at www.disposemymeds.org.          This list is accurate as of 10/25/18  2:07 PM.  Always use your most recent med list.                   Brand Name Dispense Instructions for use Diagnosis    aspirin 325 MG tablet      Take 81 mg by mouth daily        atenolol 25 MG tablet    TENORMIN     Take 50 mg by mouth daily        econazole nitrate 1 % cream     85 g    Apply thin layer to bottom of feet twice daily after washing the feet. Don't forget to apply between the toes.    Neoplasm of uncertain behavior of skin       ketorolac 0.5 % ophthalmic solution    ACULAR    1 Bottle    Place 1 drop Into the left eye 4 times daily    Nuclear  senile cataract of left eye       ofloxacin 0.3 % ophthalmic solution    OCUFLOX    1 Bottle    Place 1 drop Into the left eye 4 times daily    Nuclear senile cataract of left eye       prednisoLONE acetate 1 % ophthalmic susp    PRED FORTE    1 Bottle    Place 1 drop Into the left eye 4 times daily    Nuclear senile cataract of left eye       rivaroxaban ANTICOAGULANT 20 MG Tabs tablet    XARELTO     Take 20 mg by mouth daily (with dinner)        simvastatin 40 MG tablet    ZOCOR     Take by mouth At Bedtime

## 2018-10-25 NOTE — PROGRESS NOTES
"[facility] Dermatology Note    Dermatology Problem List:  1. H/o basal cell carcinoma on the nose   - s/p excision in 2015, requires annual skin checks  - Most recent TBSE 10/25/18  2. Seborrheic keratoses  3. NUB biopsied 10/25/18  - Left dorsal hand  - Ddx includes keratoacanthoma, invasive SCC, BCC, AK versus other  4. Tinea pedis, bilateral   Current tx: econazole 1% cream BID   5. Onychomycosis, bilateral   - We discussed oral medications and concluded that the risks likely outweigh the benefits given his age and medical hx   6. Senile purpura   - Pt will discuss vitamins with cardiologist including Vitamin C 500 mg BID and Rutoside 50 mg BID    Encounter Date: Oct 25, 2018    CC:  Chief Complaint   Patient presents with     Skin Check     Gerard has a few spots of concern today. Personal hx of BCC.     History of Present Illness:  Mr. Gerard Manuel is a 82 year old male with hx of NMSC who presents as a follow-up for skin check. The patient was last seen on 4/13/17 at which time you biopsied a spot on the left hand. Today, he endorses a few concerning lesions including one on the left dorsal hand. He does not believe it is the same spot that was previously sampled. He states it appeared about a month ago and does not itch or bleed. He endorses it is tender when he pushes hard on it. He wonders if it is the result of some trauma to the hand. Gerard also also endorses concerns regarding easy bruising. He shares he bruises much more easily now and wonders if it could be due to his older age. He does take xarelto. He endorses more bruising on the forearms. He is right hand dominant and admits to more bruising on the right forearm and hand.     He also endorses concern regarding few \"red bumps\" on the back of his neck. His wife noticed them since he is unable to visualize them. He wonders if these are benign. Finally, he endorses shedding of the skin on his feet. He also has thick toenails and " has discussed oral medications before with doctors, and ultimately the decision was made not to treat his toenail infection. He has tried Lamisil in the past but nothing else with the exception of lotions. He is otherwise feeling well. He endorses recent cold about a month ago with full resolution. He has no other questions or concerns.     Past Medical History:   Patient Active Problem List   Diagnosis     Blepharitis     BCC (basal cell carcinoma), face     Inflamed seborrheic keratosis     Tinea pedis     History of basal cell carcinoma     Seborrheic keratoses, inflamed     Hypertrophic scar     Neoplasm of uncertain behavior of skin     Past Medical History:   Diagnosis Date     Arthritis      Atrial flutter (H)      Blepharitis      Cataracts, both eyes      Hypertension     on meds     MGD (meibomian gland dysfunction)      Pacemaker      Past Surgical History:   Procedure Laterality Date     APPENDECTOMY       CARDIAC SURGERY      CABGx4     CATARACT IOL, RT/LT Left 07/09/2018     CATARACT IOL, RT/LT Right 07/23/2018     MOHS MICROGRAPHIC PROCEDURE       PHACOEMULSIFICATION CLEAR CORNEA WITH STANDARD INTRAOCULAR LENS IMPLANT Left 7/9/2018    Procedure: PHACOEMULSIFICATION CLEAR CORNEA WITH STANDARD INTRAOCULAR LENS IMPLANT;  LEFT EYE PHACOEMULSIFICATION CLEAR CORNEA WITH STANDARD INTRAOCULAR LENS IMPLANT ;  Surgeon: Gerardo Peña MD;  Location: [facility]     PHACOEMULSIFICATION CLEAR CORNEA WITH STANDARD INTRAOCULAR LENS IMPLANT Right 7/23/2018    Procedure: PHACOEMULSIFICATION CLEAR CORNEA WITH STANDARD INTRAOCULAR LENS IMPLANT;  RIGHT EYE PHACOEMULSIFICATION CLEAR CORNEA WITH STANDARD INTRAOCULAR LENS IMPLANT ;  Surgeon: Gerardo Peña MD;  Location: [facility]     Quadruple Bypass Surgery  1992       Social History:  Patient  reports that he has never smoked. He has never used smokeless tobacco. He reports that he does not drink alcohol or use illicit drugs.    Family History:  Family History    Problem Relation Age of Onset     Cancer Brother      skin cancer     Skin Cancer Brother      Skin Cancer Sister      Glaucoma No family hx of      Macular Degeneration No family hx of        Medications:  Current Outpatient Prescriptions   Medication Sig Dispense Refill     aspirin 325 MG tablet Take 81 mg by mouth daily       atenolol (TENORMIN) 25 MG tablet Take 50 mg by mouth daily       rivaroxaban ANTICOAGULANT (XARELTO) 20 MG TABS tablet Take 20 mg by mouth daily (with dinner)       simvastatin (ZOCOR) 40 MG tablet Take by mouth At Bedtime       ketorolac (ACULAR) 0.5 % ophthalmic solution Place 1 drop Into the left eye 4 times daily (Patient not taking: Reported on 8/21/2018) 1 Bottle 0     ofloxacin (OCUFLOX) 0.3 % ophthalmic solution Place 1 drop Into the left eye 4 times daily (Patient not taking: Reported on 8/21/2018) 1 Bottle 0     prednisoLONE acetate (PRED FORTE) 1 % ophthalmic susp Place 1 drop Into the left eye 4 times daily (Patient not taking: Reported on 8/21/2018) 1 Bottle 0     No Known Allergies      Review of Systems:  -As per HPI  -Constitutional: The patient denies fatigue, fevers, chills, unintended weight loss, and night sweats.  -HEENT: Patient denies nonhealing oral sores.  -Skin: As above in HPI. No additional skin concerns.    Physical exam:  Vitals: There were no vitals taken for this visit.  GEN: This is a well developed, well-nourished male in no acute distress, in a pleasant mood.    SKIN: Total skin excluding the undergarment areas was performed. The exam included the head/face, neck, both arms, chest, back, abdomen, both legs, digits and/or nails.   - There are tan to brown waxy, stuck on papules located on the right lateral leg and scattered throughout the chest, back, upper and lower extremities, left temple and forehead.   - Several scattered 1-2 cm round violaceous patches consistent with purpura appreciated on the right and left arms   - There are 3 several monomorphic  flesh-colored dome-shaped and firm papules that do not dimple on the posterior neck  - Numerous scattered brown to tan macules on the bilateral extremities, chest and forehead.   - There is a 1.5 cm round, flesh-colored dome-shaped nodule with a subtle central keratinous plug. It is well-demarcated with overlying rough crust appreciated on the left dorsal hand.   - Few small, dome shaped cherry red papules on trunk and chest  - No other lesions of concern on areas examined.     Impression/Plan:  1. History of BCC, now with neoplasm of uncertain behavior on the left dorsal hand appreciated on today's exam.      The differential diagnosis includes keratoacanthoma, invasive SCC, BCC, AK versus other.    After discussion of benefits and risks including but not limited to bleeding, infection, scar, incomplete removal, recurrence, and non-diagnostic biopsy, written consent and photographs were obtained. The area was cleaned with isopropyl alcohol. 3 mL of 1% lidocaine with epinephrine was injected to obtain adequate anesthesia of the lesion on the dorsal surface of the left hand. A shave biopsy was performed. Hemostasis was achieved with aluminium chloride. Vaseline and a sterile dressing were applied. The patient tolerated the procedure and no complications were noted. The patient was provided with verbal and written post care instructions.     2. Senile purpura      Benign nature was discussed. We discussed that it is likely the result of his older age and his blood thinning medications.    We discussed taking vitamins such as Vitamin C 500 mg BID and Rutoside 50 mg BID. This may possible decrease the incidence of bruising however, I would like him to run this by his cardiologist prior to starting. We discussed this at length and including this in his AVS instructions.     3. Seborrheic keratosis, inflamed located on the left upper arm and right lateral thigh     Benign nature was discussed. Given that the one on his  right lateral thigh is bothersome and causes him to pick, I offered to freeze it today. Patient would like to pursue this. Cryotherapy procedure note (performed by faculty): After verbal consent and discussion of risks and benefits including but no limited to dyspigmentation/scar, blister, infection, recurrence,1 was(were) treated with 1-2mm freeze border for 2 cycles with liquid nitrogen. Post cryotherapy instructions were provided.     4. Tinea pedis, onychomycosis     We discussed the risks and benefits of oral antifungal medication such as terbinafine. I agree that the risks of this medication likely outweigh the benefits. In addition, the toenails do not bother the patient. We discussed treatment with a topical cream such as econazole 1% on the feet twice daily. Patient expressed understanding and his questions were answered.       Follow-up pending results of biopsy, if benign I would like to see him back in one year, sooner if needed.     Staff Involved:  I, Claire Rojo, MS4, am acting as the scribe for Basil Stone MD. All aspects of the exam and documentation were approved by the attending physician.    Claire Rojo, MS4   [facility]  Pager: [phone]    The medical student acted as a scribe with respect to this patient.  I have performed all the key elements of the history and physical, as well as all procedures.    Basil Stone MD  Dermatology Attending

## 2018-10-25 NOTE — LETTER
10/25/2018       RE: Gerard Manuel  [address] [place] [address]     Dear Colleague,    Thank you for referring your patient, Gerard Manuel, to the [facility] DERMATOLOGY at [facility]. Please see a copy of my visit note below.    [facility] Dermatology Note    Dermatology Problem List:  1. H/o basal cell carcinoma on the nose   - s/p excision in 2015, requires annual skin checks  - Most recent TBSE 10/25/18  2. Seborrheic keratoses  3. NUB biopsied 10/25/18  - Left dorsal hand  - Ddx includes keratoacanthoma, invasive SCC, BCC, AK versus other  4. Tinea pedis, bilateral   Current tx: econazole 1% cream BID   5. Onychomycosis, bilateral   - We discussed oral medications and concluded that the risks likely outweigh the benefits given his age and medical hx   6. Senile purpura   - Pt will discuss vitamins with cardiologist including Vitamin C 500 mg BID and Rutoside 50 mg BID    Encounter Date: Oct 25, 2018    CC:  Chief Complaint   Patient presents with     Skin Check     Gerard has a few spots of concern today. Personal hx of BCC.     History of Present Illness:  Mr. Gerard Manuel is a 82 year old male with hx of NMSC who presents as a follow-up for skin check. The patient was last seen on 4/13/17 at which time you biopsied a spot on the left hand. Today, he endorses a few concerning lesions including one on the left dorsal hand. He does not believe it is the same spot that was previously sampled. He states it appeared about a month ago and does not itch or bleed. He endorses it is tender when he pushes hard on it. He wonders if it is the result of some trauma to the hand. Gerard also also endorses concerns regarding easy bruising. He shares he bruises much more easily now and wonders if it could be due to his older age. He does take xarelto. He endorses more bruising on the forearms. He is right hand dominant and admits to more bruising on  "the right forearm and hand.     He also endorses concern regarding few \"red bumps\" on the back of his neck. His wife noticed them since he is unable to visualize them. He wonders if these are benign. Finally, he endorses shedding of the skin on his feet. He also has thick toenails and has discussed oral medications before with doctors, and ultimately the decision was made not to treat his toenail infection. He has tried Lamisil in the past but nothing else with the exception of lotions. He is otherwise feeling well. He endorses recent cold about a month ago with full resolution. He has no other questions or concerns.     Past Medical History:   Patient Active Problem List   Diagnosis     Blepharitis     BCC (basal cell carcinoma), face     Inflamed seborrheic keratosis     Tinea pedis     History of basal cell carcinoma     Seborrheic keratoses, inflamed     Hypertrophic scar     Neoplasm of uncertain behavior of skin     Past Medical History:   Diagnosis Date     Arthritis      Atrial flutter (H)      Blepharitis      Cataracts, both eyes      Hypertension     on meds     MGD (meibomian gland dysfunction)      Pacemaker      Past Surgical History:   Procedure Laterality Date     APPENDECTOMY       CARDIAC SURGERY      CABGx4     CATARACT IOL, RT/LT Left 07/09/2018     CATARACT IOL, RT/LT Right 07/23/2018     MOHS MICROGRAPHIC PROCEDURE       PHACOEMULSIFICATION CLEAR CORNEA WITH STANDARD INTRAOCULAR LENS IMPLANT Left 7/9/2018    Procedure: PHACOEMULSIFICATION CLEAR CORNEA WITH STANDARD INTRAOCULAR LENS IMPLANT;  LEFT EYE PHACOEMULSIFICATION CLEAR CORNEA WITH STANDARD INTRAOCULAR LENS IMPLANT ;  Surgeon: Gerardo Peña MD;  Location: [facility]     PHACOEMULSIFICATION CLEAR CORNEA WITH STANDARD INTRAOCULAR LENS IMPLANT Right 7/23/2018    Procedure: PHACOEMULSIFICATION CLEAR CORNEA WITH STANDARD INTRAOCULAR LENS IMPLANT;  RIGHT EYE PHACOEMULSIFICATION CLEAR CORNEA WITH STANDARD INTRAOCULAR LENS IMPLANT ;  Surgeon: " Gerardo Peña MD;  Location:  [facility]     Quadruple Bypass Surgery  1992       Social History:  Patient  reports that he has never smoked. He has never used smokeless tobacco. He reports that he does not drink alcohol or use illicit drugs.    Family History:  Family History   Problem Relation Age of Onset     Cancer Brother      skin cancer     Skin Cancer Brother      Skin Cancer Sister      Glaucoma No family hx of      Macular Degeneration No family hx of        Medications:  Current Outpatient Prescriptions   Medication Sig Dispense Refill     aspirin 325 MG tablet Take 81 mg by mouth daily       atenolol (TENORMIN) 25 MG tablet Take 50 mg by mouth daily       rivaroxaban ANTICOAGULANT (XARELTO) 20 MG TABS tablet Take 20 mg by mouth daily (with dinner)       simvastatin (ZOCOR) 40 MG tablet Take by mouth At Bedtime       ketorolac (ACULAR) 0.5 % ophthalmic solution Place 1 drop Into the left eye 4 times daily (Patient not taking: Reported on 8/21/2018) 1 Bottle 0     ofloxacin (OCUFLOX) 0.3 % ophthalmic solution Place 1 drop Into the left eye 4 times daily (Patient not taking: Reported on 8/21/2018) 1 Bottle 0     prednisoLONE acetate (PRED FORTE) 1 % ophthalmic susp Place 1 drop Into the left eye 4 times daily (Patient not taking: Reported on 8/21/2018) 1 Bottle 0     No Known Allergies      Review of Systems:  -As per HPI  -Constitutional: The patient denies fatigue, fevers, chills, unintended weight loss, and night sweats.  -HEENT: Patient denies nonhealing oral sores.  -Skin: As above in HPI. No additional skin concerns.    Physical exam:  Vitals: There were no vitals taken for this visit.  GEN: This is a well developed, well-nourished male in no acute distress, in a pleasant mood.    SKIN: Total skin excluding the undergarment areas was performed. The exam included the head/face, neck, both arms, chest, back, abdomen, both legs, digits and/or nails.   - There are tan to brown waxy, stuck on papules  located on the right lateral leg and scattered throughout the chest, back, upper and lower extremities, left temple and forehead.   - Several scattered 1-2 cm round violaceous patches consistent with purpura appreciated on the right and left arms   - There are 3 several monomorphic flesh-colored dome-shaped and firm papules that do not dimple on the posterior neck  - Numerous scattered brown to tan macules on the bilateral extremities, chest and forehead.   - There is a 1.5 cm round, flesh-colored dome-shaped nodule with a subtle central keratinous plug. It is well-demarcated with overlying rough crust appreciated on the left dorsal hand.   - Few small, dome shaped cherry red papules on trunk and chest  - No other lesions of concern on areas examined.     Impression/Plan:  1. History of BCC, now with neoplasm of uncertain behavior on the left dorsal hand appreciated on today's exam.      The differential diagnosis includes keratoacanthoma, invasive SCC, BCC, AK versus other.    After discussion of benefits and risks including but not limited to bleeding, infection, scar, incomplete removal, recurrence, and non-diagnostic biopsy, written consent and photographs were obtained. The area was cleaned with isopropyl alcohol. 3 mL of 1% lidocaine with epinephrine was injected to obtain adequate anesthesia of the lesion on the dorsal surface of the left hand. A shave biopsy was performed. Hemostasis was achieved with aluminium chloride. Vaseline and a sterile dressing were applied. The patient tolerated the procedure and no complications were noted. The patient was provided with verbal and written post care instructions.     2. Senile purpura      Benign nature was discussed. We discussed that it is likely the result of his older age and his blood thinning medications.    We discussed taking vitamins such as Vitamin C 500 mg BID and Rutoside 50 mg BID. This may possible decrease the incidence of bruising however, I would  like him to run this by his cardiologist prior to starting. We discussed this at length and including this in his AVS instructions.     3. Seborrheic keratosis, inflamed located on the left upper arm and right lateral thigh     Benign nature was discussed. Given that the one on his right lateral thigh is bothersome and causes him to pick, I offered to freeze it today. Patient would like to pursue this. Cryotherapy procedure note (performed by faculty): After verbal consent and discussion of risks and benefits including but no limited to dyspigmentation/scar, blister, infection, recurrence,1 was(were) treated with 1-2mm freeze border for 2 cycles with liquid nitrogen. Post cryotherapy instructions were provided.     4. Tinea pedis, onychomycosis     We discussed the risks and benefits of oral antifungal medication such as terbinafine. I agree that the risks of this medication likely outweigh the benefits. In addition, the toenails do not bother the patient. We discussed treatment with a topical cream such as econazole 1% on the feet twice daily. Patient expressed understanding and his questions were answered.       Follow-up pending results of biopsy, if benign I would like to see him back in one year, sooner if needed.     Staff Involved:  I, Claire Rojo, MS4, am acting as the scribe for Basil Stone MD. All aspects of the exam and documentation were approved by the attending physician.    Claire Rojo MS4   [facility]  Pager: [phone]    The medical student acted as a scribe with respect to this patient.  I have performed all the key elements of the history and physical, as well as all procedures.    Basil Stone MD  Dermatology Attending

## 2018-10-25 NOTE — NURSING NOTE
Dermatology Rooming Note    Gerard Tiradodelmi's goals for this visit include:   Chief Complaint   Patient presents with     Skin Check     Gerard has a few spots of concern today. Personal hx of BCC.     Valarie Winchester, CMA

## 2018-10-26 LAB — COPATH REPORT: NORMAL

## 2018-10-30 ENCOUNTER — TELEPHONE (OUTPATIENT)
Dept: DERMATOLOGY | Facility: CLINIC | Age: 83
End: 2018-10-30

## 2018-10-30 DIAGNOSIS — C44.629 SQUAMOUS CELL CANCER OF SKIN OF HAND, LEFT: Primary | ICD-10-CM

## 2018-10-30 NOTE — TELEPHONE ENCOUNTER
Notes Recorded by Valarie Winchester CMA on 10/30/2018 at 10:08 AM  I spoke to Gerard Manuel and gave results. Patient understood and had no further questions or concerns. Referral placed, consult scheduled.  ------    Notes Recorded by Basil Stone MD on 10/28/2018 at 9:59 PM  Valarie,    Can you please let Gerard know that, as expected, the spot on his hand was a squamous cell (non-melanoma) skin cancer?  Mohs seems to be the best treatment given the location on his hand.  Could you please give him a referral to Dr. Serrano?    Thanks,  Lenard

## 2018-10-31 ENCOUNTER — OFFICE VISIT (OUTPATIENT)
Dept: DERMATOLOGY | Facility: CLINIC | Age: 83
End: 2018-10-31
Payer: MEDICARE

## 2018-10-31 ENCOUNTER — TELEPHONE (OUTPATIENT)
Dept: DERMATOLOGY | Facility: CLINIC | Age: 83
End: 2018-10-31

## 2018-10-31 DIAGNOSIS — C44.629 SQUAMOUS CELL CARCINOMA OF DORSUM OF LEFT HAND: Primary | ICD-10-CM

## 2018-10-31 ASSESSMENT — PAIN SCALES - GENERAL: PAINLEVEL: NO PAIN (0)

## 2018-10-31 NOTE — PATIENT INSTRUCTIONS
Mohs Cutaneous Micrographic Surgery Unit  Hiren Serrano MD      Pre-Surgical Instruction Sheet    1. Expect to be here majority of the morning.  The Mohs surgical procedure can be an extensive surgery requiring multiple stages. Each stage may take 1-2 hours.    ? You may eat breakfast  ? You may bring snacks or beverages with you  ? Take all normal medications morning of surgery -- unless otherwise indicated by your physician  ? If you have an artificial heart valve, or joint replacement within two years, we will prescribe an antibiotic. Please take one hour prior to surgery.    2. You will need a  to be with you if your surgical site is close to your eyes. You can get swelling/bruising immediately after surgery and will go home with a big bulky bandage that could obstruct your view of driving safely.    3. Wear comfortable clothing -- preferably a button down shirt or a loose fitted shirt. (to avoid pulling over pressure bandage off when you get home) If your surgery site is on your leg, try wearing loose fitted pants. If your surgery site is on your arm, try wearing a short-sleeve shirt.    4. Bring reading material or other items to help pass time. We do have internet access available if you own a laptop, iPad, etc.)    5. Women - do NOT wear make-up. We will be washing it off anyone needing surgery on the face    6. Do NOT stop blood thinning medication unless instructed to do so by your surgeon. This will include: Warfarin, Coumadin, Jantoven, Aspirin, Plavix and Pradaxa. If you are taking Warfarin or Coumadin, please have your INR blood test results sent to our office no more than 7 days prior to your surgery.     7. Tylenol is a good alternative to take for headaches and pain, and can be taken throughout your surgery and postoperative recovery.    8. If your surgery is on your trunk, arms, hands, legs, feet, fingernail or a toenail, please wash the area with Hibiclens, an over-the-counter antiseptic soap,  the night before and morning of your surgery.     If you have any questions, please feel free to contact us.    Phone numbers:  During business hours (M-F 8:00-4:30 p.m.)  [facility]:  [phone] - select option 1 for appt. desk  [phone] - select option 3 for nurse triage line  [facility]:   [phone] - goes straight to call center   ---------------------------------------------------------  Evenings/Weekends/Holidays  Hospital - [phone]   TTY for hearing gwpkunip-[phone]  *Ask  to page dermatologist on-call  Emergency Ozor-[phone]  TTY for hearing impaired- [phone]

## 2018-10-31 NOTE — MR AVS SNAPSHOT
After Visit Summary   10/31/2018    Gerard Manuel    MRN: [id number]           Patient Information     Date Of Birth          1935        Visit Information        Provider Department      10/31/2018 1:45 PM Hiren Serrano MD [facility] Dermatologic Surgery        Care Instructions    Mohs Cutaneous Micrographic Surgery Unit  Hiren Serrano MD      Pre-Surgical Instruction Sheet    1. Expect to be here majority of the morning.  The Mohs surgical procedure can be an extensive surgery requiring multiple stages. Each stage may take 1-2 hours.    ? You may eat breakfast  ? You may bring snacks or beverages with you  ? Take all normal medications morning of surgery -- unless otherwise indicated by your physician  ? If you have an artificial heart valve, or joint replacement within two years, we will prescribe an antibiotic. Please take one hour prior to surgery.    2. You will need a  to be with you if your surgical site is close to your eyes. You can get swelling/bruising immediately after surgery and will go home with a big bulky bandage that could obstruct your view of driving safely.    3. Wear comfortable clothing -- preferably a button down shirt or a loose fitted shirt. (to avoid pulling over pressure bandage off when you get home) If your surgery site is on your leg, try wearing loose fitted pants. If your surgery site is on your arm, try wearing a short-sleeve shirt.    4. Bring reading material or other items to help pass time. We do have internet access available if you own a laptop, iPad, etc.)    5. Women - do NOT wear make-up. We will be washing it off anyone needing surgery on the face    6. Do NOT stop blood thinning medication unless instructed to do so by your surgeon. This will include: Warfarin, Coumadin, Jantoven, Aspirin, Plavix and Pradaxa. If you are taking Warfarin or Coumadin, please have your INR blood test results sent to our office no more than 7 days prior to your surgery.      7. Tylenol is a good alternative to take for headaches and pain, and can be taken throughout your surgery and postoperative recovery.    8. If your surgery is on your trunk, arms, hands, legs, feet, fingernail or a toenail, please wash the area with Hibiclens, an over-the-counter antiseptic soap, the night before and morning of your surgery.     If you have any questions, please feel free to contact us.    Phone numbers:  During business hours (M-F 8:00-4:30 p.m.)  [facility]:  [phone] - select option 1 for appt. desk  [phone] - select option 3 for nurse triage line  [facility]:   [phone] - goes straight to call center   ---------------------------------------------------------  Evenings/Weekends/Holidays  Hospital - [phone]   TTY for hearing quncpydn-[phone]  *Ask  to page dermatologist on-call  Emergency Ieyf-[phone]  TTY for hearing impaired- [phone]                Follow-ups after your visit        Your next 10 appointments already scheduled     Nov 05, 2018  7:30 AM CST   (Arrive by 7:15 AM)   Return Mohs with Hiren Serrano MD   [facility] Dermatologic Surgery ([facility])    [address] [place] [id number] [phone]              Who to contact     Please call your clinic at [phone] to:    Ask questions about your health    Make or cancel appointments    Discuss your medicines    Learn about your test results    Speak to your doctor            Additional Information About Your Visit        [facility] Information     [employer] gives you secure access to your electronic health record. If you see a primary care provider, you can also send messages to your care team and make appointments. If you have questions, please call your primary care clinic.  If you do not have a primary care provider, please call [phone] and they will assist you.       [employer] is an electronic gateway that provides easy, online access to your medical records. With [employer], you can request a clinic appointment, read your test results, renew a prescription or communicate with your care team.     To access your existing account, please contact your [facility] or call [phone] for assistance.        Care EveryWhere ID     This is your Care EveryWhere ID. This could be used by other organizations to access your [place] medical records  [id number]         Blood Pressure from Last 3 Encounters:   07/23/18 130/77   07/09/18 113/69   10/07/15 113/69    Weight from Last 3 Encounters:   07/02/18 77.6 kg (171 lb)              Today, you had the following     No orders found for display       Primary Care Provider Office Phone # Fax #    Shelton Palomino [phone]       [facility] [address] [facility] [address]        Equal Access to Services     [facility] : Hadii arleth jennings hadasho Sobereali, waaxda luqadaha, qaybta kaalmada adeegyada, jazmyne manley . So [facility] [phone].    ATENCIÓN: Si habla español, tiene a clark disposición servicios gratuitos de asistencia lingüística. Elenita al [phone].    We comply with applicable federal civil rights laws and [place] laws. We do not discriminate on the basis of race, color, national origin, age, disability, sex, sexual orientation, or gender identity.            Thank you!     Thank you for choosing [facility] DERMATOLOGIC SURGERY  for your care. Our goal is always to provide you with excellent care. Hearing back from our patients is one way we can continue to improve our services. Please take a few minutes to complete the written survey that you may receive in the mail after your visit with us. Thank you!             Your Updated Medication List - Protect others around you: Learn how to safely use, store and throw away your medicines at  www.disposemymeds.org.          This list is accurate as of 10/31/18  1:57 PM.  Always use your most recent med list.                   Brand Name Dispense Instructions for use Diagnosis    aspirin 325 MG tablet      Take 81 mg by mouth daily        atenolol 25 MG tablet    TENORMIN     Take 50 mg by mouth daily        econazole nitrate 1 % cream     85 g    Apply thin layer to bottom of feet twice daily after washing the feet. Don't forget to apply between the toes.    Neoplasm of uncertain behavior of skin       ketorolac 0.5 % ophthalmic solution    ACULAR    1 Bottle    Place 1 drop Into the left eye 4 times daily    Nuclear senile cataract of left eye       ofloxacin 0.3 % ophthalmic solution    OCUFLOX    1 Bottle    Place 1 drop Into the left eye 4 times daily    Nuclear senile cataract of left eye       prednisoLONE acetate 1 % ophthalmic susp    PRED FORTE    1 Bottle    Place 1 drop Into the left eye 4 times daily    Nuclear senile cataract of left eye       rivaroxaban ANTICOAGULANT 20 MG Tabs tablet    XARELTO     Take 20 mg by mouth daily (with dinner)        simvastatin 40 MG tablet    ZOCOR     Take by mouth At Bedtime

## 2018-10-31 NOTE — LETTER
10/31/2018       RE: Gerard Manuel  [address] [place] [address]     Dear Colleague,    Thank you for referring your patient, Gerard Manuel, to the [place] [facility] at [facility]. Please see a copy of my visit note below.    [facility] Dermatology Note    Dermatology Surgery Clinic  [facility]  [address] [place] [address]    Dermatology Problem List:  1. H/o basal cell carcinoma on the nose   - s/p excision in 2015, requires annual skin checks  - Most recent TBSE 10/25/18  2. Seborrheic keratoses  3. SCC, Mohs scheduled for 11/5/2018  4. Tinea pedis, bilateral   Current tx: econazole 1% cream BID   5. Onychomycosis, bilateral   - We discussed oral medications and concluded that the risks likely outweigh the benefits given his age and medical hx   6. Senile purpura   - Pt will discuss vitamins with cardiologist including Vitamin C 500 mg BID and Rutoside 50 mg BID    Encounter Date: Oct 31, 2018    CC:  Chief Complaint   Patient presents with     Consult     Mr. Manuel is here today to have a consult for MOHS on the left dorsal hand for an SCC.          History of Present Illness:  Mr. Gerard Manuel is a 82 year old male with a history of BCC who presents today for a Mohs consultation regarding a squamous cell carcinoma on his left hand. This lesion was diagnosed based upon a biopsy from his previous visit with Dr. Stone on 10/25/2018. Today he states he has a pacemaker, and undergone Mohs surgery in 2015. Due to his previous experience with Mohs he had no questions about the procedure. Otherwise, the patient reports no painful, bleeding, nonhealing, or pruritic lesions, and denies new or changing moles.      Past Medical History:   Patient Active Problem List   Diagnosis     Blepharitis     BCC (basal cell carcinoma), face     Inflamed seborrheic keratosis     Tinea  pedis     History of basal cell carcinoma     Seborrheic keratoses, inflamed     Hypertrophic scar     Neoplasm of uncertain behavior of skin     Past Medical History:   Diagnosis Date     Arthritis      Atrial flutter (H)      Blepharitis      Cataracts, both eyes      Hypertension     on meds     MGD (meibomian gland dysfunction)      Pacemaker      Past Surgical History:   Procedure Laterality Date     APPENDECTOMY       CARDIAC SURGERY      CABGx4     CATARACT IOL, RT/LT Left 07/09/2018     CATARACT IOL, RT/LT Right 07/23/2018     MOHS MICROGRAPHIC PROCEDURE       PHACOEMULSIFICATION CLEAR CORNEA WITH STANDARD INTRAOCULAR LENS IMPLANT Left 7/9/2018    Procedure: PHACOEMULSIFICATION CLEAR CORNEA WITH STANDARD INTRAOCULAR LENS IMPLANT;  LEFT EYE PHACOEMULSIFICATION CLEAR CORNEA WITH STANDARD INTRAOCULAR LENS IMPLANT ;  Surgeon: Gerardo Peña MD;  [facility]     PHACOEMULSIFICATION CLEAR CORNEA WITH STANDARD INTRAOCULAR LENS IMPLANT Right 7/23/2018    Procedure: PHACOEMULSIFICATION CLEAR CORNEA WITH STANDARD INTRAOCULAR LENS IMPLANT;  RIGHT EYE PHACOEMULSIFICATION CLEAR CORNEA WITH STANDARD INTRAOCULAR LENS IMPLANT ;  Surgeon: Gerardo Peña MD;  Location:  [facility]     Quadruple Bypass Surgery  1992       Social History:  Social History     Social History     Marital status:      Spouse name: N/A     Number of children: N/A     Years of education: N/A     Social History Main Topics     Smoking status: Never Smoker     Smokeless tobacco: Never Used     Alcohol use No     Drug use: No     Sexual activity: Not Asked     Other Topics Concern     None     Social History Narrative       Family History:  Family History   Problem Relation Age of Onset     Cancer Brother      skin cancer     Skin Cancer Brother      Skin Cancer Sister      Glaucoma No family hx of      Macular Degeneration No family hx of         Medications:  Current Outpatient Prescriptions   Medication Sig Dispense Refill      aspirin 325 MG tablet Take 81 mg by mouth daily       atenolol (TENORMIN) 25 MG tablet Take 50 mg by mouth daily       econazole nitrate 1 % cream Apply thin layer to bottom of feet twice daily after washing the feet. Don't forget to apply between the toes. 85 g 11     rivaroxaban ANTICOAGULANT (XARELTO) 20 MG TABS tablet Take 20 mg by mouth daily (with dinner)       simvastatin (ZOCOR) 40 MG tablet Take by mouth At Bedtime       ketorolac (ACULAR) 0.5 % ophthalmic solution Place 1 drop Into the left eye 4 times daily (Patient not taking: Reported on 8/21/2018) 1 Bottle 0     ofloxacin (OCUFLOX) 0.3 % ophthalmic solution Place 1 drop Into the left eye 4 times daily (Patient not taking: Reported on 8/21/2018) 1 Bottle 0     prednisoLONE acetate (PRED FORTE) 1 % ophthalmic susp Place 1 drop Into the left eye 4 times daily (Patient not taking: Reported on 8/21/2018) 1 Bottle 0       No Known Allergies    Review of Systems:  -Skin Establ Pt: The patient denies any new rash, pruritus, or lesions that are symptomatic, changing or bleeding, except as per HPI.  -Constitutional: The patient is feeling generally well.    Physical exam:  Vitals: There were no vitals taken for this visit.  GEN: This is a well developed, well-nourished male in no acute distress, in a pleasant mood.    SKIN: Focused examination of the hands was performed.  - 1.5 cm ulcerated karitotic papule, L dorsal 1st interdigital space  - No other lesions of concern on areas examined.   - no popliteal LAD    Impression/Plan:  1. Squamous cell carcinoma of the L dorsal 1st interdigital space. Mohs surgery recommended.    Discussed nature of SCC with patient.     Risks, benefits, and process of Mohs micrographic surgery were discussed including possibility of damage to surrounding anatomical structures and infection. Patient is comfortable proceeding with the surgery; consent form was obtained. He will be scheduled at his convenience.      Follow-up as  scheduled for [facility].     Staff Involved:    Scribe Disclosure  I, Jr Watson, am serving as a scribe to document services personally performed by Dr. Hiren Serrano, based on data collection and the provider's statements to me.     Attending Attestation  I attest that the Scribe recorded the interview and exam that I personally performed.  I have reviewed the note and edited it as necessary.    Hiren Serrano M.D.    Director of Dermatologic Surgery  Department of Dermatology  [facility]

## 2018-10-31 NOTE — TELEPHONE ENCOUNTER
History of Skin cancer : Yes    History of Mohs Surgery: yes    History of a solid organ transplant: no Organ(s):  Year(s):  Creatinine:  Bone Marrow Transplant : no (year, )    Immunosuppressive Medications: no (if yes, which ones: )    HIV or Hepatitis B or C : no    Chronic lymphocytic leukemia: no    Diabetes: no (Type 1 or 2: )    Any Bleeding disorders: no    Do you have a Pacemaker or Defibrillator: Pacemaker (year placed: replaced on 11/2017)    Artificial heart valve: no (mechanical or porcine: )    Joint Replacement in the last 2 years: no Joint(s):  Year(s):     Do you typically take Prophylactic Antibiotics before seeing a dentist or having a procedure?: no    If yes, verify that patient has the antibiotic on hand and instruct to take one hour before their surgery appointment.    If they do not have the antibiotic, verify the patients pharmacy and notify surgeon by printing the pre-mohs call sheet.    Do you wear a C Pap Mask: no    If yes, and procedure is on the face, ask patient to bring in the mask with them (Mask only)    Do you have any mobility issues: no    If yes, is a van service is required to transport you to/from your appointment? no    Smoking History (tobacco of any sort): no    Do you have any other health issues that we should know about? no    Do you take any of the following Blood Thinners:    Aspirin: 81mg ASA QD    Plavix/Aggrastat/Brilinta: no    Warfarin: no (Last INR:  Date: )    Pradaxa/Eliquis/Xarelto: yes Xarelto    Ibuprofen (Advil/Motrin): no    Naproxen (Aleve): no    Vitamin E: no    Fish Oil: no    Ginkgo biloba: no    Other:    Done-Paient was instructed of the following: Ibuprofen, naproxen, Vitamin E, Fish Oil, and Ginkgo biloba should be stopped 1 week prior to and 1 week after the procedure.    Medication Allergies: in EHR    Are medications in Epic: in EHR    Done-Patient was reminded to take all medications as usual, other than those listed above, on the day of  surgery    Done-Patient was instructed to bring all medications to clinic on day of surgery    Done-Patient will bring a     (A  is helpful for the following reasons: some patients need medications to relax, but once given, patients should not drive; sometimes bandages obstruct your vision making it difficult to see and unsafe to drive; the day can get long so it s nice to have a jaylene; sometimes the procedure wears people out/makes them quite tired)    Done-Photograph of the surgical site(s) is/are available    DonePatient was reminded that this can be an all-day procedure and that no other appointments should be scheduled on the day of Mohs

## 2018-10-31 NOTE — NURSING NOTE
Chief Complaint   Patient presents with     Consult     Mr. Manuel is here today to have a consult for MOHS on the left dorsal hand for an SCC.      Francheska Thornton CMA

## 2018-10-31 NOTE — PROGRESS NOTES
[facility] Dermatology Note    Dermatology Surgery Clinic  [facility]  [address] [place] [id number]    Dermatology Problem List:  1. H/o basal cell carcinoma on the nose   - s/p excision in 2015, requires annual skin checks  - Most recent TBSE 10/25/18  2. Seborrheic keratoses  3. SCC, Mohs scheduled for 11/5/2018  4. Tinea pedis, bilateral   Current tx: econazole 1% cream BID   5. Onychomycosis, bilateral   - We discussed oral medications and concluded that the risks likely outweigh the benefits given his age and medical hx   6. Senile purpura   - Pt will discuss vitamins with cardiologist including Vitamin C 500 mg BID and Rutoside 50 mg BID    Encounter Date: Oct 31, 2018    CC:  Chief Complaint   Patient presents with     Consult     Mr. Manuel is here today to have a consult for MOHS on the left dorsal hand for an SCC.          History of Present Illness:  Mr. Gerard Manuel is a 82 year old male with a history of BCC who presents today for a Mohs consultation regarding a squamous cell carcinoma on his left hand. This lesion was diagnosed based upon a biopsy from his previous visit with Dr. Stone on 10/25/2018. Today he states he has a pacemaker, and undergone Mohs surgery in 2015. Due to his previous experience with Mohs he had no questions about the procedure. Otherwise, the patient reports no painful, bleeding, nonhealing, or pruritic lesions, and denies new or changing moles.      Past Medical History:   Patient Active Problem List   Diagnosis     Blepharitis     BCC (basal cell carcinoma), face     Inflamed seborrheic keratosis     Tinea pedis     History of basal cell carcinoma     Seborrheic keratoses, inflamed     Hypertrophic scar     Neoplasm of uncertain behavior of skin     Past Medical History:   Diagnosis Date     Arthritis      Atrial flutter (H)      Blepharitis      Cataracts, both eyes      Hypertension     on  meds     MGD (meibomian gland dysfunction)      Pacemaker      Past Surgical History:   Procedure Laterality Date     APPENDECTOMY       CARDIAC SURGERY      CABGx4     CATARACT IOL, RT/LT Left 07/09/2018     CATARACT IOL, RT/LT Right 07/23/2018     MOHS MICROGRAPHIC PROCEDURE       PHACOEMULSIFICATION CLEAR CORNEA WITH STANDARD INTRAOCULAR LENS IMPLANT Left 7/9/2018    Procedure: PHACOEMULSIFICATION CLEAR CORNEA WITH STANDARD INTRAOCULAR LENS IMPLANT;  LEFT EYE PHACOEMULSIFICATION CLEAR CORNEA WITH STANDARD INTRAOCULAR LENS IMPLANT ;  Surgeon: Gerardo Peña MD;  [facility]:  [facility]     PHACOEMULSIFICATION CLEAR CORNEA WITH STANDARD INTRAOCULAR LENS IMPLANT Right 7/23/2018    Procedure: PHACOEMULSIFICATION CLEAR CORNEA WITH STANDARD INTRAOCULAR LENS IMPLANT;  RIGHT EYE PHACOEMULSIFICATION CLEAR CORNEA WITH STANDARD INTRAOCULAR LENS IMPLANT ;  Surgeon: Gerardo Peña MD;  Location:  [facility]     Quadruple Bypass Surgery  1992       Social History:  Social History     Social History     Marital status:      Spouse name: N/A     Number of children: N/A     Years of education: N/A     Social History Main Topics     Smoking status: Never Smoker     Smokeless tobacco: Never Used     Alcohol use No     Drug use: No     Sexual activity: Not Asked     Other Topics Concern     None     Social History Narrative       Family History:  Family History   Problem Relation Age of Onset     Cancer Brother      skin cancer     Skin Cancer Brother      Skin Cancer Sister      Glaucoma No family hx of      Macular Degeneration No family hx of         Medications:  Current Outpatient Prescriptions   Medication Sig Dispense Refill     aspirin 325 MG tablet Take 81 mg by mouth daily       atenolol (TENORMIN) 25 MG tablet Take 50 mg by mouth daily       econazole nitrate 1 % cream Apply thin layer to bottom of feet twice daily after washing the feet. Don't forget to apply between the toes. 85 g 11     rivaroxaban ANTICOAGULANT  (XARELTO) 20 MG TABS tablet Take 20 mg by mouth daily (with dinner)       simvastatin (ZOCOR) 40 MG tablet Take by mouth At Bedtime       ketorolac (ACULAR) 0.5 % ophthalmic solution Place 1 drop Into the left eye 4 times daily (Patient not taking: Reported on 8/21/2018) 1 Bottle 0     ofloxacin (OCUFLOX) 0.3 % ophthalmic solution Place 1 drop Into the left eye 4 times daily (Patient not taking: Reported on 8/21/2018) 1 Bottle 0     prednisoLONE acetate (PRED FORTE) 1 % ophthalmic susp Place 1 drop Into the left eye 4 times daily (Patient not taking: Reported on 8/21/2018) 1 Bottle 0       No Known Allergies    Review of Systems:  -Skin Establ Pt: The patient denies any new rash, pruritus, or lesions that are symptomatic, changing or bleeding, except as per HPI.  -Constitutional: The patient is feeling generally well.    Physical exam:  Vitals: There were no vitals taken for this visit.  GEN: This is a well developed, well-nourished male in no acute distress, in a pleasant mood.    SKIN: Focused examination of the hands was performed.  - 1.5 cm ulcerated karitotic papule, L dorsal 1st interdigital space  - No other lesions of concern on areas examined.   - no popliteal LAD    Impression/Plan:  1. Squamous cell carcinoma of the L dorsal 1st interdigital space. Mohs surgery recommended.    Discussed nature of SCC with patient.     Risks, benefits, and process of Mohs micrographic surgery were discussed including possibility of damage to surrounding anatomical structures and infection. Patient is comfortable proceeding with the surgery; consent form was obtained. He will be scheduled at his convenience.      Follow-up as scheduled for [facility].     Staff Involved:    Scribe Disclosure  I, Jr Watson, am serving as a scribe to document services personally performed by Dr. Hiren Serrano, based on data collection and the provider's statements to me.     Attending Attestation  I attest that the Scribe recorded the  interview and exam that I personally performed.  I have reviewed the note and edited it as necessary.    Hiren Serrano M.D.    Director of Dermatologic Surgery  Department of Dermatology  [facility]

## 2018-11-05 ENCOUNTER — OFFICE VISIT (OUTPATIENT)
Dept: DERMATOLOGY | Facility: CLINIC | Age: 83
End: 2018-11-05
Payer: MEDICARE

## 2018-11-05 VITALS — SYSTOLIC BLOOD PRESSURE: 136 MMHG | DIASTOLIC BLOOD PRESSURE: 88 MMHG | HEART RATE: 93 BPM

## 2018-11-05 DIAGNOSIS — C44.629 SQUAMOUS CELL CARCINOMA OF DORSUM OF LEFT HAND: Primary | ICD-10-CM

## 2018-11-05 ASSESSMENT — PAIN SCALES - GENERAL
PAINLEVEL: NO PAIN (0)
PAINLEVEL: MILD PAIN (2)

## 2018-11-05 NOTE — NURSING NOTE
1st layer performed on the L dorsal hand. Injected 2.5 ml of Xylocaine  (Lidocaine 1% and Epinephrine  (1:100,000))      Present for procedure:  Dr. Zach Clay, EMT

## 2018-11-05 NOTE — NURSING NOTE
Closure performed on the left dorsal hand. Injected 6.0ml of Xylocaine  (Lidocaine 1% and Epinephrine  (1:100,000))    Defect Photo: LC  Closure Photo: SK    Present for procedure:   Dr. Zach Greenfield, Resident MD Jaylin Celeste, LPN    Vaseline and telfa applied. Pressure dressing applied with dental rolls and tape and then wrapped with coban. No bleeding noted. Denies pain.  Wound care instructions reviewed. Supplies given. All questions answered.

## 2018-11-05 NOTE — NURSING NOTE
Chief Complaint   Patient presents with     Derm Problem     [id number] L dorsal hand SCC     Cristina Clay, EMT

## 2018-11-05 NOTE — PROGRESS NOTES
History of Skin cancer : yes    History of Mohs Surgery: yes    History of a solid organ transplant: no Organ(s): no Year(s): no Creatinine: no Bone Marrow Transplant : no (year, no)    Immunosuppressive Medications: no     HIV or Hepatitis B or C : no    Chronic lymphocytic leukemia: no    Diabetes: no     Any Bleeding disorders: no    Do you have a Pacemaker or Defibrillator: pacemaker     Artificial heart valve: no     Joint Replacement in the last 2 years: no     Do you typically take Prophylactic Antibiotics before seeing a dentist or having a procedure?: no    If yes, verify that patient has the antibiotic on hand and instruct to take one hour before their surgery appointment.    If they do not have the antibiotic, verify the patients pharmacy and notify surgeon by printing the pre-mohs call sheet.    Do you wear a C Pap Mask: no    If yes, and procedure is on the face, ask patient to bring in the mask with them (Mask only)    Do you have any mobility issues: no    If yes, is a van service is required to transport you to/from your appointment? no    Smoking History (tobacco of any sort): no    Do you have any other health issues that we should know about? no    Do you take any of the following Blood Thinners:    Aspirin: no    Plavix/Aggrastat/Brilinta: no    Warfarin: no     Pradaxa/Eliquis/Xarelto: Xarelto    Ibuprofen (Advil/Motrin): no    Naproxen (Aleve): no    Vitamin E: no    Fish Oil: no    Ginkgo biloba: on    Other:no    Yes Patient was instructed of the following: Ibuprofen, naproxen, Vitamin E, Fish Oil, and Ginkgo biloba should be stopped 1 week prior to and 1 week after the procedure.    Medication Allergies: no    Are medications in Epic: yes    Yes Patient was reminded to take all medications as usual, other than those listed above, on the day of surgery    Yes Patient was instructed to bring all medications to clinic on day of surgery    Yes Patient will bring a     (A  is helpful  for the following reasons: some patients need medications to relax, but once given, patients should not drive; sometimes bandages obstruct your vision making it difficult to see and unsafe to drive; the day can get long so it s nice to have a jaylene; sometimes the procedure wears people out/makes them quite tired)    Yes Photograph of the surgical site(s) is/are available    Yes Patient was reminded that this can be an all-day procedure and that no other appointments should be scheduled on the day of Mohs

## 2018-11-05 NOTE — LETTER
11/5/2018       RE: Gerard Manuel  [address] [place] [address]     Dear Colleague,    Thank you for referring your patient, Gerard Manuel, to the [facility] DERMATOLOGIC SURGERY at [facility]. Please see a copy of my visit note below.    History of Skin cancer : yes    History of Mohs Surgery: yes    History of a solid organ transplant: no Organ(s): no Year(s): no Creatinine: no Bone Marrow Transplant : no (year, no)    Immunosuppressive Medications: no     HIV or Hepatitis B or C : no    Chronic lymphocytic leukemia: no    Diabetes: no     Any Bleeding disorders: no    Do you have a Pacemaker or Defibrillator: pacemaker     Artificial heart valve: no     Joint Replacement in the last 2 years: no     Do you typically take Prophylactic Antibiotics before seeing a dentist or having a procedure?: no    If yes, verify that patient has the antibiotic on hand and instruct to take one hour before their surgery appointment.    If they do not have the antibiotic, verify the patients pharmacy and notify surgeon by printing the pre-mohs call sheet.    Do you wear a C Pap Mask: no    If yes, and procedure is on the face, ask patient to bring in the mask with them (Mask only)    Do you have any mobility issues: no    If yes, is a van service is required to transport you to/from your appointment? no    Smoking History (tobacco of any sort): no    Do you have any other health issues that we should know about? no    Do you take any of the following Blood Thinners:    Aspirin: no    Plavix/Aggrastat/Brilinta: no    Warfarin: no     Pradaxa/Eliquis/Xarelto: Xarelto    Ibuprofen (Advil/Motrin): no    Naproxen (Aleve): no    Vitamin E: no    Fish Oil: no    Ginkgo biloba: on    Other:no    Yes Patient was instructed of the following: Ibuprofen, naproxen, Vitamin E, Fish Oil, and Ginkgo biloba should be stopped 1 week prior to and 1 week after the procedure.    Medication  Allergies: no    Are medications in Epic: yes    Yes Patient was reminded to take all medications as usual, other than those listed above, on the day of surgery    Yes Patient was instructed to bring all medications to clinic on day of surgery    Yes Patient will bring a     (A  is helpful for the following reasons: some patients need medications to relax, but once given, patients should not drive; sometimes bandages obstruct your vision making it difficult to see and unsafe to drive; the day can get long so it s nice to have a jaylene; sometimes the procedure wears people out/makes them quite tired)    Yes Photograph of the surgical site(s) is/are available    Yes Patient was reminded that this can be an all-day procedure and that no other appointments should be scheduled on the day of Mohs        MOHS MICROGRAPHIC SURGERY REPORT   November 5, 2018    Dermatology Surgery Clinic  [facility]  [address] [place], [place] [id number]    Surgeon:  Hiren Serrano MD  Resident:  Dr. 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  The area was cleansed with chlorhexidine and infiltrated with 1% Lidocaine and epinephrine. Tumor was debulked. Using a #15-blade, complete excision was made around the tumor in 1 section.  Hemostasis was obtained by electrodesiccation.  A dressing was placed. Tissue was divided into 1 tissue block that was subsequently mapped, color coded and processed in the Mohs Laboratory.  Microscopic tumor was not  found in tissue block.    With the lesion clear of micrographic tumor, surgery was considered complete.  The defect extended to the fat and measured 2.5 x 1.8 cm.    RECONSTRUCTIVE DERMATOLOGIC SURGERY REPORT  We discussed the options for wound management in full with the patient including risks/benefits/possible outcomes.     Intermediate Layered Linear Closure:  Patient was prepped with chlorhexidine, local anesthesia administered, and draped in a sterile fashion. After hemostasis was obtained, the wound was closed in a layered fashion with final length measuring 3.0 with 4-0 Vicryl and 5-0 fast absorbing gut sutures.     Estimated blood loss, minimal; complications, none; bandaging and wound care, routine. Patient was given written and verbal wound care procedures prior to discharge. Patient was discharged in good condition and will return for   Wound evaluation as needed.    Staff Involved:    Scribe Disclosure  I, Jr Watson, am serving as a scribe to document services personally performed by Dr. Hiren Serrano, based on data collection and the provider's statements to me.     Attending attestation:  I personally performed the entire procedure.  I have reviewed the note and edited it as necessary, and agree with its contents.    Hiren Serrano M.D.    Director of Dermatologic Surgery  Department of Dermatology  [facility]    Dermatology Surgery Clinic  [facility] [address] [address]

## 2018-11-05 NOTE — PROGRESS NOTES
MOHS MICROGRAPHIC SURGERY REPORT   November 5, 2018    Dermatology Surgery Clinic  [facility]  [address] [place] [id number]    Surgeon:  Hiren Serrano MD  Resident:  Dr. 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  The area was cleansed with chlorhexidine and infiltrated with 1% Lidocaine and epinephrine. Tumor was debulked. Using a #15-blade, complete excision was made around the tumor in 1 section.  Hemostasis was obtained by electrodesiccation.  A dressing was placed. Tissue was divided into 1 tissue block that was subsequently mapped, color coded and processed in the Mohs Laboratory.  Microscopic tumor was not found in tissue block.    With the lesion clear of micrographic tumor, surgery was considered complete.  The defect extended to the fat and measured 2.5 x 1.8 cm.    RECONSTRUCTIVE DERMATOLOGIC SURGERY REPORT  We discussed the options for wound management in full with the patient including risks/benefits/possible outcomes.     Intermediate Layered Linear Closure:  Patient was prepped with chlorhexidine, local anesthesia administered, and draped in a sterile fashion. After hemostasis was obtained, the wound was closed in a layered fashion with final length measuring 3.0 with 4-0 Vicryl and 5-0 fast absorbing gut sutures.     Estimated blood loss, minimal; complications, none; bandaging and wound care, routine. Patient was given written and verbal wound care procedures prior to discharge. Patient was discharged  in good condition and will return for   Wound evaluation as needed.    Staff Involved:    Scribe Disclosure  I, Jr Watson, am serving as a scribe to document services personally performed by Dr. Hiren Serrano, based on data collection and the provider's statements to me.     Attending attestation:  I personally performed the entire procedure.  I have reviewed the note and edited it as necessary, and agree with its contents.    Hiren Serrano M.D.    Director of Dermatologic Surgery  Department of Dermatology  [facility]    Dermatology Surgery Clinic  [facility]  [address] [place], [place] [id number]

## 2018-11-05 NOTE — PATIENT INSTRUCTIONS
For the First 48 hours After Surgery:  1. Leave the pressure bandage on and keep it dry. If it should come loose, you may retape it, but do not take it off.  2. Relax and take it easy. Do not do any vigorous exercise, heavy lifting, or bending forward. This could cause the wound to bleed.  3. Post-operative pain is usually mild. You may take plain or extra strength Tylenol every 4 hours as needed (do not take more than 4,000mg in one day). Do not take any medicine that contains aspirin, ibuprofen or motrin unless you have been recommended these by a doctor.  Avoid alcohol and vitamin E as these may increase your tendency to bleed.  4. You may put an ice pack around the bandaged area for 20 minutes every 2-3 hours. This may help reduce swelling, bruising, and pain. Make sure the ice pack is waterproof so that the pressure bandage does not get wet.   5. You may see a small amount of drainage or blood on your pressure bandage. This is normal. However, if drainage or bleeding continues or saturates the bandage, you will need to apply firm pressure over the bandage with a washcloth for 15 minutes. If bleeding continues after applying pressure for 15 minutes then go to the nearest emergency room.  48 Hours After Surgery  Carefully remove the bandage and start daily wound care and dressing changes. You may also now shower and get the wound wet. Wash wound with a mild soap and water.  Use caution when washing the wound. Be gentle and do not let the forceful shower stream hit the wound directly.  PAT dry.  Daily Wound Care:  1. Wash wound with a mild soap and water.  Use caution when washing the wound, be gentle and do not let the forceful shower stream hit the wound directly.  2. PAT DRY.  3. Apply Vaseline (from a new container or tube) over the suture line with a Q-tip. It is very important to keep the wound continuously moist, as wounds heal best in a moist environment.  4.  Keep the site covered until sutures are  removed, you can cover it with a Telfa (non-stick) dressing and tape or a band-aid.    5. If you are unable to keep wound covered, you must apply Vaseline every 2 - 3 hours (while awake) to ensure it is being kept moist for optimal healing. A dressing overnight is recommended to keep the area moist.   Call Us If:  1. You have pain that is not controlled with Tylenol.  2. You have signs or symptoms of an infection, such as: fever over 100 degrees F, redness, warmth, or foul-smelling or yellow/creamy drainage from the wound.  Who should I call with questions?    [facility]: [phone]     [facility]: [phone]    For urgent needs outside of business hours call the [facility] at [phone] and ask for the dermatology resident on call

## 2018-11-06 ENCOUNTER — TELEPHONE (OUTPATIENT)
Dept: DERMATOLOGY | Facility: CLINIC | Age: 83
End: 2018-11-06

## 2018-11-06 NOTE — TELEPHONE ENCOUNTER
Follow up call completed following Mohs procedure with Dr. Sloan.     The following questions were asked:    What are you doing to manage your pain? Tylenol  Is it helping? yes  Are you applying ice?  no  Have you had any noticeable bleeding through the bandage? no   Do you have any concerns? None at this time.          Please call [phone] option [phone] if you have any additional questions.

## 2018-11-15 ENCOUNTER — TELEPHONE (OUTPATIENT)
Dept: DERMATOLOGY | Facility: CLINIC | Age: 83
End: 2018-11-15

## 2018-11-15 NOTE — TELEPHONE ENCOUNTER
I called the patient back. He denies any pain, fever or chills. He states that the site is just red. I asked that he send a photo using [email or web address]. The patient will try to send a photo if he is unable to send a photo he will call us back for a quick nursing visit.  Francheska Thornton, [facility]

## 2018-11-15 NOTE — TELEPHONE ENCOUNTER
"Patient was unable to upload photos. After discussing symptoms, patient will monitor and contact clinic with worsening symptoms.     When asked how far redness is out from excision site patient stated the \"whole width is maybe 1 cm\" He will monitor if redness continues to spread or if he develops pain, drainage, warmth or fever. He is not interested in having an appt tomorrow. Has no further questions at this time.  "

## 2018-11-15 NOTE — TELEPHONE ENCOUNTER
[facility] Call Center    Phone Message    May a detailed message be left on voicemail: yes    Reason for Call: Other: per pt- had a recent MOH'S procedure and is experiencing some redness along his left hand stitches - please call pt back, thanks     Action Taken: Message routed to:  [facility] ([facility] derm

## 2018-12-04 ENCOUNTER — TELEPHONE (OUTPATIENT)
Dept: CALL CENTER | Age: 83
End: 2018-12-04

## 2018-12-04 NOTE — TELEPHONE ENCOUNTER
[facility]: Nurse Triage Note  SITUATION/BACKGROUND                                                      Gerard Manuel is a 83 year old male who is calling Dr. Serrano in Derm.  He is post Mohs procedure 11/5 on his left hand.  He reports redness that is not getting worse, but not improving either.  Would like to be seen.    Description:  He reports he has a 2 inch long incision on his left hand. One end of the incision is hardly red.  But the part closest to his finger is red about 1/2 inch across.  Denies drainage.  Denies temperature change in the skin.  Just uncomfortable  Onset/duration:  Since procedure,  Just not improving  Precip. factors:  Mohs procedure 11/5  Associated symptoms:  Very slight itching.  Improves/worsens symptoms:  He's keeping it covered and applying Vaseline.  Pain scale (0-10)   1-2/10    MEDICATIONS:   Taking medication(s) as prescribed? Yes  Taking over the counter medication(s?) Yes  Any barriers to taking medication(s) as prescribed?  No  Medication(s) improving/managing symptoms?  No    Allergies: No Known Allergies    ASSESSMENT      Redness around procedure site. Needs assessment.  I called Derm clinic spoke with Juwan, transferred Gerard to her.  She will get appointment so it can be evaluated.    RECOMMENDATION/PLAN                                                      RECOMMENDED DISPOSITION:  Be seen, not emergent.  Appointment made for tomorrow.  Will comply with recommendation: Yes    If further questions/concerns or if symptoms do not improve, worsen or new symptoms develop, call your PCP or [phone] to talk with the Resident on call, as soon as possible.    Guideline used: Wound care page 624  Telephone Triage Protocols for Nurses, Fifth Edition, Laura Laureano RN

## 2018-12-05 ENCOUNTER — ALLIED HEALTH/NURSE VISIT (OUTPATIENT)
Dept: DERMATOLOGY | Facility: CLINIC | Age: 83
End: 2018-12-05
Payer: MEDICARE

## 2018-12-05 DIAGNOSIS — C44.629 SQUAMOUS CELL CARCINOMA OF DORSUM OF LEFT HAND: Primary | ICD-10-CM

## 2018-12-05 ASSESSMENT — PAIN SCALES - GENERAL: PAINLEVEL: NO PAIN (0)

## 2018-12-05 NOTE — NURSING NOTE
Chief Complaint   Patient presents with     RECHECK     Gerard is here today for a follow up 30 days after MOHS. He states that has hes been keeping it covered most of the time.      Francheska Thornton, CMA

## 2018-12-05 NOTE — MR AVS SNAPSHOT
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    -  1       Follow-ups after your visit        Your next 10 appointments already scheduled     Feb 06, 2019  1:00 PM CST   Nurse Visit with Lu Derm Surg Nurse   [facility] Dermatologic Surgery ([facility] [place])    [address] [place] [address] [phone]              Who to contact     Please call your clinic at [phone] to:    Ask questions about your health    Make or cancel appointments    Discuss your medicines    Learn about your test results    Speak to your doctor            Additional Information About Your Visit        MyChart Information     [employer] gives you secure access to your electronic health record. If you see a primary care provider, you can also send messages to your care team and make appointments. If you have questions, please call your primary care clinic.  If you do not have a primary care provider, please call [phone] and they will assist you.      [employer] is an electronic gateway that provides easy, online access to your medical records. With [employer], you can request a clinic appointment, read your test results, renew a prescription or communicate with your care team.     To access your existing account, please contact your [facility] or call [phone] for assistance.        Care EveryWhere ID     This is your Care EveryWhere ID. This could be used by other organizations to access your [place] medical records  [id number]         Blood Pressure from Last 3 Encounters:   11/05/18 136/88   07/23/18 130/77   07/09/18 113/69    Weight from Last 3  Encounters:   07/02/18 77.6 kg (171 lb)              Today, you had the following     No orders found for display       Primary Care Provider Office Phone # Fax #    Shelton Palomino [phone]       [facility] [address]   [facility] [address]        Equal Access to Services     JOYA RED : Hadii aad ku hadasho Soomaali, waaxda luqadaha, qaybta kaalmada adeegyada, waxay monserratin haysonidon alexa evansbernarda lamario owusu. So [facility] [phone].    ATENCIÓN: Si habla español, tiene a clark disposición servicios gratuitos de asistencia lingüística. Elenita al [phone].    We comply with applicable federal civil rights laws and [place] laws. We do not discriminate on the basis of race, color, national origin, age, disability, sex, sexual orientation, or gender identity.            Thank you!     Thank you for choosing [facility] DERMATOLOGIC SURGERY  for your care. Our goal is always to provide you with excellent care. Hearing back from our patients is one way we can continue to improve our services. Please take a few minutes to complete the written survey that you may receive in the mail after your visit with us. Thank you!             Your Updated Medication List - Protect others around you: Learn how to safely use, store and throw away your medicines at www.disposemymeds.org.          This list is accurate as of 12/5/18  1:59 PM.  Always use your most recent med list.                   Brand Name Dispense Instructions for use Diagnosis    aspirin 325 MG tablet    ASA     Take 81 mg by mouth daily        atenolol 25 MG tablet    TENORMIN     Take 50 mg by mouth daily        econazole nitrate 1 % external cream     85 g    Apply thin layer to bottom of feet twice daily after washing the feet. Don't forget to apply between the toes.    Neoplasm of uncertain behavior of skin       ketorolac 0.5 % ophthalmic solution    ACULAR    1 Bottle    Place 1 drop Into the left eye 4 times daily    Nuclear senile  cataract of left eye       ofloxacin 0.3 % ophthalmic solution    OCUFLOX    1 Bottle    Place 1 drop Into the left eye 4 times daily    Nuclear senile cataract of left eye       prednisoLONE acetate 1 % ophthalmic suspension    PRED FORTE    1 Bottle    Place 1 drop Into the left eye 4 times daily    Nuclear senile cataract of left eye       rivaroxaban ANTICOAGULANT 20 MG Tabs tablet    XARELTO     Take 20 mg by mouth daily (with dinner)        simvastatin 40 MG tablet    ZOCOR     Take by mouth At Bedtime

## 2018-12-05 NOTE — NURSING NOTE
The wound was checked by the RN, he does not need to continue wound care as the wound is closed. The patient understood.   Francheska Thornton CMA

## 2019-01-09 ENCOUNTER — TELEPHONE (OUTPATIENT)
Dept: OPHTHALMOLOGY | Facility: CLINIC | Age: 84
End: 2019-01-09

## 2019-01-09 NOTE — TELEPHONE ENCOUNTER
[facility] Call Center    Phone Message    May a detailed message be left on voicemail: yes    Reason for Call: Other: per pt- needs to schedule a 6 month post op w/ Page around march- please call pt back thanks     Action Taken: Message routed to:  [facility] ([facility] eye clinic

## 2019-01-11 NOTE — TELEPHONE ENCOUNTER
Pt out of town in February and requesting 6 month f/u cataract surgery in march  Scheduled march 5th with dr. Peña  Pt satisfied with date/time  Per Wilson RN 4:10 PM 01/11/19

## 2019-01-11 NOTE — TELEPHONE ENCOUNTER
Pt called to Follow-up.  It looks as though the encounter went to an address that may not be working.  I asked Per to take a look and get back to us.  I'd like to verify the correct addressing but also get the Pt back on track to scheduling his POST-OP follow up with the clinic schedulers.  Thanks for checking this.    Pt will be expecting a call at home from schedulers at our soonest convenience.  Told him might be Monday

## 2019-03-05 ENCOUNTER — OFFICE VISIT (OUTPATIENT)
Dept: OPHTHALMOLOGY | Facility: CLINIC | Age: 84
End: 2019-03-05
Attending: OPHTHALMOLOGY
Payer: MEDICARE

## 2019-03-05 DIAGNOSIS — H43.813 POSTERIOR VITREOUS DETACHMENT OF BOTH EYES: ICD-10-CM

## 2019-03-05 DIAGNOSIS — H01.003 BLEPHARITIS OF BOTH EYES, UNSPECIFIED EYELID, UNSPECIFIED TYPE: ICD-10-CM

## 2019-03-05 DIAGNOSIS — Z96.1 PSEUDOPHAKIA: Primary | ICD-10-CM

## 2019-03-05 DIAGNOSIS — H01.006 BLEPHARITIS OF BOTH EYES, UNSPECIFIED EYELID, UNSPECIFIED TYPE: ICD-10-CM

## 2019-03-05 PROCEDURE — G0463 HOSPITAL OUTPT CLINIC VISIT: HCPCS | Mod: ZF

## 2019-03-05 ASSESSMENT — REFRACTION_WEARINGRX
OD_AXIS: 115
OD_ADD: +2.75
OS_AXIS: 080
OD_SPHERE: -0.50
OS_SPHERE: -0.25
OS_CYLINDER: +0.75
OD_CYLINDER: +1.00
OS_ADD: +2.75

## 2019-03-05 ASSESSMENT — VISUAL ACUITY
OD_CC: 20/20
OD_CC+: -1
METHOD: SNELLEN - LINEAR
CORRECTION_TYPE: GLASSES
OS_CC: 20/20

## 2019-03-05 ASSESSMENT — CUP TO DISC RATIO
OD_RATIO: 0.25
OS_RATIO: 0.3

## 2019-03-05 ASSESSMENT — TONOMETRY
OS_IOP_MMHG: 14
IOP_METHOD: TONOPEN
OD_IOP_MMHG: 14

## 2019-03-05 ASSESSMENT — CONF VISUAL FIELD
METHOD: COUNTING FINGERS
OD_NORMAL: 1
OS_NORMAL: 1

## 2019-03-05 ASSESSMENT — EXTERNAL EXAM - LEFT EYE: OS_EXAM: NORMAL

## 2019-03-05 ASSESSMENT — EXTERNAL EXAM - RIGHT EYE: OD_EXAM: NORMAL

## 2019-03-05 NOTE — NURSING NOTE
Chief Complaints and History of Present Illnesses   Patient presents with     Follow Up     Chief Complaint(s) and History of Present Illness(es)     Follow Up     Laterality: both eyes    Onset: gradual    Onset: months ago    Quality: States va is the same since last visit      Frequency: constantly    Associated symptoms: Negative for dryness, redness and tearing    Pain scale: 0/10              Comments     Ambreen Escobar COT 8:14 AM March 5, 2019

## 2019-03-05 NOTE — PROGRESS NOTES
Gerard Manuel is a 83 year old male who presents for 6mo f/u after cataract surgery in both eyes in 8/2018. Reports stable VA since last visit - wears glasses for reading only. Denies eye pain, flashes, or new floaters. Takes ATs prn. Also uses warm compresses occasionally.      Of note: Started xarelto in November 2017 for atrial flutter.     Ocular medications:  Warm compresses as needed     1. Pseudophakia both eyes     Doing well   BCVA 20/20 each eye    History of monovision CL use prior to cataract surgery. Would like to get CL to wear in right eye for near correction    2. PCO both eyes     Excellent VA, patient not symptomatic   Monitor    3. Blepharitis/MGD both eyes   Continue warm compresses/lid hygiene twice a day    Artificial tears prn     4. Posterior vitreous detachment both eyes    Asymptomatic, no retinal tears, detachment   Retinal detachment precautions discussed (return for new flashes, floaters, curtain over vision)    Cassie or Dwayne for CTL fit for monovision    Anne Maddox MD   Ophthalmology PGY-4    Return to clinic 2 y earlier as needed     ~~~~~~~~~~~~~~~~~~~~~~~~~~~~~~~~~~~~~~~~~~~~~~~~~~~~~~~~~~~~~~~~    Complete documentation of historical and exam elements from today's encounter can be found in the full encounter summary report (not reduplicated in this progress note). I personally obtained the chief complaint(s) and history of present illness.  I confirmed and edited as necessary the review of systems, past medical/surgical history, family history, social history, and examination findings as documented by others.  I examined the patient myself, and I personally reviewed the relevant tests, images, and reports as documented above. I formulated and edited as necessary the assessment and plan and discussed the findings and management plan with the patient and family.     Gerardo Peña MD, MA  Director, Cornea & Anterior Segment  [facility] Department of  Ophthalmology & Visual Neuroscience

## 2019-03-11 ENCOUNTER — OFFICE VISIT (OUTPATIENT)
Dept: OPTOMETRY | Facility: CLINIC | Age: 84
End: 2019-03-11
Payer: MEDICARE

## 2019-03-11 DIAGNOSIS — Z96.1 PSEUDOPHAKIA: ICD-10-CM

## 2019-03-11 DIAGNOSIS — H52.4 PRESBYOPIA: Primary | ICD-10-CM

## 2019-03-11 ASSESSMENT — REFRACTION_MANIFEST
OD_SPHERE: -0.75
OD_CYLINDER: +0.75
OD_AXIS: 115

## 2019-03-11 ASSESSMENT — REFRACTION_WEARINGRX
OD_ADD: +2.75
OD_AXIS: 115
OD_SPHERE: -0.50
OS_CYLINDER: +0.75
OD_CYLINDER: +1.00
OS_AXIS: 080
OS_SPHERE: -0.25
OS_ADD: +2.75

## 2019-03-11 ASSESSMENT — REFRACTION_CURRENTRX
OD_BASECURVE: 8.6
OD_SPHERE: +2.00
OD_BRAND: BIOFINITY
OD_DIAMETER: 14.0

## 2019-03-11 ASSESSMENT — VISUAL ACUITY
OS_SC: 20/20-1
OS_CC: 20/20
CORRECTION_TYPE: GLASSES
METHOD: SNELLEN - LINEAR
OD_CC: 20/20

## 2019-03-11 ASSESSMENT — EXTERNAL EXAM - RIGHT EYE: OD_EXAM: NORMAL

## 2019-03-11 ASSESSMENT — EXTERNAL EXAM - LEFT EYE: OS_EXAM: NORMAL

## 2019-03-11 NOTE — PROGRESS NOTES
A/P  1.) Pseudophakia each eye  -Previous monovision right eye near wearer prior to cataract surgery  -Interested in part-time wear monovision lens  -Good initial vision/comfort/fit with Biofinity right eye  -Pt demo'd I&R in office, reviewed CL care and hygiene with pt    CLRx updated, okay to order if working well. Monitor 2 years here

## 2019-03-25 ENCOUNTER — TELEPHONE (OUTPATIENT)
Dept: OPTOMETRY | Facility: CLINIC | Age: 84
End: 2019-03-25

## 2019-03-25 NOTE — TELEPHONE ENCOUNTER
[facility] Call Center    Phone Message    May a detailed message be left on voicemail: yes    Reason for Call: Other: Pt is wanting Dr. Maribel Matthews that the right contact is having a strain well reading finer print and the contact are floppy to put in, yesterday pts contact folder when he put in, so he had to take out, is there a different contact that wouldn't be so floppy,  Please call the pt  back, thank you     Action Taken: Message routed to:  [facility] ([facility] Eye

## 2019-03-25 NOTE — TELEPHONE ENCOUNTER
Called pt, LVM. Biofinity (current brand) is already one of stiffest modulus lenses for soft lenses. Most other soft lenses are thinner/floppier/fold easier    If liking soft lens overall but just wanting more practice with I&R can return for tech soft lens I&R session.    If soft lenses difficult for him to handle we can consider switching to RGP.     Can call back clinic with any questions

## 2019-04-02 ENCOUNTER — HOSPITAL ENCOUNTER (EMERGENCY)
Facility: CLINIC | Age: 84
Discharge: HOME | End: 2019-04-02
Attending: EMERGENCY MEDICINE | Admitting: EMERGENCY MEDICINE
Payer: MEDICARE

## 2019-04-02 VITALS
SYSTOLIC BLOOD PRESSURE: 141 MMHG | BODY MASS INDEX: 23.19 KG/M2 | DIASTOLIC BLOOD PRESSURE: 77 MMHG | RESPIRATION RATE: 16 BRPM | TEMPERATURE: 97.4 F | HEART RATE: 74 BPM | WEIGHT: 162 LBS | OXYGEN SATURATION: 98 % | HEIGHT: 70 IN

## 2019-04-02 DIAGNOSIS — T15.91XA FOREIGN BODY OF RIGHT EYE, INITIAL ENCOUNTER: ICD-10-CM

## 2019-04-02 PROCEDURE — 99282 EMERGENCY DEPT VISIT SF MDM: CPT | Performed by: EMERGENCY MEDICINE

## 2019-04-02 PROCEDURE — 99284 EMERGENCY DEPT VISIT MOD MDM: CPT | Mod: Z6 | Performed by: EMERGENCY MEDICINE

## 2019-04-02 RX ORDER — PROPARACAINE HYDROCHLORIDE 5 MG/ML
2 SOLUTION/ DROPS OPHTHALMIC ONCE
Status: DISCONTINUED | OUTPATIENT
Start: 2019-04-02 | End: 2019-04-03 | Stop reason: HOSPADM

## 2019-04-02 ASSESSMENT — ENCOUNTER SYMPTOMS
EYE ITCHING: 0
CHILLS: 0
COUGH: 0
PHOTOPHOBIA: 0
COLOR CHANGE: 0
EYE PAIN: 1
FEVER: 0
EYE REDNESS: 1
NECK PAIN: 0
NECK STIFFNESS: 0
SHORTNESS OF BREATH: 0
EYE DISCHARGE: 1

## 2019-04-02 ASSESSMENT — MIFFLIN-ST. JEOR: SCORE: 1441.08

## 2019-04-02 NOTE — ED AVS SNAPSHOT
[facility], [place], Emergency Department  [address] [place] [id number]  Phone:  [phone]                                    Gerard Maunel   MRN: [id number]    Department:  [facility], Emergency Department   Date of Visit:  4/2/2019           After Visit Summary Signature Page    I have received my discharge instructions, and my questions have been answered. I have discussed any challenges I see with this plan with the nurse or doctor.    ..........................................................................................................................................  Patient/Patient Representative Signature      ..........................................................................................................................................  Patient Representative Print Name and Relationship to Patient    ..................................................               ................................................  Date                                   Time    ..........................................................................................................................................  Reviewed by Signature/Title    ...................................................              ..............................................  Date                                               Time          22EPIC Rev 08/18

## 2019-04-02 NOTE — ED TRIAGE NOTES
Pain in eye since Sunday with drainage. Pt has believes he has 1/3 of a contact lens stuck in his Right eye.

## 2019-04-03 NOTE — ED PROVIDER NOTES
[place] EMERGENCY DEPARTMENT ([facility])  4/02/19   History     Chief Complaint   Patient presents with     Foreign Body in Eye     The history is provided by the patient.     Gerard Manuel is a 83 year old male with a medical history significant for right-sided cataract IOL surgery (7/23/2018) who presents to the Emergency Department for evaluation of a possible foreign body in his right eye.  The patient reports that he removed the contact lens from his right eye approximately 5-6 days ago.  Since that time he has been having intermittent irritation in his right eye.  The patient has been putting antibiotic drops in his eye that he had left over from last year and has been placing warm packs on his eyes.  Patient notes that he has had some right eye redness today.  The patient today decided to look at the contact lens that he had removed and he noticed that 1/3rd of the contact lens had broken off; he believes that this piece is still in his eye and is what is irritating his right eye.  The patient did try looking in his eye in the mirror today, but was unable to see the piece of the contact lens.  Patient denies any pain or fevers.    I have reviewed the Medications, Allergies, Past Medical and Surgical History, and Social History in the [employer] system.    Past Medical History:   Diagnosis Date     Arthritis      Atrial flutter (H)      Blepharitis      Cataracts, both eyes      Hypertension     on meds     MGD (meibomian gland dysfunction)      Pacemaker        Past Surgical History:   Procedure Laterality Date     APPENDECTOMY       CARDIAC SURGERY      CABGx4     CATARACT IOL, RT/LT Left 07/09/2018     CATARACT IOL, RT/LT Right 07/23/2018     MOHS MICROGRAPHIC PROCEDURE       PHACOEMULSIFICATION CLEAR CORNEA WITH STANDARD INTRAOCULAR LENS IMPLANT Left 7/9/2018    Procedure: PHACOEMULSIFICATION CLEAR CORNEA WITH STANDARD INTRAOCULAR LENS IMPLANT;  LEFT EYE PHACOEMULSIFICATION CLEAR CORNEA WITH  "STANDARD INTRAOCULAR LENS IMPLANT ;  Surgeon: Gerardo Peña MD;  Location: [facility]     PHACOEMULSIFICATION CLEAR CORNEA WITH STANDARD INTRAOCULAR LENS IMPLANT Right 7/23/2018    Procedure: PHACOEMULSIFICATION CLEAR CORNEA WITH STANDARD INTRAOCULAR LENS IMPLANT;  RIGHT EYE PHACOEMULSIFICATION CLEAR CORNEA WITH STANDARD INTRAOCULAR LENS IMPLANT ;  Surgeon: Gerardo Peña MD;  Location: [facility]     Quadruple Bypass Surgery  1992       Family History   Problem Relation Age of Onset     Cancer Brother         skin cancer     Skin Cancer Brother      Skin Cancer Sister      Glaucoma No family hx of      Macular Degeneration No family hx of        Social History     Tobacco Use     Smoking status: Never Smoker     Smokeless tobacco: Never Used   Substance Use Topics     Alcohol use: No       Current Facility-Administered Medications   Medication     fluorescein (FUL-LESA) ophthalmic strip 1 strip     proparacaine (ALCAINE) 0.5 % ophthalmic solution 2 drop     Current Outpatient Medications   Medication     aspirin 325 MG tablet     atenolol (TENORMIN) 25 MG tablet     rivaroxaban ANTICOAGULANT (XARELTO) 20 MG TABS tablet     simvastatin (ZOCOR) 40 MG tablet      No Known Allergies      Review of Systems   Constitutional: Negative for chills and fever.   HENT: Negative for congestion.    Eyes: Positive for pain, discharge and redness. Negative for photophobia, itching and visual disturbance.   Respiratory: Negative for cough and shortness of breath.    Musculoskeletal: Negative for neck pain and neck stiffness.   Skin: Negative for color change.   All other systems reviewed and are negative.      Physical Exam   BP: 132/75  Pulse: 74  Temp: 97.8  F (36.6  C)  Resp: 16  Height: 177.8 cm (5' 10\")  Weight: 73.5 kg (162 lb)  SpO2: 95 %      Physical Exam   Constitutional: He is oriented to person, place, and time. He appears well-developed and well-nourished. No distress.   HENT:   Head: Normocephalic and atraumatic. "   Eyes: EOM are normal. Pupils are equal, round, and reactive to light. Right eye exhibits no discharge. Left eye exhibits discharge ( clear, from right eye). No scleral icterus.   Slight conjunctival injection and right eye.  There is a foreign body, resembling part of a contact lens, that rapidly retracted underneath patient's right upper eyelid.  No obvious corneal ulcer or globe rupture.   Neck: Normal range of motion. Neck supple.   Cardiovascular: Normal rate.   Pulmonary/Chest: Effort normal. No respiratory distress.   Musculoskeletal: Normal range of motion.   Neurological: He is alert and oriented to person, place, and time. No cranial nerve deficit. He exhibits normal muscle tone. Coordination normal.   Skin: Skin is warm. He is not diaphoretic. No erythema.   Psychiatric: He has a normal mood and affect. His behavior is normal. Judgment and thought content normal.   Nursing note and vitals reviewed.      ED Course   8:31 PM  The patient was seen and examined by Jason Alberts MD in Room ED04.        Procedures             Critical Care time:  none             Labs Ordered and Resulted from Time of ED Arrival Up to the Time of Departure from the ED - No data to display         Assessments & Plan (with Medical Decision Making)   83-year-old man presenting with right eye irritation for several days.  Differential diagnosis: Foreign body in the eye, corneal abrasion, corneal ulcer, acute conjunctivitis, unlikely globe rupture.    After thorough history and physical examination, patient appears to be in no acute distress.  I was able to locate a foreign body in his eye; however, I was unable to remove it due to patient's inability to control his blinking, the foreign body rapidly retracts underneath his right upper eyelid.  I did consult ophthalmology for further input.    Ophthalmology was consulted and they were able to remove foreign body.  Patient had no corneal abrasion.  Recommendations were made to take  ofloxacin drops that he has at home for 3 days and increase his artificial tear use for 7 days and follow-up in ophthalmology clinic if needed.  He agrees with this plan.  At this time, he is stable for discharge.    This part of the medical record was transcribed by Isidro Arellano, Medical Scribe, from a dictation done by Jason Alberts MD.       I have reviewed the nursing notes.    I have reviewed the findings, diagnosis, plan and need for follow up with the patient.       Medication List      There are no discharge medications for this visit.         Final diagnoses:   Foreign body of right eye, initial encounter     I, Isidro Arellano, am serving as a trained medical scribe to document services personally performed by Jason Alberts MD, based on the provider's statements to me.   I, Jason Alberts MD, was physically present and have reviewed and verified the accuracy of this note documented by Isidro Arellano.    4/2/2019   [facility], [place], EMERGENCY DEPARTMENT     Odalys Alberts MD  04/03/19 0045

## 2019-04-03 NOTE — DISCHARGE INSTRUCTIONS
Please make an appointment to follow up with Eye Clinic (phone: [phone]) in 5-7 days unless symptoms completely resolve.  Please take your antibiotic drops that you have at home for 3 days and increase your artificial tear usage for 1 week.

## 2019-04-03 NOTE — CONSULTS
OPHTHALMOLOGY CONSULT NOTE  04/02/19    Patient: Gerard Manuel  Consulted by:   Reason for Consult: Retained contact lens fragment    HISTORY OF PRESENTING ILLNESS:     Gerard Manuel is a 83 year old male who presents with retained contact lens fragment of the right eye. Recently seen by Dr. Matthews for monovision CTL. Put one in 5 days ago and thought he took it out the same day. Eye irritated after that with redness and mild drainage/tearing. Vision stable. He looked in his contact lens case today and noticed the contact was ripped, so assumed part of it was still in his right eye. Evaluated in ER, CTL fragment seen but unable to remove. Left eye stable.    Review of systems were otherwise negative except for that which has been stated above.      OCULAR/MEDICAL/SURGICAL HISTORIES:     Past Ocular History:  Pseudophakia, each eye   Refractive error    Past Medical History:   Diagnosis Date     Arthritis      Atrial flutter (H)      Blepharitis      Cataracts, both eyes      Hypertension     on meds     MGD (meibomian gland dysfunction)      Pacemaker        Past Surgical History:   Procedure Laterality Date     APPENDECTOMY       CARDIAC SURGERY      CABGx4     CATARACT IOL, RT/LT Left 07/09/2018     CATARACT IOL, RT/LT Right 07/23/2018     MOHS MICROGRAPHIC PROCEDURE       PHACOEMULSIFICATION CLEAR CORNEA WITH STANDARD INTRAOCULAR LENS IMPLANT Left 7/9/2018    Procedure: PHACOEMULSIFICATION CLEAR CORNEA WITH STANDARD INTRAOCULAR LENS IMPLANT;  LEFT EYE PHACOEMULSIFICATION CLEAR CORNEA WITH STANDARD INTRAOCULAR LENS IMPLANT ;  Surgeon: Gerardo Peña MD;  Location: [facility]     PHACOEMULSIFICATION CLEAR CORNEA WITH STANDARD INTRAOCULAR LENS IMPLANT Right 7/23/2018    Procedure: PHACOEMULSIFICATION CLEAR CORNEA WITH STANDARD INTRAOCULAR LENS IMPLANT;  RIGHT EYE PHACOEMULSIFICATION CLEAR CORNEA WITH STANDARD INTRAOCULAR LENS IMPLANT ;  Surgeon: Gerardo Peña MD;  Location: [facility]      Quadruple Bypass Surgery  1992       EXAMINATION:     Slit Lamp and Fundus Exam     External Exam       Right Left    External Normal Normal          Slit Lamp Exam       Right Left    Lids/Lashes Upper lid dermatochalasis, Blepharitis 1+, mgd, upper lid erythema Upper lid dermatochalasis, Blepharitis 1+, mgd    Conjunctiva/Sclera Tr injection temporally, retained contact fragment in superior fornix White and quiet    Cornea Few PEE centrally, no abrasion Clear    Anterior Chamber Deep and quiet Deep and quiet    Iris Round and reactive Round and reactive    Lens PCIOL good position PCIOL good position              Labs/Studies/Imaging Performed  NONE     ASSESSMENT/PLAN:     Gerard Manuel is a 83 year old male who presents with retained contact in right eye.     Retained contact lens, right eye  -Removed at slit lamp without complication  -Cornea with few PEE but no abrasion  -Continue Ofloxacin TID for 3 days  -Increase ATs for the next 7 days  -Contact lens holiday  -Follow up PRN      It is our pleasure to participate in this patient's care and treatment. Please contact us with any further questions or concerns.    Antonieta Eid MD  Ophthalmology Resident, PGY-2    Teaching Statement  I did not examine the patient, but was available to see the patient if needed. I have discussed the case with the resident and the assessment and plan as documented seems reasonable to me.    Eva Feldman MD  Comprehensive Ophthalmology & Ocular Pathology  Department of Ophthalmology and Visual Neurosciences  [email or web address]  Pager [phone]

## 2019-10-01 ENCOUNTER — OFFICE VISIT (OUTPATIENT)
Dept: DERMATOLOGY | Facility: CLINIC | Age: 84
End: 2019-10-01
Payer: MEDICARE

## 2019-10-01 DIAGNOSIS — L57.8 DIFFUSE PHOTODAMAGE OF SKIN: ICD-10-CM

## 2019-10-01 DIAGNOSIS — Z85.828 HISTORY OF SKIN CANCER: Primary | ICD-10-CM

## 2019-10-01 DIAGNOSIS — L82.1 SEBORRHEIC KERATOSIS: ICD-10-CM

## 2019-10-01 DIAGNOSIS — L57.0 ACTINIC KERATOSIS: ICD-10-CM

## 2019-10-01 DIAGNOSIS — B35.3 TINEA PEDIS OF BOTH FEET: ICD-10-CM

## 2019-10-01 DIAGNOSIS — D22.9 MULTIPLE BENIGN NEVI: ICD-10-CM

## 2019-10-01 DIAGNOSIS — L82.0 SEBORRHEIC KERATOSES, INFLAMED: ICD-10-CM

## 2019-10-01 ASSESSMENT — PAIN SCALES - GENERAL: PAINLEVEL: NO PAIN (0)

## 2019-10-01 NOTE — NURSING NOTE
Dermatology Rooming Note    Gerard Manuel's goals for this visit include:   Chief Complaint   Patient presents with     Skin Check     Skin check, Gerard states he has concerning area on his face and ears.      Jayda Santos LPN

## 2019-10-01 NOTE — LETTER
10/1/2019       RE: Gerard Manuel  [address] [place] [address]     Dear Colleague,    Thank you for referring your patient, Gerard Manuel, to the [facility] DERMATOLOGY at [facility]. Please see a copy of my visit note below.    [facility] Dermatology Note      Dermatology Problem List:    Last TBSE: 10/1/2019  1. Basal cell carcinoma on the nose, s/p excision in 2015  2.  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 Serrano  3. Seborrheic keratoses s/p LN2 on 10/1/2019  4. AKs on face s/p LN2 on 10/1/2019  5. Tinea pedis, bilateral   - Current treatment: econazole 1% cream BID   6. Onychomycosis, bilateral   - We discussed oral medications and concluded that the risks likely outweigh the benefits given his age and medical history  7. Senile purpura   -  Recommended Vitamin C 500 mg BID and Rutoside 50 mg BID  if okay with cardiologist    Encounter Date: Oct 1, 2019    CC:   Chief Complaint   Patient presents with     Skin Check     Skin check, Gerard states he has concerning area on his face and ears.      History of Present Illness:    Mr. Gerard Manuel is a 83 year old male who presents for skin cancer screening in the setting of basal cell carcinoma of the nose status post excision in 2015, as well as squamous cell carcinoma of the left dorsal hand status post excision in November 2018.  Today, the patient denies any spots that are itching, burning, bleeding or tingling.  He notes several stuck on spots on the trunk and extremities.  Otherwise, he denies constitutional symptoms or any other skin problems.  He tries to stay out of the sun or wear sunscreen whenever possible.  He does have a history of tinea pedis and onychomycosis.  He had been using econazole 1% cream twice daily to the feet, which he said did help, but that he ran out of medication.  His  oral medications for onychomycosis have been deferred given his age and comorbidities..     Past Medical History:   Patient Active Problem List   Diagnosis     Blepharitis     BCC (basal cell carcinoma), face     Inflamed seborrheic keratosis     Tinea pedis     History of basal cell carcinoma     Seborrheic keratoses, inflamed     Hypertrophic scar     Neoplasm of uncertain behavior of skin     Past Medical History:   Diagnosis Date     Arthritis      Atrial flutter (H)      Blepharitis      Cataracts, both eyes      Hypertension     on meds     MGD (meibomian gland dysfunction)      Pacemaker      Past Surgical History:   Procedure Laterality Date     APPENDECTOMY       CARDIAC SURGERY      CABGx4     CATARACT IOL, RT/LT Left 07/09/2018     CATARACT IOL, RT/LT Right 07/23/2018     MOHS MICROGRAPHIC PROCEDURE       PHACOEMULSIFICATION CLEAR CORNEA WITH STANDARD INTRAOCULAR LENS IMPLANT Left 7/9/2018    Procedure: PHACOEMULSIFICATION CLEAR CORNEA WITH STANDARD INTRAOCULAR LENS IMPLANT;  LEFT EYE PHACOEMULSIFICATION CLEAR CORNEA WITH STANDARD INTRAOCULAR LENS IMPLANT ;  Surgeon: Gerardo Peña MD;  Location: [facility]     PHACOEMULSIFICATION CLEAR CORNEA WITH STANDARD INTRAOCULAR LENS IMPLANT Right 7/23/2018    Procedure: PHACOEMULSIFICATION CLEAR CORNEA WITH STANDARD INTRAOCULAR LENS IMPLANT;  RIGHT EYE PHACOEMULSIFICATION CLEAR CORNEA WITH STANDARD INTRAOCULAR LENS IMPLANT ;  Surgeon: Gerardo Peña MD;  Location: [facility]     Quadruple Bypass Surgery  1992       Social History:  Patient reports that he has never smoked. He has never used smokeless tobacco. He reports that he does not drink alcohol or use drugs.    Family History:  Family History   Problem Relation Age of Onset     Cancer Brother         skin cancer     Skin Cancer Brother      Skin Cancer Sister      Glaucoma No family hx of      Macular Degeneration No family hx of        Medications:  Current Outpatient Medications   Medication Sig Dispense  Refill     aspirin 325 MG tablet Take 81 mg by mouth daily       atenolol (TENORMIN) 25 MG tablet Take 50 mg by mouth daily       rivaroxaban ANTICOAGULANT (XARELTO) 20 MG TABS tablet Take 20 mg by mouth daily (with dinner)       simvastatin (ZOCOR) 40 MG tablet Take by mouth At Bedtime          No Known Allergies      Review of Systems:  -Skin Establ Pt: The patient denies any new rash, pruritus, or lesions that are symptomatic, changing or bleeding, except as per HPI.  -Constitutional: Otherwise feeling well today, in usual state of health.  -HEENT: Patient denies nonhealing oral sores.  -Skin: As above in HPI. No additional skin concerns.    Physical exam:  Vitals: There were no vitals taken for this visit.  GEN: This is a well developed, well-nourished male in no acute distress, in a pleasant mood.    SKIN: Total skin excluding the undergarment areas was performed. The exam included the head/face, neck, both arms, chest, back, abdomen, both legs, digits and/or nails.   -Palacios skin type: I  -There are erythematous macules with overyling adherent scale on the face.   -Multiple regular brown pigmented macules and papules are identified on the trunk and extremities that have uniform pigment networks on dermatoscopy.   -There are fine lines and dyspigmentation on sun exposed areas of the face and chest.  -There are yellow oily papules with central umbilication located on the face.  Scattered brown macules on sun exposed areas.  -There are waxy stuck on tan to brown papules on the trunk and extremities.  -At the feet, there are scaly, annular patches.  -At the right antitragus, there is a flesh colored roughly 7 mm nodule that is nontender to palpation and without overlying skin changes  -Purple patches noted on the arms at sites of patient reported minor traumas  -No other lesions of concern on areas examined.    Impression/Plan:  1. History of nonmelanoma skin cancer, no clinical evidence of  recurrence    ABCDs of melanoma were discussed and self skin checks were advised. Sun precaution was advised including the use of sun screens of SPF 30 or higher, sun protective clothing, and avoidance of tanning beds.    2. Benign skin findings, including seborrheic keratoses, sebaceous hyperplasia and solar lentigines    The benign nature was reviewed with the patient and reassurance provided. No treatment is necessary, unless if the lesion changes or becomes symptomatic in the future.    3. Asymmetric nodule of the right antitragus    Overall, the clinical appearance is benign. We can continue to monitor this. The dDx includes a neurofibroma, with CNH less favored. A NMSC or melanoma is not suspected.    4. Tinea pedis    Refilled econazole cream BID    5. Seborrheic keratosis, symptomatic, of the posterior left leg  CRYOTHERAPY PROCEDURE NOTE  After verbal consent and discussion of risks and benefits including but no limited to dyspigmentation/scar, blister, and pain, 1 lesion was(were) treated with 1-2mm freeze border for 2 cycle(s) with liquid nitrogen. Post cryotherapy instructions were provided.    6. Actinic keratoses of the face    CRYOTHERAPY PROCEDURE NOTE  After verbal consent and discussion of risks and benefits including but no limited to dyspigmentation/scar, blister, and pain, 2 lesions was(were) treated with 1-2mm freeze border for 2 cycle(s) with liquid nitrogen. Post cryotherapy instructions were provided.    CC Referred MD Cory  No address on file on close of this encounter.  Follow-up in 1 year, earlier for new or changing lesions.     Dr. Stone staffed the patient.    Staff Involved:  Resident(Dr. Jfefrey Chapa)/Staff    Again, thank you for allowing me to participate in the care of your patient.      Sincerely,    Basil Stone MD

## 2019-10-01 NOTE — PROGRESS NOTES
[facility] Dermatology Note      Dermatology Problem List:    Last TBSE: 10/1/2019  1. Basal cell carcinoma on the nose, s/p excision in 2015  2.  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 Serrano  3. Seborrheic keratoses s/p LN2 on 10/1/2019  4. AKs on face s/p LN2 on 10/1/2019  5. Tinea pedis, bilateral   - Current treatment: econazole 1% cream BID   6. Onychomycosis, bilateral   - We discussed oral medications and concluded that the risks likely outweigh the benefits given his age and medical history  7. Senile purpura   - Recommended Vitamin C 500 mg BID and Rutoside 50 mg BID if okay with cardiologist    Encounter Date: Oct 1, 2019    CC:   Chief Complaint   Patient presents with     Skin Check     Skin check, Gerard states he has concerning area on his face and ears.      History of Present Illness:    Mr. Gerard Manuel is a 83 year old male who presents for skin cancer screening in the setting of basal cell carcinoma of the nose status post excision in 2015, as well as squamous cell carcinoma of the left dorsal hand status post excision in November 2018.  Today, the patient denies any spots that are itching, burning, bleeding or tingling.  He notes several stuck on spots on the trunk and extremities.  Otherwise, he denies constitutional symptoms or any other skin problems.  He tries to stay out of the sun or wear sunscreen whenever possible.  He does have a history of tinea pedis and onychomycosis.  He had been using econazole 1% cream twice daily to the feet, which he said did help, but that he ran out of medication.  His oral medications for onychomycosis have been deferred given his age and comorbidities..     Past Medical History:   Patient Active Problem List   Diagnosis     Blepharitis     BCC (basal cell carcinoma), face     Inflamed seborrheic keratosis     Tinea pedis     History of basal cell  carcinoma     Seborrheic keratoses, inflamed     Hypertrophic scar     Neoplasm of uncertain behavior of skin     Past Medical History:   Diagnosis Date     Arthritis      Atrial flutter (H)      Blepharitis      Cataracts, both eyes      Hypertension     on meds     MGD (meibomian gland dysfunction)      Pacemaker      Past Surgical History:   Procedure Laterality Date     APPENDECTOMY       CARDIAC SURGERY      CABGx4     CATARACT IOL, RT/LT Left 07/09/2018     CATARACT IOL, RT/LT Right 07/23/2018     MOHS MICROGRAPHIC PROCEDURE       PHACOEMULSIFICATION CLEAR CORNEA WITH STANDARD INTRAOCULAR LENS IMPLANT Left 7/9/2018    Procedure: PHACOEMULSIFICATION CLEAR CORNEA WITH STANDARD INTRAOCULAR LENS IMPLANT;  LEFT EYE PHACOEMULSIFICATION CLEAR CORNEA WITH STANDARD INTRAOCULAR LENS IMPLANT ;  Surgeon: Gerardo Peña MD;  [facility]:  [facility]     PHACOEMULSIFICATION CLEAR CORNEA WITH STANDARD INTRAOCULAR LENS IMPLANT Right 7/23/2018    Procedure: PHACOEMULSIFICATION CLEAR CORNEA WITH STANDARD INTRAOCULAR LENS IMPLANT;  RIGHT EYE PHACOEMULSIFICATION CLEAR CORNEA WITH STANDARD INTRAOCULAR LENS IMPLANT ;  Surgeon: Gerardo Peña MD;  Location:  [facility]     Quadruple Bypass Surgery  1992       Social History:  Patient reports that he has never smoked. He has never used smokeless tobacco. He reports that he does not drink alcohol or use drugs.    Family History:  Family History   Problem Relation Age of Onset     Cancer Brother         skin cancer     Skin Cancer Brother      Skin Cancer Sister      Glaucoma No family hx of      Macular Degeneration No family hx of        Medications:  Current Outpatient Medications   Medication Sig Dispense Refill     aspirin 325 MG tablet Take 81 mg by mouth daily       atenolol (TENORMIN) 25 MG tablet Take 50 mg by mouth daily       rivaroxaban ANTICOAGULANT (XARELTO) 20 MG TABS tablet Take 20 mg by mouth daily (with dinner)       simvastatin (ZOCOR) 40 MG tablet Take by mouth At  Bedtime          No Known Allergies      Review of Systems:  -Skin Establ Pt: The patient denies any new rash, pruritus, or lesions that are symptomatic, changing or bleeding, except as per HPI.  -Constitutional: Otherwise feeling well today, in usual state of health.  -HEENT: Patient denies nonhealing oral sores.  -Skin: As above in HPI. No additional skin concerns.    Physical exam:  Vitals: There were no vitals taken for this visit.  GEN: This is a well developed, well-nourished male in no acute distress, in a pleasant mood.    SKIN: Total skin excluding the undergarment areas was performed. The exam included the head/face, neck, both arms, chest, back, abdomen, both legs, digits and/or nails.   -Palacios skin type: I  -There are erythematous macules with overyling adherent scale on the face.   -Multiple regular brown pigmented macules and papules are identified on the trunk and extremities that have uniform pigment networks on dermatoscopy.   -There are fine lines and dyspigmentation on sun exposed areas of the face and chest.  -There are yellow oily papules with central umbilication located on the face.  Scattered brown macules on sun exposed areas.  -There are waxy stuck on tan to brown papules on the trunk and extremities.  -At the feet, there are scaly, annular patches.  -At the right antitragus, there is a flesh colored roughly 7 mm nodule that is nontender to palpation and without overlying skin changes  -Purple patches noted on the arms at sites of patient reported minor traumas  -No other lesions of concern on areas examined.    Impression/Plan:  1. History of nonmelanoma skin cancer, no clinical evidence of recurrence    ABCDs of melanoma were discussed and self skin checks were advised. Sun precaution was advised including the use of sun screens of SPF 30 or higher, sun protective clothing, and avoidance of tanning beds.    2. Benign skin findings, including seborrheic keratoses, sebaceous  hyperplasia and solar lentigines    The benign nature was reviewed with the patient and reassurance provided. No treatment is necessary, unless if the lesion changes or becomes symptomatic in the future.    3. Asymmetric papulonodule of the right antitragus    Overall, the clinical appearance is benign. We can continue to monitor this. The dDx includes a neurofibroma, with CNH less favored. A NMSC or melanoma is not suspected.    4. Tinea pedis    Refilled econazole cream BID    5. Seborrheic keratosis, symptomatic/inflamed, of the posterior left leg  CRYOTHERAPY PROCEDURE NOTE  After verbal consent and discussion of risks and benefits including but no limited to dyspigmentation/scar, blister, and pain, 1 lesion was(were) treated with 1-2mm freeze border for 2 cycle(s) with liquid nitrogen. Post cryotherapy instructions were provided.    6. Actinic keratoses of the face    CRYOTHERAPY PROCEDURE NOTE  After verbal consent and discussion of risks and benefits including but no limited to dyspigmentation/scar, blister, and pain, 2 lesions was(were) treated with 1-2mm freeze border for 2 cycle(s) with liquid nitrogen. Post cryotherapy instructions were provided.    CC Referred MD Cory  No address on file on close of this encounter.  Follow-up in 1 year, earlier for new or changing lesions.     Dr. Stone staffed the patient.    Staff Involved:  Resident(Dr. Jeffrey Chapa)/Staff    I have seen and examined this patient and agree with the assessment and plan as documented in the resident's note, and was present for all procedures.    Basil Stone MD  Dermatology Attending

## 2019-10-01 NOTE — PATIENT INSTRUCTIONS
"      Information on protecting and monitoring your skin    It is important to keep your skin protected from the sun. The best sun protection is avoiding the sun from getting to your skin by seeking shade, staying out of the sun during peak hours (10 AM-2 PM), wearing long sleeves or sun-protective clothing (such as \"Coolibar,\" a [place]-based company).     If these measures are not feasible, or for other sun-exposed areas (like the face), it is important to wear wear sunscreen SPF 30+ that covers both UVA and UVB. We prefer physical block sunscreens with zinc oxide or titanium dioxide as the active ingredients. Examples include Neutrogena Sensitive Skin, Elta MD (available on Symplified or at Drop Messages) or VanShedWorxream sunscreens.     In addition, watch for signs of worrisome moles and check all your skin regularly. Monthly is recommended.     This includes:  A - asymmetry: not the same on the both sides  B - border: irregular borders, jagged or bumpy borders  C - color: very black or multi colored moles  D - diameter: anything bigger than a pencil eraser  E - evolution: anything that is changing    Moles should not be tender, bleed, or itch.     It is important to let us know if you have any of these concerning features right away.     Avoid tanning bed use as this raises the risk of developing skin cancer.      Cryotherapy    What is it?    Use of a very cold liquid, such as liquid nitrogen, to freeze and destroy abnormal skin cells that need to be removed    What should I expect?    Tenderness and redness    A small blister that might grow and fill with dark purple blood. There may be crusting.    More than one treatment may be needed if the lesions do not go away.    How do I care for the treated area?    Gently wash the area with your hands when bathing.    Use a thin layer of Vaseline to help with healing. You may use a Band-Aid.     The area should heal within 7-10 days and may leave behind a pink or " lighter color.     Do not use an antibiotic or Neosporin ointment.     You may take acetaminophen (Tylenol) for pain.     Call your Doctor if you have:    Severe pain    Signs of infection (warmth, redness, cloudy yellow drainage, and or a bad smell)    Questions or concerns    Who should I call with questions?       [facility]: [phone]       [facility]: [phone]       For urgent needs outside of business hours call the [facility] at [phone]        and ask for the dermatology resident on call

## 2019-10-28 PROBLEM — L57.0 ACTINIC KERATOSIS: Status: ACTIVE | Noted: 2019-10-28

## 2019-11-25 DIAGNOSIS — D48.5 NEOPLASM OF UNCERTAIN BEHAVIOR OF SKIN: ICD-10-CM

## 2019-11-27 RX ORDER — ECONAZOLE NITRATE 10 MG/G
CREAM TOPICAL
Qty: 85 G | Refills: 10 | Status: SHIPPED | OUTPATIENT
Start: 2019-11-27 | End: 2021-10-05

## 2020-07-20 ENCOUNTER — TELEPHONE (OUTPATIENT)
Dept: OPHTHALMOLOGY | Facility: CLINIC | Age: 85
End: 2020-07-20

## 2020-07-20 NOTE — TELEPHONE ENCOUNTER
[facility] Call Center    Phone Message    May a detailed message be left on voicemail: yes     Reason for Call: Other: Previous Pt of Dr Peña - last office visit note says 1.Return for annual CEE - Pt is wondering who he should see now and how soon you want to see him in clinic - pleaes return his call to discuss - Thanks     Action Taken: Message routed to:  [facility] EYE    Travel Screening: Not Applicable

## 2020-08-12 ENCOUNTER — OFFICE VISIT (OUTPATIENT)
Dept: OPHTHALMOLOGY | Facility: CLINIC | Age: 85
End: 2020-08-12
Attending: OPHTHALMOLOGY
Payer: MEDICARE

## 2020-08-12 DIAGNOSIS — H01.00B BLEPHARITIS OF UPPER AND LOWER EYELIDS OF BOTH EYES, UNSPECIFIED TYPE: Primary | ICD-10-CM

## 2020-08-12 DIAGNOSIS — H01.00A BLEPHARITIS OF UPPER AND LOWER EYELIDS OF BOTH EYES, UNSPECIFIED TYPE: Primary | ICD-10-CM

## 2020-08-12 DIAGNOSIS — H02.9 LESION OF RIGHT LOWER EYELID: ICD-10-CM

## 2020-08-12 DIAGNOSIS — H52.203 MYOPIC ASTIGMATISM OF BOTH EYES: ICD-10-CM

## 2020-08-12 DIAGNOSIS — H52.13 MYOPIC ASTIGMATISM OF BOTH EYES: ICD-10-CM

## 2020-08-12 DIAGNOSIS — H43.813 POSTERIOR VITREOUS DETACHMENT OF BOTH EYES: ICD-10-CM

## 2020-08-12 DIAGNOSIS — Z96.1 PSEUDOPHAKIA OF BOTH EYES: ICD-10-CM

## 2020-08-12 DIAGNOSIS — H52.4 PRESBYOPIA: ICD-10-CM

## 2020-08-12 PROCEDURE — 92015 DETERMINE REFRACTIVE STATE: CPT | Mod: GY,ZF

## 2020-08-12 PROCEDURE — G0463 HOSPITAL OUTPT CLINIC VISIT: HCPCS | Mod: ZF

## 2020-08-12 ASSESSMENT — VISUAL ACUITY
OD_SC: 20/20 SLOW
OS_SC: 20/20 SLOW
OD_SC+: -1
OS_SC+: -2
METHOD: SNELLEN - LINEAR

## 2020-08-12 ASSESSMENT — REFRACTION_WEARINGRX
OD_SPHERE: -0.50
OS_CYLINDER: +0.75
OS_SPHERE: -0.25
OS_ADD: +2.75
OD_CYLINDER: +1.00
OD_AXIS: 115
OD_ADD: +2.75
OS_AXIS: 080

## 2020-08-12 ASSESSMENT — CUP TO DISC RATIO
OD_RATIO: 0.25
OS_RATIO: 0.3

## 2020-08-12 ASSESSMENT — REFRACTION_MANIFEST
OD_AXIS: 111
OD_SPHERE: -0.50
OS_CYLINDER: +0.50
OS_SPHERE: -0.25
OD_ADD: +2.50
OD_CYLINDER: +0.75
OS_ADD: +2.50
OS_AXIS: 073

## 2020-08-12 ASSESSMENT — TONOMETRY
OS_IOP_MMHG: 10
OD_IOP_MMHG: 12
IOP_METHOD: TONOPEN

## 2020-08-12 ASSESSMENT — CONF VISUAL FIELD
OD_NORMAL: 1
OS_NORMAL: 1

## 2020-08-12 ASSESSMENT — EXTERNAL EXAM - LEFT EYE: OS_EXAM: NORMAL

## 2020-08-12 ASSESSMENT — EXTERNAL EXAM - RIGHT EYE: OD_EXAM: NORMAL

## 2020-08-12 NOTE — NURSING NOTE
Chief Complaints and History of Present Illnesses   Patient presents with     Annual Eye Exam     Chief Complaint(s) and History of Present Illness(es)     Annual Eye Exam     Laterality: both eyes    Course: stable    Associated symptoms: Negative for dryness, eye pain, tearing, flashes, floaters and discharge    Treatments tried: artificial tears    Pain scale: 0/10              Comments     Pt denies any significant vision changes in either eye since last visit.  Denies any flashes, floaters, pain, pressure, irritation, discharge, and tearing.  Ocular Meds: AT's PRN BE    Lyn Ramirez OT 8:02 AM August 12, 2020

## 2020-08-12 NOTE — PROGRESS NOTES
HPI:  Gerard Manuel is a 84 year old male here for annual eye exam. He was previously followed by Dr. Peña. He feels his vision is overall good and stable in both eyes with current glasses. No changes since his last visit with Dr. Peña. Would like updated eyeglasses prescription to get some new frames. Denies new or worsening floaters, flashes, vision changes.    POH cataract surgery in both eyes in summer 2018  GTTS - AT prn - not very often  PMH - atrial flutter - on xarelto and aspirin (s/p ablation)  FH -  No history of glaucoma or AMD    Assessment & Plan   (H01.00A,  H01.00B) Blepharitis of upper and lower eyelids of both eyes, unspecified type (primary encounter diagnosis)  Comment: Using warm compress prn and AT. Appears stable   Plan: Continue artificial tears prn and warm compresses as needed.    (Z96.1) Pseudophakia of both eyes   Comment: Doing well, BCVA 20/20  Plan: Monitor    (H43.813) Posterior vitreous detachment of both eyes  Comment: Stable  Plan: Discussed signs/sx of RT/RD and the patient knows to call immediately if they develop these symptoms.     (H02.9) Lesion of right lower eyelid  Comment: flat benign appearing lesion of right lower eyelid, patient has not noticed  Plan: Monitor, consider biopsy if changing     (H52.203,  H52.13) Myopic astigmatism of both eyes  (H52.4) Presbyopia  Comment: Stable  Plan: Provided new mrx today    -----------------------------------------------------------------------------------    Patient disposition:   Return in about 1 year (around 8/12/2021). or sooner as needed.    Qing Wu MD  Ophthalmology PGY2    Teaching statement:  Complete documentation of historical and exam elements from today's encounter can be found in the full encounter summary report (not reduplicated in this progress note). I personally obtained the chief complaint(s) and history of present illness.  I confirmed and edited as necessary the review of systems, past  medical/surgical history, family history, social history, and examination findings as documented by others; and I examined the patient myself. I personally reviewed the relevant tests, images, and reports as documented above.     I formulated and edited as necessary the assessment and plan and discussed the findings and management plan with the patient and family.      Eva Feldman MD  Comprehensive Ophthalmology & Ocular Pathology  Department of Ophthalmology and Visual Neurosciences  [email or web address]  Pager [phone]

## 2020-10-05 ENCOUNTER — OFFICE VISIT (OUTPATIENT)
Dept: OPHTHALMOLOGY | Facility: CLINIC | Age: 85
End: 2020-10-05
Attending: OPHTHALMOLOGY
Payer: MEDICARE

## 2020-10-05 DIAGNOSIS — H01.00A BLEPHARITIS OF UPPER AND LOWER EYELIDS OF BOTH EYES, UNSPECIFIED TYPE: ICD-10-CM

## 2020-10-05 DIAGNOSIS — H01.00B BLEPHARITIS OF UPPER AND LOWER EYELIDS OF BOTH EYES, UNSPECIFIED TYPE: ICD-10-CM

## 2020-10-05 DIAGNOSIS — H00.14 CHALAZION OF LEFT UPPER EYELID: Primary | ICD-10-CM

## 2020-10-05 PROCEDURE — 99213 OFFICE O/P EST LOW 20 MIN: CPT | Mod: GC | Performed by: OPHTHALMOLOGY

## 2020-10-05 PROCEDURE — G0463 HOSPITAL OUTPT CLINIC VISIT: HCPCS

## 2020-10-05 RX ORDER — NEOMYCIN SULFATE, POLYMYXIN B SULFATE, AND DEXAMETHASONE 3.5; 10000; 1 MG/G; [USP'U]/G; MG/G
OINTMENT OPHTHALMIC
Qty: 3.5 G | Refills: 0 | Status: SHIPPED | OUTPATIENT
Start: 2020-10-05 | End: 2023-12-11

## 2020-10-05 ASSESSMENT — REFRACTION_WEARINGRX
OS_ADD: +2.75
OS_CYLINDER: +0.75
OD_CYLINDER: +1.00
OS_SPHERE: -0.25
OS_AXIS: 080
OD_SPHERE: -0.50
OD_AXIS: 115
OD_ADD: +2.75

## 2020-10-05 ASSESSMENT — CONF VISUAL FIELD
OD_NORMAL: 1
OS_NORMAL: 1

## 2020-10-05 ASSESSMENT — TONOMETRY
OS_IOP_MMHG: 12
IOP_METHOD: ICARE
OD_IOP_MMHG: 11

## 2020-10-05 ASSESSMENT — VISUAL ACUITY
OS_SC: 20/30
METHOD: SNELLEN - LINEAR
OD_SC: 20/20
OS_CC: 20/20

## 2020-10-05 ASSESSMENT — SLIT LAMP EXAM - LIDS: COMMENTS: BLEPHARITIS

## 2020-10-05 NOTE — NURSING NOTE
Chief Complaints and History of Present Illnesses   Patient presents with     Blurred Vision Evaluation     Chief Complaint(s) and History of Present Illness(es)     Blurred Vision Evaluation               Comments     Gerard Manuel is a 84 year old male who presents today for    (H01.00A,  H01.00B) Blepharitis of upper and lower eyelids of both eyes, unspecified type (primary encounter diagnosis)  (Z96.1) Pseudophakia of both eyes    (H43.813) Posterior vitreous detachment of both eyes  (H02.9) Lesion of right lower eyelid  (H52.203,  H52.13) Myopic astigmatism of both eyes  (H52.4) Presbyopia    Eye pain and tenderness in the left eye. Improved since onset, after he used a hot compress. Redness and lid swelling with episodes of blurred vision.     Karon ORDOÑEZ 2:31 PM October 5, 2020

## 2020-10-05 NOTE — PROGRESS NOTES
HPI:  Patient is an 85 yo M presenting for left eye issues. Starting Thursday and Friday morning, he started having pain in the left eye. By Saturday, the left upper lid was red and swollen. The left eye vision was intermittently blurry as well. He tried warm compress TID on Saturday and Sunday. Today the left eye feels a little bit blurry. Patient is using refresh tears TID over the weekend, but normally he does not use it too much.     POH cataract surgery in both eyes in summer 2018  GTTS - AT prn - not very often  PMH - atrial flutter - on xarelto and aspirin (s/p ablation)  FH -  No history of glaucoma or AMD    Assessment & Plan   (H00.14) Chalazion of left upper eyelid  Comment: Using warm compress TID and AT  Plan: Continue to use warm compress TID and AT. Start Maxtirol ointment BID on DEWEY for 2 weeks. RTC PRN or call in 4-6 weeks if symptoms fail to improve. Discussed return precautions.     (H01.00A,  H01.00B) Blepharitis of upper and lower eyelids of both eyes, unspecified type (primary encounter diagnosis)  Comment: Using warm compress prn and AT. Appears stable   Plan: Continue artificial tears prn and warm compresses as above    NOT ADDRESSED TODAY:  (Z96.1) Pseudophakia of both eyes   Comment: Doing well, BCVA 20/20  Plan: Monitor    (H43.813) Posterior vitreous detachment of both eyes  Comment: Stable  Plan: Discussed signs/sx of RT/RD and the patient knows to call immediately if they develop these symptoms.     (H02.9) Lesion of right lower eyelid  Comment: flat benign appearing lesion of right lower eyelid, patient has not noticed  Plan: Monitor, consider biopsy if changing     (H52.203,  H52.13) Myopic astigmatism of both eyes  (H52.4) Presbyopia  Comment: Stable  Plan: Provided new mrx today    -----------------------------------------------------------------------------------    Patient disposition:   Return if symptoms worsen or fail to improve. or sooner as needed.    Seen and discussed with  Dr. Feldman.     Jaycob Cotto MD  Ophthalmology PGY-3    Teaching statement:  Complete documentation of historical and exam elements from today's encounter can be found in the full encounter summary report (not reduplicated in this progress note). I personally obtained the chief complaint(s) and history of present illness.  I confirmed and edited as necessary the review of systems, past medical/surgical history, family history, social history, and examination findings as documented by others; and I examined the patient myself. I personally reviewed the relevant tests, images, and reports as documented above.     I formulated and edited as necessary the assessment and plan and discussed the findings and management plan with the patient and family.    Eva Feldman MD  Comprehensive Ophthalmology & Ocular Pathology  Department of Ophthalmology and Visual Neurosciences  [email or web address]  Pager [phone]

## 2020-10-15 ENCOUNTER — OFFICE VISIT (OUTPATIENT)
Dept: DERMATOLOGY | Facility: CLINIC | Age: 85
End: 2020-10-15
Payer: MEDICARE

## 2020-10-15 DIAGNOSIS — L57.0 ACTINIC KERATOSIS: ICD-10-CM

## 2020-10-15 DIAGNOSIS — L82.1 SEBORRHEIC KERATOSES: ICD-10-CM

## 2020-10-15 DIAGNOSIS — D22.9 MULTIPLE BENIGN NEVI: ICD-10-CM

## 2020-10-15 DIAGNOSIS — L81.4 SOLAR LENTIGO: ICD-10-CM

## 2020-10-15 DIAGNOSIS — Z85.828 HISTORY OF NONMELANOMA SKIN CANCER: ICD-10-CM

## 2020-10-15 DIAGNOSIS — Z12.83 SCREENING EXAM FOR SKIN CANCER: Primary | ICD-10-CM

## 2020-10-15 PROCEDURE — 17003 DESTRUCT PREMALG LES 2-14: CPT | Mod: GC | Performed by: DERMATOLOGY

## 2020-10-15 PROCEDURE — 17000 DESTRUCT PREMALG LESION: CPT | Mod: GC | Performed by: DERMATOLOGY

## 2020-10-15 PROCEDURE — 99214 OFFICE O/P EST MOD 30 MIN: CPT | Mod: 25 | Performed by: DERMATOLOGY

## 2020-10-15 ASSESSMENT — PAIN SCALES - GENERAL: PAINLEVEL: NO PAIN (0)

## 2020-10-15 NOTE — PATIENT INSTRUCTIONS
"Cryotherapy    What is it?    Use of a very cold liquid, such as liquid nitrogen, to freeze and destroy abnormal skin cells that need to be removed    What should I expect?    Tenderness and redness    A small blister that might grow and fill with dark purple blood. There may be crusting.    More than one treatment may be needed if the lesions do not go away.    How do I care for the treated area?    Gently wash the area with your hands when bathing.    Use a thin layer of Vaseline to help with healing. You may use a Band-Aid.     The area should heal within 7-10 days and may leave behind a pink or lighter color.     Do not use an antibiotic or Neosporin ointment.     You may take acetaminophen (Tylenol) for pain.     Call your Doctor if you have:    Severe pain    Signs of infection (warmth, redness, cloudy yellow drainage, and or a bad smell)    Questions or concerns    Who should I call with questions?       [facility]: [phone]       [facility]: [phone]       For urgent needs outside of business hours call the [facility] at [phone]        and ask for the dermatology resident on call      Sun Protection    Sunscreen   What does \"broad spectrum mean\"?  Broad spectrum sunscreens protect against both UVA and UVB radiation. UVC is filtered out by the ozone layer.     What does SPF mean?   SPF stands for  Sun Protection Factor  and represents the ability to screen only UVB (burning) rays. UVB rays are mostly blocked in all sunscreens, but only those that contain titanium dioxide, zinc oxide, mexoryl or Parsol 1789 (avobenzone) block the UVA spectrum. Even though a sunscreen is labeled  UVA/UVB Protection  that is not entirely accurate because products that only partially protect against UVA can claim to protect against both UVA and UVB.     What SPF should I chose?   Aim to get a sunscreen that is at least sun protection factor (SPF) " 30. SPF 15 provides about 92-93% coverage, SPF 30 about 95-97% coverage, and SPF 45 about 98% coverage. That is to say, SPF 30 is not twice as good as SPF 15. The reason why we recommend SPF 30 is because we are usually only putting on half the necessary amount of sunscreen to achieve the advertised protection. That means that it is very possible that your SPF 30 sunscreen is only providing you with SPF 15 coverage based on how much you are applying. SPF 15 (92-93% coverage) is the absolute minimal that we recommend. Similarly, the benefit of sunscreens with SPF higher than 50 is that even if you put on less than the required amount, you are likely still getting good protection (ex: even if you apply only half the recommended amount of , it should still provide you with an SPF of 50).     How much should I apply?  If covering your whole body, you should be using 30 grams, or one ounce, which is how much is in one shot glass! That s a THICK layer! May times, you are only applying half the recommended amount, which means that you are only getting half the SPF (for example, you may be using SPF 30 but if you're only applying half the recommended amount, you're only getting SPF 15 protection.      When do I need to wear sunscreen?  Every day, rain or shine! Even on a rainy day or a day when you are only indoors, you are still being exposed harmful UV radiation from the sun. We usually recommend physical/mineral sunscreens (active ingredient is titanium dioxide or zinc oxide) as these ingredients have been around for many years, there is no concern of them being absorbed into the bloodstream, and they are coral reef friendly! However, the best sunscreen is the one that you will use everyday.     What about my kids?  Sunscreen is not recommended for infants under the age of 6 months. Use clothing, shade and sun avoidance for small infants. For kids older than 6 months, we recommend that you should use only  mineral/physical sunscreens that have zinc oxide as the active ingredient. Sun-protective clothing and hats are also important for people of all ages.     Sun protective Clothing:  www.coolibar.com  www.sunprecautions.com  www.isaac.com    Do I need tinted sunscreen?  There is more and more research showing that visible light can also lead to discoloration (such as melasma). Tinted sunscreens (which contain iron oxides) protect against visible light as an added bonus.     What brands do you recommend?    Physical/Mineral Sunscreens (in no particular order)  Elta MD UV Physical Broad-Spectrum SPF 41 Sunscreen (Tinted, $33)  Skin Ceuticals Physical Fusion UV Defense SPF 50 (Tinted, $34)  Unsun Mineral Tinted Face Sunscreen (Tinted, with 2 shade ranges, $29)  It Cosmetics CC+ Cream with SPF 50+ (Tinted, can also double as foundation/coverage -- great range of shades, $40)  Biossance Squalane + Zinc Sheer Mineral Sunscreen SPF 30 PA +++ (goes on white then blends in, $30)  Cerave 100% Mineral Sunscreen SPF 50 Face (good for sensitive skin, $15)  La Roche Posay Anthelios Mineral Zinc Oxide Sunscreen SPF 50 ($35)  La Roche Posay Anthelios Mineral Tinted Sunscreen for Face SPF 50 ($35)  Think Sport Sunscreen (great for sports, though has more of a white cast, $20)  Think Baby Sunscreen (for kids, $21)  Color Science Sunforgettable Total Protection Brush On Shield SPF 50 (Multiple tints, $130)    Chemical Sunscreens  Leanne Bonillaau Weightless Protection SPF 30 ($48)  Delia SPF Brightening Moisturizer ($30)  Urban Skin Complexion Protection Moisturizer SPF 30 ($20)  Total Defense + Repair Broad Spectrum SPF 34 ($68)  Clarins UV PLUS Anti-Pollution Sunscreen Multi-Protection Tint SPF 50 (Multiple tints, $45)  Neutrogena Healthy Skin Glow Sheers Tinted Moisturizer with SPF 20 (Multiple tints, $11)    The ABCDEs of Melanoma  Skin cancer can develop anywhere on the skin. Once a month, take a look at your entire body and note  "any changing moles or spots. Ask someone for help when checking your skin, especially for hard to see places such as your back. If you notice a mole that looks different from others, or one that changes, enlarges, itches, or bleeds, you should see a dermatologist.    Asymmetry, Border (irregularity), Color (not uniform, changes in color), Diameter (greater than 6 mm which is about the size of a pencil eraser), and Evolving (any changes in pre-existing moles). In short, look for the \"ugly duckling.\" You want all of the spots on your body to look like cousins (like they could be related). If something stands out, take a photo of it and make an appointment to have it evaluated.       "

## 2020-10-15 NOTE — LETTER
"10/15/2020       RE: Gerard Manuel  [address] [place] [address]     Dear Colleague,    Thank you for referring your patient, Gerard Manuel, to the [facility]ERMATOLOGY [facility] [place] at [facility]. Please see a copy of my visit note below.    [facility] Dermatology Note      Dermatology Problem List:  Last TBSE: 10/15/2020  1. Basal cell carcinoma on the nose, s/p excision in 2015  2. 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 Serrano  3. Seborrheic keratoses s/p LN2 on 10/1/19  4. 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 Tinea pedis, bilateral   - Current treatment: econazole 1% cream BID   6. Onychomycosis, bilateral   - We discussed oral medications and concluded that the risks likely outweigh the benefits given his age and medical history  7. Senile purpura   - Recommended Vitamin C 500 mg BID and Rutoside 50 mg BID if okay with cardiologist  8. Raynaud's phenomenon    Encounter Date: Oct 15, 2020    CC:   Chief Complaint   Patient presents with     Derm Problem     Gerard is here today for a skin check. He states \" I have a dry scalp and feet. 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 He was previously seen on 10/1/2019 where cryotherapy was performed to treat an inflamed seborrheic keratosis of the posterior left leg. Today, he reports that both he and his wife have notice a few rough red spots on his right ear and on the right cheek below his eye. He also reports persistent peeling of his feet, however states that he has used econazole cream for many weeks earlier this year for tinea pedis, however it seems to persist. He states that because of his many medications, including a statin and Xarelto, he was not started on oral " terbinafine for this. He is otherwise doing well and has not noticed any new lesions in the scars of his prior skin cancer excisions.    Past Medical History:   Patient Active Problem List   Diagnosis     Blepharitis     BCC (basal cell carcinoma), face     Inflamed seborrheic keratosis     Tinea pedis     History of skin cancer     Seborrheic keratoses, inflamed     Hypertrophic scar     Neoplasm of uncertain behavior of skin     Actinic keratosis     Past Medical History:   Diagnosis Date     Arthritis      Atrial flutter (H)      Blepharitis      Cataracts, both eyes      Hypertension     on meds     MGD (meibomian gland dysfunction)      Pacemaker      Past Surgical History:   Procedure Laterality Date     APPENDECTOMY       CARDIAC SURGERY      CABGx4     CATARACT IOL, RT/LT Left 07/09/2018     CATARACT IOL, RT/LT Right 07/23/2018     MOHS MICROGRAPHIC PROCEDURE       PHACOEMULSIFICATION CLEAR CORNEA WITH STANDARD INTRAOCULAR LENS IMPLANT Left 7/9/2018    Procedure: PHACOEMULSIFICATION CLEAR CORNEA WITH STANDARD INTRAOCULAR LENS IMPLANT;  LEFT EYE PHACOEMULSIFICATION CLEAR CORNEA WITH STANDARD INTRAOCULAR LENS IMPLANT ;  Surgeon: Gerardo Peña MD;  Location: [facility]     PHACOEMULSIFICATION CLEAR CORNEA WITH STANDARD INTRAOCULAR LENS IMPLANT Right 7/23/2018    Procedure: PHACOEMULSIFICATION CLEAR CORNEA WITH STANDARD INTRAOCULAR LENS IMPLANT;  RIGHT EYE PHACOEMULSIFICATION CLEAR CORNEA WITH STANDARD INTRAOCULAR LENS IMPLANT ;  Surgeon: Gerardo Peña MD;  Location: [facility]     Quadruple Bypass Surgery  1992       Social History:  Patient reports that he has never smoked. He has never used smokeless tobacco. He reports that he does not drink alcohol or use drugs.    Family History:  Family History   Problem Relation Age of Onset     Cancer Brother         skin cancer     Skin Cancer Brother      Skin Cancer Sister      Glaucoma No family hx of      Macular Degeneration No family hx of         Medications:  Current Outpatient Medications   Medication Sig Dispense Refill     aspirin 325 MG tablet Take 81 mg by mouth daily       atenolol (TENORMIN) 25 MG tablet Take 50 mg by mouth daily       neomycin-polymyxin-dexamethasone (MAXITROL) 3.5-24554-4.1 ophthalmic ointment Use a thin strip on the left upper eyelid at bedtime or twice a day for 2 weeks. 3.5 g 0     rivaroxaban ANTICOAGULANT (XARELTO) 20 MG TABS tablet Take 20 mg by mouth daily (with dinner)       simvastatin (ZOCOR) 40 MG tablet Take by mouth At Bedtime       econazole nitrate 1 % external cream APPLY A THIN LAYER TO BOTTOM OF FEET TWICE DAILY AFTER WASHING THE FEET. DO NOT FORGET TO APPLY BETWEEN THE TOES (Patient not taking: Reported on 10/15/2020) 85 g 10        No Known Allergies    Review of Systems:  -Skin Establ Pt: The patient denies any new rash, pruritus, or lesions that are symptomatic, changing or bleeding, except as per HPI.  -Const: Denies fevers, chills or changes in weight.   -Constitutional: Otherwise feeling well today, in usual state of health.  -HEENT: Patient denies nonhealing oral sores.  -Skin: As above in HPI. No additional skin concerns.    Physical exam:  Vitals: There were no vitals taken for this visit.  GEN: This is a well developed, well-nourished male in no acute distress, in a pleasant mood.    SKIN: Full skin, which includes the head/face, both arms, chest, back, abdomen,both legs, genitalia and/or groin buttocks, digits and/or nails, was examined.  -Palacios skin type: II  -There are erythematous macules with overyling adherent scale on the right ear and right forehead above the brow  -There are numerous scattered waxy stuck on tan to brown papules on the face, back, neck, chest, abdomen and all extremities  -Light tan macule on the inner ear surface  -Multiple scattered skin colored and tan macules and papules of the trunk consistent with benign nevi  -Scattered brown macules on sun exposed  areas  -Linear hypopigmented slightly depressed plaques consistent with scar of the nose and left dorsal hand with no papules, telangiectasias or crusting within these plaques  -Thickening and discoloration of all toenails with slight peeling and scaling of the plantar surfaces of the feet  -No other lesions of concern on areas examined.     Impression/Plan:  1. Actinic keratosis x2, right ear and right forehead  - Cryotherapy procedure note: After verbal consent and discussion of risks and benefits including but no limited to dyspigmentation/scar, blister, and pain, 2 lesions were treated with 1-2mm freeze border for 2 cycles with liquid nitrogen. Post cryotherapy instructions were provided.    2. History of nonmelanoma skin cancer, no clincial evidence of recurrence  - Sun precaution was advised including the use of sun screens of SPF 30 or higher, sun protective clothing, and avoidance of tanning beds.  - ABCDs of melanoma were discussed and self skin checks were advised.   - Recommend annual skin checks    3. Onychomycosis and tinea pedis  - Continue econazole 1% cream twice daily as needed  - Oral terbinafine not started due to comorbidities and multiple medications with potential for interaction    4. Benign skin findings: benign nevi, seborrheic keratoses, solar lentigines  Discussed the natural history and benign nature of this lesion. Reassurance provided that no additional treatment is necessary.   - No treatment needed at this time, patient to notify us of any changes     Follow-up in 1 year, earlier for new or changing lesions.     Dr. Stone staffed the patient.    Staff Involved:  Ambreen Pina DO (PGY-2)/Staff    I have seen and examined this patient and agree with the assessment and plan as documented in the resident's note, and was present for all procedures.    Basil Stone MD  Dermatology Attending

## 2020-10-15 NOTE — PROGRESS NOTES
"[facility] Dermatology Note      Dermatology Problem List:  Last TBSE: 10/15/2020  1. Basal cell carcinoma on the nose, s/p excision in 2015  2. 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 Serrano  3. Seborrheic keratoses s/p LN2 on 10/1/19  4. 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 Tinea pedis, bilateral   - Current treatment: econazole 1% cream BID   6. Onychomycosis, bilateral   - We discussed oral medications and concluded that the risks likely outweigh the benefits given his age and medical history  7. Senile purpura   - Recommended Vitamin C 500 mg BID and Rutoside 50 mg BID if okay with cardiologist  8. Raynaud's phenomenon    Encounter Date: Oct 15, 2020    CC:   Chief Complaint   Patient presents with     Derm Problem     Gerard is here today for a skin check. He states \" I have a dry scalp and feet. 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 He was previously seen on 10/1/2019 where cryotherapy was performed to treat an inflamed seborrheic keratosis of the posterior left leg. Today, he reports that both he and his wife have notice a few rough red spots on his right ear and on the right cheek below his eye. He also reports persistent peeling of his feet, however states that he has used econazole cream for many weeks earlier this year for tinea pedis, however it seems to persist. He states that because of his many medications, including a statin and Xarelto, he was not started on oral terbinafine for this. He is otherwise doing well and has not noticed any new lesions in the scars of his prior skin cancer excisions.    Past Medical History:   Patient Active Problem List   Diagnosis     Blepharitis     BCC (basal cell carcinoma), face     Inflamed seborrheic keratosis     Tinea pedis     " History of skin cancer     Seborrheic keratoses, inflamed     Hypertrophic scar     Neoplasm of uncertain behavior of skin     Actinic keratosis     Past Medical History:   Diagnosis Date     Arthritis      Atrial flutter (H)      Blepharitis      Cataracts, both eyes      Hypertension     on meds     MGD (meibomian gland dysfunction)      Pacemaker      Past Surgical History:   Procedure Laterality Date     APPENDECTOMY       CARDIAC SURGERY      CABGx4     CATARACT IOL, RT/LT Left 07/09/2018     CATARACT IOL, RT/LT Right 07/23/2018     MOHS MICROGRAPHIC PROCEDURE       PHACOEMULSIFICATION CLEAR CORNEA WITH STANDARD INTRAOCULAR LENS IMPLANT Left 7/9/2018    Procedure: PHACOEMULSIFICATION CLEAR CORNEA WITH STANDARD INTRAOCULAR LENS IMPLANT;  LEFT EYE PHACOEMULSIFICATION CLEAR CORNEA WITH STANDARD INTRAOCULAR LENS IMPLANT ;  Surgeon: Gerardo Peña MD;  Location:  [facility]     PHACOEMULSIFICATION CLEAR CORNEA WITH STANDARD INTRAOCULAR LENS IMPLANT Right 7/23/2018    Procedure: PHACOEMULSIFICATION CLEAR CORNEA WITH STANDARD INTRAOCULAR LENS IMPLANT;  RIGHT EYE PHACOEMULSIFICATION CLEAR CORNEA WITH STANDARD INTRAOCULAR LENS IMPLANT ;  Surgeon: Gerardo Peña MD;  Location: [facility]     Quadruple Bypass Surgery  1992       Social History:  Patient reports that he has never smoked. He has never used smokeless tobacco. He reports that he does not drink alcohol or use drugs.    Family History:  Family History   Problem Relation Age of Onset     Cancer Brother         skin cancer     Skin Cancer Brother      Skin Cancer Sister      Glaucoma No family hx of      Macular Degeneration No family hx of        Medications:  Current Outpatient Medications   Medication Sig Dispense Refill     aspirin 325 MG tablet Take 81 mg by mouth daily       atenolol (TENORMIN) 25 MG tablet Take 50 mg by mouth daily       neomycin-polymyxin-dexamethasone (MAXITROL) 3.5-73270-5.1 ophthalmic ointment Use a thin strip on the left upper eyelid  at bedtime or twice a day for 2 weeks. 3.5 g 0     rivaroxaban ANTICOAGULANT (XARELTO) 20 MG TABS tablet Take 20 mg by mouth daily (with dinner)       simvastatin (ZOCOR) 40 MG tablet Take by mouth At Bedtime       econazole nitrate 1 % external cream APPLY A THIN LAYER TO BOTTOM OF FEET TWICE DAILY AFTER WASHING THE FEET. DO NOT FORGET TO APPLY BETWEEN THE TOES (Patient not taking: Reported on 10/15/2020) 85 g 10        No Known Allergies    Review of Systems:  -Skin Establ Pt: The patient denies any new rash, pruritus, or lesions that are symptomatic, changing or bleeding, except as per HPI.  -Const: Denies fevers, chills or changes in weight.   -Constitutional: Otherwise feeling well today, in usual state of health.  -HEENT: Patient denies nonhealing oral sores.  -Skin: As above in HPI. No additional skin concerns.    Physical exam:  Vitals: There were no vitals taken for this visit.  GEN: This is a well developed, well-nourished male in no acute distress, in a pleasant mood.    SKIN: Full skin, which includes the head/face, both arms, chest, back, abdomen,both legs, genitalia and/or groin buttocks, digits and/or nails, was examined.  -Palacios skin type: II  -There are erythematous macules with overyling adherent scale on the right ear and right forehead above the brow  -There are numerous scattered waxy stuck on tan to brown papules on the face, back, neck, chest, abdomen and all extremities  -Light tan macule on the inner ear surface  -Multiple scattered skin colored and tan macules and papules of the trunk consistent with benign nevi  -Scattered brown macules on sun exposed areas  -Linear hypopigmented slightly depressed plaques consistent with scar of the nose and left dorsal hand with no papules, telangiectasias or crusting within these plaques  -Thickening and discoloration of all toenails with slight peeling and scaling of the plantar surfaces of the feet  -No other lesions of concern on areas  examined.     Impression/Plan:  1. Actinic keratosis x2, right ear and right forehead  - Cryotherapy procedure note: After verbal consent and discussion of risks and benefits including but no limited to dyspigmentation/scar, blister, and pain, 2 lesions were treated with 1-2mm freeze border for 2 cycles with liquid nitrogen. Post cryotherapy instructions were provided.    2. History of nonmelanoma skin cancer, no clincial evidence of recurrence  - Sun precaution was advised including the use of sun screens of SPF 30 or higher, sun protective clothing, and avoidance of tanning beds.  - ABCDs of melanoma were discussed and self skin checks were advised.   - Recommend annual skin checks    3. Onychomycosis and tinea pedis  - Continue econazole 1% cream twice daily as needed  - Oral terbinafine not started due to comorbidities and multiple medications with potential for interaction    4. Benign skin findings: benign nevi, seborrheic keratoses, solar lentigines  Discussed the natural history and benign nature of this lesion. Reassurance provided that no additional treatment is necessary.   - No treatment needed at this time, patient to notify us of any changes     Follow-up in 1 year, earlier for new or changing lesions.     Dr. Stone staffed the patient.    Staff Involved:  Ambreen Pina DO (PGY-2)/Staff    I have seen and examined this patient and agree with the assessment and plan as documented in the resident's note, and was present for all procedures.    Basil Stone MD  Dermatology Attending

## 2020-10-15 NOTE — NURSING NOTE
"Chief Complaint   Patient presents with     Derm Problem     Gerard is here today for a skin check. He states \" I have a dry scalp and feet. I have a lot of spots all over my body\"     Vale Olvera, RMMELVIN  "

## 2020-11-01 PROBLEM — L82.1 SEBORRHEIC KERATOSES: Status: ACTIVE | Noted: 2020-11-01

## 2020-11-01 PROBLEM — D22.9 MULTIPLE BENIGN NEVI: Status: ACTIVE | Noted: 2020-11-01

## 2021-10-05 ENCOUNTER — OFFICE VISIT (OUTPATIENT)
Dept: DERMATOLOGY | Facility: CLINIC | Age: 86
End: 2021-10-05
Payer: MEDICARE

## 2021-10-05 DIAGNOSIS — L57.0 ACTINIC KERATOSIS: ICD-10-CM

## 2021-10-05 DIAGNOSIS — Z85.828 HISTORY OF NONMELANOMA SKIN CANCER: Primary | ICD-10-CM

## 2021-10-05 DIAGNOSIS — L84 CALLUS: ICD-10-CM

## 2021-10-05 DIAGNOSIS — L82.0 SEBORRHEIC KERATOSES, INFLAMED: ICD-10-CM

## 2021-10-05 DIAGNOSIS — D22.9 MULTIPLE BENIGN NEVI: ICD-10-CM

## 2021-10-05 DIAGNOSIS — D48.5 NEOPLASM OF UNCERTAIN BEHAVIOR OF SKIN: ICD-10-CM

## 2021-10-05 DIAGNOSIS — Z12.83 SCREENING EXAM FOR SKIN CANCER: ICD-10-CM

## 2021-10-05 DIAGNOSIS — L81.4 SOLAR LENTIGO: ICD-10-CM

## 2021-10-05 DIAGNOSIS — L82.1 SEBORRHEIC KERATOSES: ICD-10-CM

## 2021-10-05 PROCEDURE — 17110 DESTRUCTION B9 LES UP TO 14: CPT | Mod: GC | Performed by: DERMATOLOGY

## 2021-10-05 PROCEDURE — 99213 OFFICE O/P EST LOW 20 MIN: CPT | Mod: 25 | Performed by: DERMATOLOGY

## 2021-10-05 RX ORDER — ECONAZOLE NITRATE 10 MG/G
CREAM TOPICAL
Qty: 85 G | Refills: 10 | Status: SHIPPED | OUTPATIENT
Start: 2021-10-05

## 2021-10-05 ASSESSMENT — PAIN SCALES - GENERAL: PAINLEVEL: NO PAIN (0)

## 2021-10-05 NOTE — PATIENT INSTRUCTIONS
Good skin check today. We froze a single benign spot. We will resume the topical antifungal for your feet - new prescription sent. We will refer you to our podiatry colleagues. We will see you back in 1 year.

## 2021-10-05 NOTE — LETTER
10/5/2021       RE: Gerard Manuel  [address] [place] [address]     Dear Colleague,    Thank you for referring your patient, Gerard Manuel, to the [facility] DERMATOLOGY CLINIC [place] at [facility]. Please see a copy of my visit note below.    [facility] Dermatology Note  Encounter Date: Oct 5, 2021  Office Visit     Dermatology Problem List:  Last TBSE: 10/5/2021  1. Basal cell carcinoma on the nose, s/p excision in 2015  2. 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 Serrano  3. Seborrheic keratoses s/p LN2 on 10/1/19  4. 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 Tinea pedis, bilateral   - Current treatment: econazole 1% cream BID   6. Onychomycosis, bilateral   - We discussed oral medications and concluded that the risks likely outweigh the benefits given his age and medical history  7. Senile purpura   - Recommended Vitamin C 500 mg BID and Rutoside 50 mg BID if okay with cardiologist  8. Raynaud's phenomenon    ____________________________________________    Assessment & Plan:     #Hx NMSC  - No evidence of recurrence  - Sun avoidance and protective strategies discussed including use of daily SPF 30+ sunscreen  - ABCDEs of melanoma and s/s of NMSC discussed    #Tinea pedis  #Callus  - Resume econazole  - Encouraged better fitting shoes, corn pads  - Referral to Podiatry given bony hypertrophy, callus    #Inflamed SK - right upper arm  - Cryotherapy performed today (see procedure note(s) below).   - reassurance provided     #Benign lesions (cherry angiomas, SKs, lentigines, clinically banal appearing melanocytic nevi)  - ABCDs of melanoma were discussed and self skin checks were advised.  - Signs and symptoms of NMSC discussed  - Sun precaution was advised including the use of sun screens of SPF 30 or higher, sun protective clothing, and avoidance of tanning beds.  - Ultimately  reassurance provided and benign nature of condition discussed    Procedures Performed:   - Cryotherapy procedure note, location(s): right upper arm. After verbal consent and discussion of risks and benefits including, but not limited to, dyspigmentation/scar, blister, and pain, 1 lesion(s) was(were) treated with 1-2 mm freeze border for 1-2 cycles with liquid nitrogen. Post cryotherapy instructions were provided.      Follow-up: 1 year(s) in-person, or earlier for new or changing lesions    Staff and Resident:     This patient was staffed with Dr. Stone.    Gideon Handy MD  Internal Medicine - Dermatology PGY 4    I have seen and examined this patient and agree with the assessment and plan as documented in the resident's note, and was present for all procedures.    Basil Stone MD  Dermatology Attending    ____________________________________________    CC: Skin Check (Gerard is here today for a skin check. He is concerned about spot on his feet and has a history of skin cancer.)    HPI:  Mr. Gerard Manuel is a(n) 85 year old male who presents today as a return patient for FBSE hx NMSC. Has scaling and callus on his feet. Has a brown stuck on spot which sometimes get inflamed and caught on things. Patient is otherwise feeling well, without additional skin concerns.  NO new areas of nonhealing sores or bleeding bumps.    Labs Reviewed:  N/A    Physical Exam:  Vitals: There were no vitals taken for this visit.  SKIN: Full skin, which includes the head/face, both arms, chest, back, abdomen,both legs, genitalia and/or groin buttocks, digits and/or nails, was examined.  - Innumerable flat brown and waxy brown black papules and plaques - erythematous base to lesion on right arm  -Homogenous tan macules on sun exposed skin  -Scattered bright red domed papules on trunk and extremities  -Well-circumscribed fleshy papules w/o concerning dermatoscopic or clinical features  -Callus on left foot  -Prior site(s) of skin  cancer surgery well-healed without evidence of recurrence  -Thickened and yellow discolored nails on bilateral first toe - interdigital and moccasin patterned scale on bilateral feet  - No other lesions of concern on areas examined.     Medications:  Current Outpatient Medications   Medication     aspirin 325 MG tablet     atenolol (TENORMIN) 25 MG tablet     econazole nitrate 1 % external cream     neomycin-polymyxin-dexamethasone (MAXITROL) 3.5-90838-4.1 ophthalmic ointment     simvastatin (ZOCOR) 40 MG tablet     rivaroxaban ANTICOAGULANT (XARELTO) 20 MG TABS tablet     No current facility-administered medications for this visit.      Past Medical History:   Patient Active Problem List   Diagnosis     Blepharitis     BCC (basal cell carcinoma), face     Inflamed seborrheic keratosis     Tinea pedis     History of skin cancer     Seborrheic keratoses, inflamed     Hypertrophic scar     Neoplasm of uncertain behavior of skin     Actinic keratosis     Screening exam for skin cancer     Multiple benign nevi     Seborrheic keratoses     Past Medical History:   Diagnosis Date     Arthritis      Atrial flutter (H)      Blepharitis      Cataracts, both eyes      Hypertension     on meds     MGD (meibomian gland dysfunction)      Pacemaker        CC Referred Self, MD  No address on file on close of this encounter.

## 2021-10-05 NOTE — NURSING NOTE
Dermatology Rooming Note    Gerard Manuel's goals for this visit include:   Chief Complaint   Patient presents with     Skin Check     Gerard is here today for a skin check. He is concerned about spot on his feet and has a history of skin cancer.     Paul Hill, [facility]

## 2021-10-05 NOTE — PROGRESS NOTES
[facility] Dermatology Note  Encounter Date: Oct 5, 2021  Office Visit     Dermatology Problem List:  Last TBSE: 10/5/2021  1. Basal cell carcinoma on the nose, s/p excision in 2015  2. 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 Serrano  3. Seborrheic keratoses s/p LN2 on 10/1/19  4. 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 Tinea pedis, bilateral   - Current treatment: econazole 1% cream BID   6. Onychomycosis, bilateral   - We discussed oral medications and concluded that the risks likely outweigh the benefits given his age and medical history  7. Senile purpura   - Recommended Vitamin C 500 mg BID and Rutoside 50 mg BID if okay with cardiologist  8. Raynaud's phenomenon    ____________________________________________    Assessment & Plan:     #Hx NMSC  - No evidence of recurrence  - Sun avoidance and protective strategies discussed including use of daily SPF 30+ sunscreen  - ABCDEs of melanoma and s/s of NMSC discussed    #Tinea pedis  #Callus  - Resume econazole  - Encouraged better fitting shoes, corn pads  - Referral to Podiatry given bony hypertrophy, callus    #Inflamed SK - right upper arm  - Cryotherapy performed today (see procedure note(s) below).   - reassurance provided     #Benign lesions (cherry angiomas, SKs, lentigines, clinically banal appearing melanocytic nevi)  - ABCDs of melanoma were discussed and self skin checks were advised.  - Signs and symptoms of NMSC discussed  - Sun precaution was advised including the use of sun screens of SPF 30 or higher, sun protective clothing, and avoidance of tanning beds.  - Ultimately reassurance provided and benign nature of condition discussed    Procedures Performed:   - Cryotherapy procedure note, location(s): right upper arm. After verbal consent and discussion of risks and benefits including, but not limited to, dyspigmentation/scar, blister, and pain, 1 lesion(s) was(were) treated with 1-2 mm freeze  border for 1-2 cycles with liquid nitrogen. Post cryotherapy instructions were provided.      Follow-up: 1 year(s) in-person, or earlier for new or changing lesions    Staff and Resident:     This patient was staffed with Dr. Stone.    Gideon Handy MD  Internal Medicine - Dermatology PGY 4    I have seen and examined this patient and agree with the assessment and plan as documented in the resident's note, and was present for all procedures.    Basil Stone MD  Dermatology Attending    ____________________________________________    CC: Skin Check (Gerard is here today for a skin check. He is concerned about spot on his feet and has a history of skin cancer.)    HPI:  Mr. Gerard Manuel is a(n) 85 year old male who presents today as a return patient for FBSE hx NMSC. Has scaling and callus on his feet. Has a brown stuck on spot which sometimes get inflamed and caught on things. Patient is otherwise feeling well, without additional skin concerns.  NO new areas of nonhealing sores or bleeding bumps.    Labs Reviewed:  N/A    Physical Exam:  Vitals: There were no vitals taken for this visit.  SKIN: Full skin, which includes the head/face, both arms, chest, back, abdomen,both legs, genitalia and/or groin buttocks, digits and/or nails, was examined.  - Innumerable flat brown and waxy brown black papules and plaques - erythematous base to lesion on right arm  -Homogenous tan macules on sun exposed skin  -Scattered bright red domed papules on trunk and extremities  -Well-circumscribed fleshy papules w/o concerning dermatoscopic or clinical features  -Callus on left foot  -Prior site(s) of skin cancer surgery well-healed without evidence of recurrence  -Thickened and yellow discolored nails on bilateral first toe - interdigital and moccasin patterned scale on bilateral feet  - No other lesions of concern on areas examined.     Medications:  Current Outpatient Medications   Medication     aspirin 325 MG tablet      atenolol (TENORMIN) 25 MG tablet     econazole nitrate 1 % external cream     neomycin-polymyxin-dexamethasone (MAXITROL) 3.5-11890-4.1 ophthalmic ointment     simvastatin (ZOCOR) 40 MG tablet     rivaroxaban ANTICOAGULANT (XARELTO) 20 MG TABS tablet     No current facility-administered medications for this visit.      Past Medical History:   Patient Active Problem List   Diagnosis     Blepharitis     BCC (basal cell carcinoma), face     Inflamed seborrheic keratosis     Tinea pedis     History of skin cancer     Seborrheic keratoses, inflamed     Hypertrophic scar     Neoplasm of uncertain behavior of skin     Actinic keratosis     Screening exam for skin cancer     Multiple benign nevi     Seborrheic keratoses     Past Medical History:   Diagnosis Date     Arthritis      Atrial flutter (H)      Blepharitis      Cataracts, both eyes      Hypertension     on meds     MGD (meibomian gland dysfunction)      Pacemaker        CC Referred Self, MD  No address on file on close of this encounter.

## 2021-11-07 ENCOUNTER — HEALTH MAINTENANCE LETTER (OUTPATIENT)
Age: 86
End: 2021-11-07

## 2021-11-24 ENCOUNTER — OFFICE VISIT (OUTPATIENT)
Dept: ORTHOPEDICS | Facility: CLINIC | Age: 86
End: 2021-11-24
Payer: MEDICARE

## 2021-11-24 DIAGNOSIS — L84 CALLUS: ICD-10-CM

## 2021-11-24 DIAGNOSIS — I73.9 PVD (PERIPHERAL VASCULAR DISEASE) (H): ICD-10-CM

## 2021-11-24 DIAGNOSIS — I73.89 OTHER SPECIFIED PERIPHERAL VASCULAR DISEASES (H): Primary | ICD-10-CM

## 2021-11-24 PROCEDURE — 99203 OFFICE O/P NEW LOW 30 MIN: CPT | Performed by: PODIATRIST

## 2021-11-24 RX ORDER — APIXABAN 5 MG/1
TABLET, FILM COATED ORAL
COMMUNITY
Start: 2021-10-14

## 2021-11-24 NOTE — PROGRESS NOTES
Date of Service: 11/24/2021    Chief Complaint   Patient presents with     Consult     Callus, bilaterally. L>R. Patient stated that he has arthritis in his hands and possibly in his feet. Patient has an appointment with RA in early December. Thick nails that he sees dermatology.           HPI: Gerard is a 85 year old male who presents today for further evaluation of calluses. He relates that he has pain in the left foot in a callus. He did have pain in the right side of the fifth metatarsal head, however he relates that this is gotten better. He relates that he is starting to loosen fat padding in his feet. He has onychomycosis, however this is being managed by dermatology. He relates that when he wear shoes, in an outside of the house, he does not have pain in the calluses. He does have pain when he walks barefoot.    Review of Systems: No n/v/d/f/c/ns/sob/cp    PMH:   Past Medical History:   Diagnosis Date     Arthritis      Atrial flutter (H)      Blepharitis      Cataracts, both eyes      Hypertension     on meds     MGD (meibomian gland dysfunction)      Pacemaker        PSxH:   Past Surgical History:   Procedure Laterality Date     APPENDECTOMY       CARDIAC SURGERY      CABGx4     CATARACT IOL, RT/LT Left 07/09/2018     CATARACT IOL, RT/LT Right 07/23/2018     MOHS MICROGRAPHIC PROCEDURE       PHACOEMULSIFICATION CLEAR CORNEA WITH STANDARD INTRAOCULAR LENS IMPLANT Left 7/9/2018    Procedure: PHACOEMULSIFICATION CLEAR CORNEA WITH STANDARD INTRAOCULAR LENS IMPLANT;  LEFT EYE PHACOEMULSIFICATION CLEAR CORNEA WITH STANDARD INTRAOCULAR LENS IMPLANT ;  Surgeon: Gerardo Peña MD;  Location: [facility]     PHACOEMULSIFICATION CLEAR CORNEA WITH STANDARD INTRAOCULAR LENS IMPLANT Right 7/23/2018    Procedure: PHACOEMULSIFICATION CLEAR CORNEA WITH STANDARD INTRAOCULAR LENS IMPLANT;  RIGHT EYE PHACOEMULSIFICATION CLEAR CORNEA WITH STANDARD INTRAOCULAR LENS IMPLANT ;  Surgeon: Gerardo Peña MD;  Location: [facility]      Quadruple Bypass Surgery  1992       Allergies: Patient has no known allergies.    SH:   Social History     Socioeconomic History     Marital status:      Spouse name: Not on file     Number of children: Not on file     Years of education: Not on file     Highest education level: Not on file   Occupational History     Not on file   Tobacco Use     Smoking status: Never Smoker     Smokeless tobacco: Never Used   Substance and Sexual Activity     Alcohol use: No     Drug use: No     Sexual activity: Not on file   Other Topics Concern     Parent/sibling w/ CABG, MI or angioplasty before 65F 55M? Not Asked   Social History Narrative     Not on file     Social Determinants of Health     Financial Resource Strain: Not on file   Food Insecurity: Not on file   Transportation Needs: Not on file   Physical Activity: Not on file   Stress: Not on file   Social Connections: Not on file   Intimate Partner Violence: Not on file   Housing Stability: Not on file       FH:   Family History   Problem Relation Age of Onset     Cancer Brother         skin cancer     Skin Cancer Brother      Skin Cancer Sister      Glaucoma No family hx of      Macular Degeneration No family hx of        Objective:  Data Unavailable Data Unavailable Data Unavailable Data Unavailable Data Unavailable 0 lbs 0 oz    PT pulses are not palpable secondary to +1 pitting edema and DP pulses are 2/4 bilaterally. CRT is 1 second. Diminished pedal hair. Telangiectasia and spider veins are noted to bilateral feet, ankles, and legs. +1 pitting edema noted to ankles and legs.  Gross sensation is intact bilaterally.   Equinus is noted bilaterally. No pain with active or passive ROM of the ankle, MTJ, 1st ray, or halluces bilaterally,. Fat pad atrophy noted to the plantar foot around the fifth metatarsal tuberosity and the met heads.  Nails no mycotic rmal bilaterally. No open lesions are noted. Hyperkeratotic lesions are noted to bilateral fifth metatarsal  heads with left greater than right. Pain noted with palpation of the left.    Assessment: Gerard is an 85-year-old with PVD as noted in the vascular exam and tylomas x2. These do cause him pain.    Plan:  - Pt seen and evaluated.  -Tylomas debrided x2.  -We did discuss getting custom orthotics, however these are not covered by Medicare. These would cost around 400 hallux, however [facility] I have asked him to talk to his primary doctor about [facility], as they may be covered.  -Start urea cream once daily 40% to the calluses.  -See again in 3 months.

## 2021-11-24 NOTE — LETTER
11/24/2021     RE: Gerard Manuel  [address]   [place] [address]      Dear Colleague,    Thank you for referring your patient, Gerard Manuel, to the [facility]. Please see a copy of my visit note below.    Date of Service: 11/24/2021    Chief Complaint   Patient presents with     Consult     Callus, bilaterally. L>R. Patient stated that he has arthritis in his hands and possibly in his feet. Patient has an appointment with RA in early December. Thick nails that he sees dermatology.        HPI: Gerard is a 85 year old male who presents today for further evaluation of calluses. He relates that he has pain in the left foot in a callus. He did have pain in the right side of the fifth metatarsal head, however he relates that this is gotten better. He relates that he is starting to loosen fat padding in his feet. He has onychomycosis, however this is being managed by dermatology. He relates that when he wear shoes, in an outside of the house, he does not have pain in the calluses. He does have pain when he walks barefoot.    Review of Systems: No n/v/d/f/c/ns/sob/cp    PMH:   Past Medical History:   Diagnosis Date     Arthritis      Atrial flutter (H)      Blepharitis      Cataracts, both eyes      Hypertension     on meds     MGD (meibomian gland dysfunction)      Pacemaker      PSxH:   Past Surgical History:   Procedure Laterality Date     APPENDECTOMY       CARDIAC SURGERY      CABGx4     CATARACT IOL, RT/LT Left 07/09/2018     CATARACT IOL, RT/LT Right 07/23/2018     MOHS MICROGRAPHIC PROCEDURE       PHACOEMULSIFICATION CLEAR CORNEA WITH STANDARD INTRAOCULAR LENS IMPLANT Left 7/9/2018    Procedure: PHACOEMULSIFICATION CLEAR CORNEA WITH STANDARD INTRAOCULAR LENS IMPLANT;  LEFT EYE PHACOEMULSIFICATION CLEAR CORNEA WITH STANDARD INTRAOCULAR LENS IMPLANT ;  Surgeon: Gerardo Peña MD;  Location: [facility]     PHACOEMULSIFICATION CLEAR CORNEA WITH STANDARD  INTRAOCULAR LENS IMPLANT Right 7/23/2018    Procedure: PHACOEMULSIFICATION CLEAR CORNEA WITH STANDARD INTRAOCULAR LENS IMPLANT;  RIGHT EYE PHACOEMULSIFICATION CLEAR CORNEA WITH STANDARD INTRAOCULAR LENS IMPLANT ;  Surgeon: Gerardo Peña MD;  [facility]     Quadruple Bypass Surgery  1992     Allergies: Patient has no known allergies.    SH:   Social History     Socioeconomic History     Marital status:      Spouse name: Not on file     Number of children: Not on file     Years of education: Not on file     Highest education level: Not on file   Occupational History     Not on file   Tobacco Use     Smoking status: Never Smoker     Smokeless tobacco: Never Used   Substance and Sexual Activity     Alcohol use: No     Drug use: No     Sexual activity: Not on file   Other Topics Concern     Parent/sibling w/ CABG, MI or angioplasty before 65F 55M? Not Asked   Social History Narrative     Not on file     Social Determinants of Health     Financial Resource Strain: Not on file   Food Insecurity: Not on file   Transportation Needs: Not on file   Physical Activity: Not on file   Stress: Not on file   Social Connections: Not on file   Intimate Partner Violence: Not on file   Housing Stability: Not on file       FH:   Family History   Problem Relation Age of Onset     Cancer Brother         skin cancer     Skin Cancer Brother      Skin Cancer Sister      Glaucoma No family hx of      Macular Degeneration No family hx of      Objective:  Data Unavailable Data Unavailable Data Unavailable Data Unavailable Data Unavailable 0 lbs 0 oz    PT pulses are not palpable secondary to +1 pitting edema and DP pulses are 2/4 bilaterally. CRT is 1 second. Diminished pedal hair. Telangiectasia and spider veins are noted to bilateral feet, ankles, and legs. +1 pitting edema noted to ankles and legs.  Gross sensation is intact bilaterally.   Equinus is noted bilaterally. No pain with active or passive ROM of the ankle, MTJ,  1st ray, or halluces bilaterally,. Fat pad atrophy noted to the plantar foot around the fifth metatarsal tuberosity and the met heads.  Nails no mycotic rmal bilaterally. No open lesions are noted. Hyperkeratotic lesions are noted to bilateral fifth metatarsal heads with left greater than right. Pain noted with palpation of the left.    Assessment: Gerard is an 85-year-old with PVD as noted in the vascular exam and tylomas x2. These do cause him pain.    Plan:  - Pt seen and evaluated.  -Tylomas debrided x2.  -We did discuss getting custom orthotics, however these are not covered by Medicare. These would cost around 400 hallux, however [facility] I have asked him to talk to his primary doctor about [facility], as they may be covered.  -Start urea cream once daily 40% to the calluses.  -See again in 3 months.           Anam Pandya DPM

## 2021-11-24 NOTE — NURSING NOTE
Reason For Visit:   Chief Complaint   Patient presents with     Consult     Callus, bilaterally. L>R. Patient stated that he has arthritis in his hands and possibly in his feet. Patient has an appointment with RA in early December. Thick nails that he sees dermatology.        Pain Assessment  Patient Currently in Pain: Denies        No Known Allergies        Melanie Guzman LPN

## 2022-03-07 ENCOUNTER — TELEPHONE (OUTPATIENT)
Dept: OPHTHALMOLOGY | Facility: CLINIC | Age: 87
End: 2022-03-07
Payer: MEDICARE

## 2022-03-07 NOTE — TELEPHONE ENCOUNTER
Spoke to pt at 0837    Left eye lower lid stye.    Been visible times 1 month and been warm soaking and had some improvement.    Pt on vacation and maybe not using warm soaks as often and stye seems to be more red and reoccurring again.    No pain  Some itching    No swelling around eye (localized on lid)    Scheduled Monday with Dr. Matthews at [facility] location and encouraged warm soaks 4/day if able and to call for any worsening symptoms/swelling around eye    Pt seemed comfortable with plan    Per Wilson RN 8:47 AM 03/08/22             [facility] Call Center    Phone Message    May a detailed message be left on voicemail: yes     Reason for Call: Other: Pt called stating he has a painful stye on his L eye. Please call to discuss. Thank you     Action Taken: Message routed to:  [facility] Eye    Travel Screening: Not Applicable

## 2022-03-14 ENCOUNTER — OFFICE VISIT (OUTPATIENT)
Dept: OPTOMETRY | Facility: CLINIC | Age: 87
End: 2022-03-14
Payer: MEDICARE

## 2022-03-14 DIAGNOSIS — H00.15 CHALAZION LEFT LOWER EYELID: Primary | ICD-10-CM

## 2022-03-14 DIAGNOSIS — H01.02B SQUAMOUS BLEPHARITIS OF UPPER AND LOWER EYELIDS OF BOTH EYES: ICD-10-CM

## 2022-03-14 DIAGNOSIS — H01.02A SQUAMOUS BLEPHARITIS OF UPPER AND LOWER EYELIDS OF BOTH EYES: ICD-10-CM

## 2022-03-14 RX ORDER — NEOMYCIN SULFATE, POLYMYXIN B SULFATE, AND DEXAMETHASONE 3.5; 10000; 1 MG/G; [USP'U]/G; MG/G
0.5 OINTMENT OPHTHALMIC 2 TIMES DAILY
Qty: 3.5 G | Refills: 1 | Status: SHIPPED | OUTPATIENT
Start: 2022-03-14 | End: 2023-12-11

## 2022-03-14 ASSESSMENT — VISUAL ACUITY
OD_PH_SC: 20/25
OD_SC: 20/30
METHOD: SNELLEN - LINEAR
OS_SC: 20/20

## 2022-03-14 ASSESSMENT — TONOMETRY
IOP_METHOD: ICARE
OS_IOP_MMHG: 12
OD_IOP_MMHG: 11

## 2022-03-14 ASSESSMENT — EXTERNAL EXAM - LEFT EYE: OS_EXAM: NORMAL

## 2022-03-14 ASSESSMENT — EXTERNAL EXAM - RIGHT EYE: OD_EXAM: NORMAL

## 2022-03-14 NOTE — PATIENT INSTRUCTIONS
Lid scrubs: These can help prevent buildup of debris on eyelids/eyelashes and help reduce inflammation of the eyelids (blepharitis). Use daily.  -Ocusoft Plus lid wipes  -Ocusoft Plus Hypochlor foaming lid cleanser  -Systane lid wipes  -Tranquileyes 1% Tea Tree Eyelid & Facial Cleanser by [employer]  -Avenova Lid   -Cliradex Lid Wipes  -We Love Eyes Foaming Tea Tree Cleanser  -Oasis-LID  N LASH pads.       Warm compresses: Use 1-2x/day for 5-10 minutes over closed eyelids  -Dayan mask  -Tranquileyes beaded mask  -Mibo heating pad  -[place] REST & RELIEF Eye Mask (Hot or Cold)  -I-RELIEF Therapy Mask  -OcuTherm Essentials Kit    You can also use a warm wet washcloth - however this frequently loses heat quickly and can dry your skin out a bit so we recommend any of the above re-usable beaded/gel eyemasks      To purchase these products you can look over-the-counter at [employer] or purchase online at the following websites:  -[email or web address]  -[email or web address]

## 2022-03-14 NOTE — PROGRESS NOTES
A/P  1.) Chalazion left eye, Blepharitis OU  -Blepharitis longstanding. Recent stye/chalazion left eye that has been improving since January but still persistent  -Slow steady improvement with warm compresses but has stalled recently  -Previously did well with Maxitrol ointment. Start left lid bid until resolved  -Continue warm compresses, start lid scrubs (list given)    F/u prn if symptoms worsen or do not improve. If inflammation improves but persistent chalazion can perhaps refer to oculoplastics for excision.    I have confirmed the patient's CC, HPI and reviewed Past Medical History, Past Surgical History, Social History, Family History, Problem List, Medication List and agree with Tech note.     Shasta Matthews, KARRIE FAAO FSLS

## 2022-03-15 ENCOUNTER — OFFICE VISIT (OUTPATIENT)
Dept: ORTHOPEDICS | Facility: CLINIC | Age: 87
End: 2022-03-15
Payer: MEDICARE

## 2022-03-15 DIAGNOSIS — I73.9 PVD (PERIPHERAL VASCULAR DISEASE) (H): ICD-10-CM

## 2022-03-15 DIAGNOSIS — L84 CALLUS: Primary | ICD-10-CM

## 2022-03-15 DIAGNOSIS — I73.89 OTHER SPECIFIED PERIPHERAL VASCULAR DISEASES (H): ICD-10-CM

## 2022-03-15 PROCEDURE — 99213 OFFICE O/P EST LOW 20 MIN: CPT | Performed by: PODIATRIST

## 2022-03-15 NOTE — PROGRESS NOTES
Date of Service: 3/15/22    Chief Complaint   Patient presents with     Follow Up     3 month follow up.           HPI: Gerard is a 86 year old male who presents today for further evaluation of calluses.  He no longer gets pain from the calluses.  He relates that the left debridement did help him.  He relates [facility]  These were helpful for him.  He has been using a pumice on the calluses.    Review of Systems: No n/v/d/f/c/ns/sob/cp    PMH:   Past Medical History:   Diagnosis Date     Arthritis      Atrial flutter (H)      Blepharitis      Cataracts, both eyes      Hypertension     on meds     MGD (meibomian gland dysfunction)      Pacemaker        PSxH:   Past Surgical History:   Procedure Laterality Date     APPENDECTOMY       CARDIAC SURGERY      CABGx4     CATARACT IOL, RT/LT Left 07/09/2018     CATARACT IOL, RT/LT Right 07/23/2018     MOHS MICROGRAPHIC PROCEDURE       PHACOEMULSIFICATION CLEAR CORNEA WITH STANDARD INTRAOCULAR LENS IMPLANT Left 7/9/2018    Procedure: PHACOEMULSIFICATION CLEAR CORNEA WITH STANDARD INTRAOCULAR LENS IMPLANT;  LEFT EYE PHACOEMULSIFICATION CLEAR CORNEA WITH STANDARD INTRAOCULAR LENS IMPLANT ;  Surgeon: Gerardo Peña MD;  [facility]     PHACOEMULSIFICATION CLEAR CORNEA WITH STANDARD INTRAOCULAR LENS IMPLANT Right 7/23/2018    Procedure: PHACOEMULSIFICATION CLEAR CORNEA WITH STANDARD INTRAOCULAR LENS IMPLANT;  RIGHT EYE PHACOEMULSIFICATION CLEAR CORNEA WITH STANDARD INTRAOCULAR LENS IMPLANT ;  Surgeon: Gerardo Peña MD;  [facility]     Quadruple Bypass Surgery  1992       Allergies: Patient has no known allergies.    SH:   Social History     Socioeconomic History     Marital status:      Spouse name: Not on file     Number of children: Not on file     Years of education: Not on file     Highest education level: Not on file   Occupational History     Not on file   Tobacco Use     Smoking status: Never Smoker     Smokeless  tobacco: Never Used   Substance and Sexual Activity     Alcohol use: No     Drug use: No     Sexual activity: Not on file   Other Topics Concern     Parent/sibling w/ CABG, MI or angioplasty before 65F 55M? Not Asked   Social History Narrative     Not on file     Social Determinants of Health     Financial Resource Strain: Not on file   Food Insecurity: Not on file   Transportation Needs: Not on file   Physical Activity: Not on file   Stress: Not on file   Social Connections: Not on file   Intimate Partner Violence: Not on file   Housing Stability: Not on file       FH:   Family History   Problem Relation Age of Onset     Cancer Brother         skin cancer     Skin Cancer Brother      Skin Cancer Sister      Glaucoma No family hx of      Macular Degeneration No family hx of        Objective:  Data Unavailable Data Unavailable Data Unavailable Data Unavailable Data Unavailable 0 lbs 0 oz    PT pulses are not palpable secondary to +1 pitting edema and DP pulses are 2/4 bilaterally. CRT is 1 second. Diminished pedal hair. Telangiectasia and spider veins are noted to bilateral feet, ankles, and legs. +1 pitting edema noted to ankles and legs.  Gross sensation is intact bilaterally.   Equinus is noted bilaterally. No pain with active or passive ROM of the ankle, MTJ, 1st ray, or halluces bilaterally,. Fat pad atrophy noted to the plantar foot around the fifth metatarsal tuberosity and the met heads.  Nails no mycotic rmal bilaterally. No open lesions are noted. Hyperkeratotic lesions are noted to bilateral fifth metatarsal heads with left greater than right.  No pain noted with palpation of the left.  Vesicular rash on a red base in the distribution of a no-show sock to bilateral feet.    Assessment: Gerard is an 85-year-old with PVD as noted in the vascular exam and tylomas x2.  With his current therapies, he no longer is having pain in the calluses.  I recommended that he continue using a pumice stone to the area.  He  can also continue using the urea cream.  He does have some tinea pedis.  He has been prescribed econazole by Dr. Stone in the past.  Recommend that he start this again.    Plan:  - Pt seen and evaluated.  -Tylomas debrided x1.  -Start the econazole again.  -See again PRN

## 2022-03-15 NOTE — LETTER
3/15/2022         RE: Gerard Manuel  [address] [place] [address]        Dear Colleague,    Thank you for referring your patient, Gerard Manuel, to the [facility] [place]. Please see a copy of my visit note below.    Date of Service: 3/15/22    Chief Complaint   Patient presents with     Follow Up     3 month follow up.           HPI: Gerard is a 86 year old male who presents today for further evaluation of calluses.  He no longer gets pain from the calluses.  He relates that the left debridement did help him.  [facility]  [facility] were [facility] for him.  He has been using a pumice on the calluses.    Review of Systems: No n/v/d/f/c/ns/sob/cp    PMH:   Past Medical History:   Diagnosis Date     Arthritis      Atrial flutter (H)      Blepharitis      Cataracts, both eyes      Hypertension     on meds     MGD (meibomian gland dysfunction)      Pacemaker        PSxH:   Past Surgical History:   Procedure Laterality Date     APPENDECTOMY       CARDIAC SURGERY      CABGx4     CATARACT IOL, RT/LT Left 07/09/2018     CATARACT IOL, RT/LT Right 07/23/2018     MOHS MICROGRAPHIC PROCEDURE       PHACOEMULSIFICATION CLEAR CORNEA WITH STANDARD INTRAOCULAR LENS IMPLANT Left 7/9/2018    Procedure: PHACOEMULSIFICATION CLEAR CORNEA WITH STANDARD INTRAOCULAR LENS IMPLANT;  LEFT EYE PHACOEMULSIFICATION CLEAR CORNEA WITH STANDARD INTRAOCULAR LENS IMPLANT ;  Surgeon: Gerardo Peña MD;  [facility]     PHACOEMULSIFICATION CLEAR CORNEA WITH STANDARD INTRAOCULAR LENS IMPLANT Right 7/23/2018    Procedure: PHACOEMULSIFICATION CLEAR CORNEA WITH STANDARD INTRAOCULAR LENS IMPLANT;  RIGHT EYE PHACOEMULSIFICATION CLEAR CORNEA WITH STANDARD INTRAOCULAR LENS IMPLANT ;  Surgeon: Gerardo Peña MD;  Location:  [facility]     Quadruple Bypass Surgery  1992       Allergies: Patient has no known allergies.    SH:   Social History     Socioeconomic  History     Marital status:      Spouse name: Not on file     Number of children: Not on file     Years of education: Not on file     Highest education level: Not on file   Occupational History     Not on file   Tobacco Use     Smoking status: Never Smoker     Smokeless tobacco: Never Used   Substance and Sexual Activity     Alcohol use: No     Drug use: No     Sexual activity: Not on file   Other Topics Concern     Parent/sibling w/ CABG, MI or angioplasty before 65F 55M? Not Asked   Social History Narrative     Not on file     Social Determinants of Health     Financial Resource Strain: Not on file   Food Insecurity: Not on file   Transportation Needs: Not on file   Physical Activity: Not on file   Stress: Not on file   Social Connections: Not on file   Intimate Partner Violence: Not on file   Housing Stability: Not on file       FH:   Family History   Problem Relation Age of Onset     Cancer Brother         skin cancer     Skin Cancer Brother      Skin Cancer Sister      Glaucoma No family hx of      Macular Degeneration No family hx of        Objective:  Data Unavailable Data Unavailable Data Unavailable Data Unavailable Data Unavailable 0 lbs 0 oz    PT pulses are not palpable secondary to +1 pitting edema and DP pulses are 2/4 bilaterally. CRT is 1 second. Diminished pedal hair. Telangiectasia and spider veins are noted to bilateral feet, ankles, and legs. +1 pitting edema noted to ankles and legs.  Gross sensation is intact bilaterally.   Equinus is noted bilaterally. No pain with active or passive ROM of the ankle, MTJ, 1st ray, or halluces bilaterally,. Fat pad atrophy noted to the plantar foot around the fifth metatarsal tuberosity and the met heads.  Nails no mycotic rmal bilaterally. No open lesions are noted. Hyperkeratotic lesions are noted to bilateral fifth metatarsal heads with left greater than right.  No pain noted with palpation of the left.  Vesicular rash on a red base in the  distribution of a no-show sock to bilateral feet.    Assessment: Gerard is an 85-year-old with PVD as noted in the vascular exam and tylomas x2.  With his current therapies, he no longer is having pain in the calluses.  I recommended that he continue using a pumice stone to the area.  He can also continue using the urea cream.  He does have some tinea pedis.  He has been prescribed econazole by Dr. Stone in the past.  Recommend that he start this again.    Plan:  - Pt seen and evaluated.  -Tylomas debrided x1.  -Start the econazole again.  -See again PRN         Anam Pandya, BEATRIZM

## 2022-07-06 ENCOUNTER — TELEPHONE (OUTPATIENT)
Dept: DERMATOLOGY | Facility: CLINIC | Age: 87
End: 2022-07-06

## 2022-07-06 NOTE — TELEPHONE ENCOUNTER
Attempt to call patient to schedule annual skin check follow up in the Dermatology Clinic per Dr tSone last visit on 10/05/21 checkout comment dispositions. Left message with Dermatology number for patient to call back to schedule. Send letter.

## 2022-09-13 ENCOUNTER — OFFICE VISIT (OUTPATIENT)
Dept: DERMATOLOGY | Facility: CLINIC | Age: 87
End: 2022-09-13
Payer: MEDICARE

## 2022-09-13 DIAGNOSIS — Z85.828 HISTORY OF SKIN CANCER: ICD-10-CM

## 2022-09-13 DIAGNOSIS — L82.1 SEBORRHEIC KERATOSIS: Primary | ICD-10-CM

## 2022-09-13 DIAGNOSIS — L57.0 ACTINIC KERATOSIS: ICD-10-CM

## 2022-09-13 DIAGNOSIS — D22.9 MULTIPLE BENIGN NEVI: ICD-10-CM

## 2022-09-13 DIAGNOSIS — D18.01 CHERRY ANGIOMA: ICD-10-CM

## 2022-09-13 DIAGNOSIS — L81.4 SOLAR LENTIGINOSIS: ICD-10-CM

## 2022-09-13 PROCEDURE — 17003 DESTRUCT PREMALG LES 2-14: CPT | Mod: GC | Performed by: STUDENT IN AN ORGANIZED HEALTH CARE EDUCATION/TRAINING PROGRAM

## 2022-09-13 PROCEDURE — 99213 OFFICE O/P EST LOW 20 MIN: CPT | Mod: 25 | Performed by: STUDENT IN AN ORGANIZED HEALTH CARE EDUCATION/TRAINING PROGRAM

## 2022-09-13 PROCEDURE — 17000 DESTRUCT PREMALG LESION: CPT | Mod: GC | Performed by: STUDENT IN AN ORGANIZED HEALTH CARE EDUCATION/TRAINING PROGRAM

## 2022-09-13 ASSESSMENT — PAIN SCALES - GENERAL: PAINLEVEL: NO PAIN (0)

## 2022-09-13 NOTE — LETTER
"9/13/2022       RE: Gerard Manuel  [address] [place] [address]     Dear Colleague,    Thank you for referring your patient, Gerard Manuel, to the [facility] [place] at [facility]. Please see a copy of my visit note below.    [facility] Dermatology Note  Encounter Date: Sep 13, 2022  Office Visit     Dermatology Problem List:  FBSE: 10/5/2021  # NMSC  - BCC, \"right nose,\" s/p [facility] 2015   - SCC, left hand, s/p [facility] 2018   # AKs   # Tinea pedis   - Current treatment: econazole 1% cream BID   # Onychomycosis   # Senile purpura   - Recommended Vitamin C 500 mg BID and Rutoside 50 mg BID if okay with cardiologist  # Raynaud's phenomenon    ____________________________________________    Assessment & Plan:     # AKs, left superior helix, right zygoma  - LN x2    # H/o NMSC  - No evidence of recurrence  - Sun avoidance and protective strategies discussed including use of daily SPF 30+ sunscreen  - ABCDEs of melanoma and s/s of NMSC discussed    # Benign lesions (cherry angiomas, SKs, lentigines, clinically banal appearing melanocytic nevi)  - ABCDs of melanoma were discussed and self skin checks were advised.  - Signs and symptoms of NMSC discussed  - Sun precaution was advised including the use of sun screens of SPF 30 or higher, sun protective clothing, and avoidance of tanning beds.  - Ultimately reassurance provided and benign nature of condition discussed    Procedures Performed:   - Cryotherapy procedure note, location(s): left superior helix, right temple . After verbal consent and discussion of risks and benefits including, but not limited to, dyspigmentation/scar, blister, and pain, 2 lesion(s) was(were) treated with 1-2 mm freeze border for 1-2 cycles with liquid nitrogen. Post cryotherapy instructions were provided.      Follow-up: 1 year(s) in-person, or earlier for new or changing " lesions    Staff and Resident:     Bozena Montano MD     The patient was seen and staffed with Dr. CRYSTAL Serrano MD     ____________________________________________    CC: No chief complaint on file.    HPI:  Mr. Gerard Manuel is a(n) 86 year old male who presents today as a return patient for FBSE hx NMSC. Has some brown spots he is worried about. Patient is otherwise feeling well, without additional skin concerns.      Labs Reviewed:  N/A    Physical Exam:  Vitals: There were no vitals taken for this visit.  SKIN: Full skin, which includes the head/face, both arms, chest, back, abdomen,both legs, genitalia and/or groin buttocks, digits and/or nails, was examined.  - Multiple brown macules on the trunk and extremities, no concerning features   - Waxy brown stuck on papules on trunk and extremities   - Red dome shaped papules on trunk and extremities   - Light brown macules accenuated on sun exposed areas  - gritty papules on left superior helix and the right temple   - No other lesions of concern on areas examined.     Medications:  Current Outpatient Medications   Medication     aspirin 325 MG tablet     atenolol (TENORMIN) 25 MG tablet     econazole nitrate 1 % external cream     ELIQUIS ANTICOAGULANT 5 MG tablet     neomycin-polymyxin-dexamethasone (MAXITROL) 3.5-19969-8.1 ophthalmic ointment     neomycin-polymyxin-dexamethasone (MAXITROL) 3.5-79839-5.1 ophthalmic ointment     rivaroxaban ANTICOAGULANT (XARELTO) 20 MG TABS tablet     simvastatin (ZOCOR) 40 MG tablet     No current facility-administered medications for this visit.      Past Medical History:   Patient Active Problem List   Diagnosis     Blepharitis     BCC (basal cell carcinoma), face     Inflamed seborrheic keratosis     Tinea pedis     History of nonmelanoma skin cancer     Seborrheic keratoses, inflamed     Hypertrophic scar     Neoplasm of uncertain behavior of skin     Actinic keratosis     Screening exam for skin cancer     Multiple  benign nevi     Seborrheic keratoses     Past Medical History:   Diagnosis Date     Arthritis      Atrial flutter (H)      Blepharitis      Cataracts, both eyes      Hypertension     on meds     MGD (meibomian gland dysfunction)      Pacemaker        CC Referred Self, MD  No address on file on close of this encounter.    Attestation signed by Guanaco Serrano MD at 9/13/2022  2:17 PM:  I have personally examined this patient and agree with the resident doctor's documentation and plan of care. I have reviewed and amended the resident's note as necessary. I was present for key portions of any procedures performed.The documentation accurately reflects my clinical observations, diagnoses, treatment and follow-up plans.     Guanaco Serrano MD  Dermatology Staff

## 2022-09-13 NOTE — PROGRESS NOTES
"[facility] Dermatology Note  Encounter Date: Sep 13, 2022  Office Visit     Dermatology Problem List:  FBSE: 10/5/2021  # NMSC  - BCC, \"right nose,\" s/p [facility] 2015   - SCC, left hand, s/p [facility] 2018   # AKs   # Tinea pedis   - Current treatment: econazole 1% cream BID   # Onychomycosis   # Senile purpura   - Recommended Vitamin C 500 mg BID and Rutoside 50 mg BID if okay with cardiologist  # Raynaud's phenomenon    ____________________________________________    Assessment & Plan:     # AKs, left superior helix, right zygoma  - LN x2    # H/o NMSC  - No evidence of recurrence  - Sun avoidance and protective strategies discussed including use of daily SPF 30+ sunscreen  - ABCDEs of melanoma and s/s of NMSC discussed    # Benign lesions (cherry angiomas, SKs, lentigines, clinically banal appearing melanocytic nevi)  - ABCDs of melanoma were discussed and self skin checks were advised.  - Signs and symptoms of NMSC discussed  - Sun precaution was advised including the use of sun screens of SPF 30 or higher, sun protective clothing, and avoidance of tanning beds.  - Ultimately reassurance provided and benign nature of condition discussed    Procedures Performed:   - Cryotherapy procedure note, location(s): left superior helix, right temple . After verbal consent and discussion of risks and benefits including, but not limited to, dyspigmentation/scar, blister, and pain, 2 lesion(s) was(were) treated with 1-2 mm freeze border for 1-2 cycles with liquid nitrogen. Post cryotherapy instructions were provided.      Follow-up: 1 year(s) in-person, or earlier for new or changing lesions    Staff and Resident:     Bozena Montano MD     The patient was seen and staffed with Dr. CRYSTAL Serrano MD     ____________________________________________    CC: No chief complaint on file.    HPI:  Mr. Gerard Manuel is a(n) 86 year old male who presents today as a return patient for FBSE hx NMSC. Has some brown spots he is " worried about. Patient is otherwise feeling well, without additional skin concerns.      Labs Reviewed:  N/A    Physical Exam:  Vitals: There were no vitals taken for this visit.  SKIN: Full skin, which includes the head/face, both arms, chest, back, abdomen,both legs, genitalia and/or groin buttocks, digits and/or nails, was examined.  - Multiple brown macules on the trunk and extremities, no concerning features   - Waxy brown stuck on papules on trunk and extremities   - Red dome shaped papules on trunk and extremities   - Light brown macules accenuated on sun exposed areas  - gritty papules on left superior helix and the right temple   - No other lesions of concern on areas examined.     Medications:  Current Outpatient Medications   Medication     aspirin 325 MG tablet     atenolol (TENORMIN) 25 MG tablet     econazole nitrate 1 % external cream     ELIQUIS ANTICOAGULANT 5 MG tablet     neomycin-polymyxin-dexamethasone (MAXITROL) 3.5-92974-0.1 ophthalmic ointment     neomycin-polymyxin-dexamethasone (MAXITROL) 3.5-83955-4.1 ophthalmic ointment     rivaroxaban ANTICOAGULANT (XARELTO) 20 MG TABS tablet     simvastatin (ZOCOR) 40 MG tablet     No current facility-administered medications for this visit.      Past Medical History:   Patient Active Problem List   Diagnosis     Blepharitis     BCC (basal cell carcinoma), face     Inflamed seborrheic keratosis     Tinea pedis     History of nonmelanoma skin cancer     Seborrheic keratoses, inflamed     Hypertrophic scar     Neoplasm of uncertain behavior of skin     Actinic keratosis     Screening exam for skin cancer     Multiple benign nevi     Seborrheic keratoses     Past Medical History:   Diagnosis Date     Arthritis      Atrial flutter (H)      Blepharitis      Cataracts, both eyes      Hypertension     on meds     MGD (meibomian gland dysfunction)      Pacemaker        CC Referred Self, MD  No address on file on close of this encounter.

## 2022-09-13 NOTE — PATIENT INSTRUCTIONS
"Sun Protection    Recommend sunscreen (more brands also below):  - ISEMANUEL Eryfotona Actinica Mineral Sunscreen SPF 50+ Zinc Oxide  - ISEMANUEL Minear Brush SPF 50 (for re-application throughout the day)       Sunscreen   What does \"broad spectrum mean\"?  Broad spectrum sunscreens protect against both UVA and UVB radiation. UVC is filtered out by the ozone layer.     What does SPF mean?   SPF stands for  Sun Protection Factor  and represents the ability to screen only UVB (burning) rays. UVB rays are mostly blocked in all sunscreens, but only those that contain titanium dioxide, zinc oxide, mexoryl or Parsol 1789 (avobenzone) block the UVA spectrum. Even though a sunscreen is labeled  UVA/UVB Protection  that is not entirely accurate because products that only partially protect against UVA can claim to protect against both UVA and UVB.     What SPF should I chose?   Aim to get a sunscreen that is at least sun protection factor (SPF) 30. SPF 15 provides about 92-93% coverage, SPF 30 about 95-97% coverage, and SPF 45 about 98% coverage. That is to say, SPF 30 is not twice as good as SPF 15. The reason why we recommend SPF 30 is because we are usually only putting on half the necessary amount of sunscreen to achieve the advertised protection. That means that it is very possible that your SPF 30 sunscreen is only providing you with SPF 15 coverage based on how much you are applying. SPF 15 (92-93% coverage) is the absolute minimal that we recommend. Similarly, the benefit of sunscreens with SPF higher than 50 is that even if you put on less than the required amount, you are likely still getting good protection (ex: even if you apply only half the recommended amount of , it should still provide you with an SPF of 50).     How much should I apply?  If covering your whole body, you should be using 30 grams, or one ounce, which is how much is in one shot glass! That s a THICK layer! May times, you are only applying half " the recommended amount, which means that you are only getting half the SPF (for example, you may be using SPF 30 but if you're only applying half the recommended amount, you're only getting SPF 15 protection.      When do I need to wear sunscreen?  Every day, rain or shine! Even on a rainy day or a day when you are only indoors, you are still being exposed harmful UV radiation from the sun. We usually recommend physical/mineral sunscreens (active ingredient is titanium dioxide or zinc oxide) as these ingredients have been around for many years, there is no concern of them being absorbed into the bloodstream, and they are coral reef friendly! However, the best sunscreen is the one that you will use everyday.     What about my kids?  Sunscreen is not recommended for infants under the age of 6 months. Use clothing, shade and sun avoidance for small infants. For kids older than 6 months, we recommend that you should use only mineral/physical sunscreens that have zinc oxide as the active ingredient. Sun-protective clothing and hats are also important for people of all ages.     Sun protective clothing and Resources   Coolibar (www.coolibar.Wireless Glue Networks)  Wealth Access (Trustlook)  Athleta (wwwKluster)  CMP Therapeutics (wwwTicket ABC)  Carve Designs (Tibersoft) - affordable  Skinz (Omerosskinz.com)    Long sleeve - Rosa Cool DRI UPF 50 or [place] PFG UPF 50  Hoodie - [place] PFG UPF 50  Swimshirt/Rash Guard - Sheela UPF 50 (on Amazon)  Neck - Outdoor Research Ubertubes (www.outdoorresearch.com)      Do I need tinted sunscreen?  There is more and more research showing that visible light can also lead to discoloration (such as melasma). Tinted sunscreens (which contain iron oxides) protect against visible light as an added bonus.     What brands do you recommend?  Physical/Mineral Sunscreens (in no particular order)  Elta MD UV Physical Broad-Spectrum SPF 41 Sunscreen (Tinted, $33)  Skin Ceuticals Physical Fusion UV  "Defense SPF 50 (Tinted, $34)  Unsun Mineral Tinted Face Sunscreen (Tinted, with 2 shade ranges, $29)  It Cosmetics CC+ Cream with SPF 50+ (Tinted, can also double as foundation/coverage -- great range of shades, $40)  Biossance Squalane + Zinc Sheer Mineral Sunscreen SPF 30 PA +++ (goes on white then blends in, $30)  Cerave 100% Mineral Sunscreen SPF 50 Face (good for sensitive skin, $15)  La Roche Posay Anthelios Mineral Zinc Oxide Sunscreen SPF 50 ($35)  La Roche Posay Anthelios Mineral Tinted Sunscreen for Face SPF 50 ($35)  Think Sport Sunscreen (great for sports, though has more of a white cast, $20)  Think Baby Sunscreen (for kids, $21)  Color Science Sunforgettable Total Protection Brush On Shield SPF 50 (Multiple tints, $130)    Chemical Sunscreens  Leanne Rouleau Weightless Protection SPF 30 ($48)  Delia SPF Brightening Moisturizer ($30)  Urban Skin Complexion Protection Moisturizer SPF 30 ($20)  Total Defense + Repair Broad Spectrum SPF 34 ($68)  Clarins UV PLUS Anti-Pollution Sunscreen Multi-Protection Tint SPF 50 (Multiple tints, $45)  Neutrogena Healthy Skin Glow Sheers Tinted Moisturizer with SPF 20 (Multiple tints, $11)    The ABCDEs of Melanoma  Skin cancer can develop anywhere on the skin. Once a month, take a look at your entire body and note any changing moles or spots. Ask someone for help when checking your skin, especially for hard to see places such as your back. If you notice a mole that looks different from others, or one that changes, enlarges, itches, or bleeds, you should see a dermatologist.    Asymmetry, Border (irregularity), Color (not uniform, changes in color), Diameter (greater than 6 mm which is about the size of a pencil eraser), and Evolving (any changes in pre-existing moles). In short, look for the \"ugly duckling.\" You want all of the spots on your body to look like cousins (like they could be related). If something stands out, take a photo of it and make an appointment to " have it evaluated.     Suggested supplement:   - Heliocare - claims to maintain the skin's ability to protect itself against sun-related effects and aging.   - Not a replacement for sunscreen! Should be used in addition to sunscreen and sun protective measures such as hats, etc.  - Usually available online and at major retailers such as [employer], etc.

## 2022-11-19 ENCOUNTER — HEALTH MAINTENANCE LETTER (OUTPATIENT)
Age: 87
End: 2022-11-19

## 2023-03-27 ENCOUNTER — OFFICE VISIT (OUTPATIENT)
Dept: ORTHOPEDICS | Facility: CLINIC | Age: 88
End: 2023-03-27
Payer: MEDICARE

## 2023-03-27 DIAGNOSIS — I73.89 OTHER SPECIFIED PERIPHERAL VASCULAR DISEASES (H): ICD-10-CM

## 2023-03-27 DIAGNOSIS — M21.6X2 ACQUIRED EQUINUS DEFORMITY OF BOTH FEET: ICD-10-CM

## 2023-03-27 DIAGNOSIS — L84 CALLUS: Primary | ICD-10-CM

## 2023-03-27 DIAGNOSIS — L90.9 FAT PAD ATROPHY OF FOOT: ICD-10-CM

## 2023-03-27 DIAGNOSIS — M21.6X1 ACQUIRED EQUINUS DEFORMITY OF BOTH FEET: ICD-10-CM

## 2023-03-27 DIAGNOSIS — I73.9 PVD (PERIPHERAL VASCULAR DISEASE) (H): ICD-10-CM

## 2023-03-27 PROCEDURE — 11055 PARING/CUTG B9 HYPRKER LES 1: CPT | Mod: Q8 | Performed by: PODIATRIST

## 2023-03-27 PROCEDURE — 99213 OFFICE O/P EST LOW 20 MIN: CPT | Mod: 25 | Performed by: PODIATRIST

## 2023-03-27 NOTE — PROGRESS NOTES
Chief Complaint   Patient presents with     Follow Up     Rx for orthotics.             HPI: Gerard is a 87 year old male who presents today for further evaluation of calluses.  He no longer gets pain from the calluses.  He relates that the left debridement did help him.  He relates that he did get orthotics from [facility] and would like another pair.  These were helpful for him.  He has been using a pumice on the calluses.    Review of Systems: No n/v/d/f/c/ns/sob/cp    PMH:   Past Medical History:   Diagnosis Date     Arthritis      Atrial flutter (H)      Blepharitis      Cataracts, both eyes      Hypertension     on meds     MGD (meibomian gland dysfunction)      Pacemaker        PSxH:   Past Surgical History:   Procedure Laterality Date     APPENDECTOMY       CARDIAC SURGERY      CABGx4     CATARACT IOL, RT/LT Left 07/09/2018     CATARACT IOL, RT/LT Right 07/23/2018     MOHS MICROGRAPHIC PROCEDURE       PHACOEMULSIFICATION CLEAR CORNEA WITH STANDARD INTRAOCULAR LENS IMPLANT Left 7/9/2018    Procedure: PHACOEMULSIFICATION CLEAR CORNEA WITH STANDARD INTRAOCULAR LENS IMPLANT;  LEFT EYE PHACOEMULSIFICATION CLEAR CORNEA WITH STANDARD INTRAOCULAR LENS IMPLANT ;  Surgeon: Gerardo Peña MD;  Location: [facility]     PHACOEMULSIFICATION CLEAR CORNEA WITH STANDARD INTRAOCULAR LENS IMPLANT Right 7/23/2018    Procedure: PHACOEMULSIFICATION CLEAR CORNEA WITH STANDARD INTRAOCULAR LENS IMPLANT;  RIGHT EYE PHACOEMULSIFICATION CLEAR CORNEA WITH STANDARD INTRAOCULAR LENS IMPLANT ;  Surgeon: Gerardo Peña MD;  Location:  [facility]     Quadruple Bypass Surgery  1992       Allergies: Patient has no known allergies.    SH:   Social History     Socioeconomic History     Marital status:      Spouse name: Not on file     Number of children: Not on file     Years of education: Not on file     Highest education level: Not on file   Occupational History     Not on file   Tobacco Use     Smoking status: Never     Smokeless  tobacco: Never   Substance and Sexual Activity     Alcohol use: No     Drug use: No     Sexual activity: Not on file   Other Topics Concern     Parent/sibling w/ CABG, MI or angioplasty before 65F 55M? Not Asked   Social History Narrative     Not on file     Social Determinants of Health     Financial Resource Strain: Not on file   Food Insecurity: Not on file   Transportation Needs: Not on file   Physical Activity: Not on file   Stress: Not on file   Social Connections: Not on file   Intimate Partner Violence: Not on file   Housing Stability: Not on file       FH:   Family History   Problem Relation Age of Onset     Cancer Brother         skin cancer     Skin Cancer Brother      Skin Cancer Sister      Glaucoma No family hx of      Macular Degeneration No family hx of      Melanoma No family hx of        Objective:  Data Unavailable Data Unavailable Data Unavailable Data Unavailable Data Unavailable 0 lbs 0 oz    PT pulses are not palpable secondary to +1 pitting edema and DP pulses are 2/4 bilaterally. CRT is 1 second. Diminished pedal hair. Telangiectasia and spider veins are noted to bilateral feet, ankles, and legs. +1 pitting edema noted to ankles and legs.  Gross sensation is intact bilaterally.   Equinus is noted bilaterally. No pain with active or passive ROM of the ankle, MTJ, 1st ray, or halluces bilaterally,. Fat pad atrophy noted to the plantar foot around the fifth metatarsal tuberosity and the met heads.  Nails no mycotic rmal bilaterally. No open lesions are noted. Hyperkeratotic lesions are noted to bilateral fifth metatarsal heads with left greater than right.  No pain noted with palpation of the left.      Assessment: Gerard is an 87-year-old with PVD as noted in the vascular exam and tylomas x2.  With his current therapies, he no longer is having pain in the calluses.  I recommended that he continue using a pumice stone to the area.  He can also continue using the urea cream.     Plan:  - Pt seen  and evaluated.  -Tylomas debrided x1.  - Order written for new orthotics.   -See again PRN

## 2023-03-27 NOTE — LETTER
3/27/2023         RE: Gerard Manuel  [address] [place] [address]        Dear Colleague,    Thank you for referring your patient, Gerard Manuel, to the [facility] [place]. Please see a copy of my visit note below.    Chief Complaint   Patient presents with     Follow Up     Rx for orthotics.             HPI: Gerard is a 87 year old male who presents today for further evaluation of calluses.  He no longer gets pain from the calluses.  He relates that the left debridement did help him.  He relates that he did get orthotics from [facility] and would like another pair.  These were helpful for him.  He has been using a pumice on the calluses.    Review of Systems: No n/v/d/f/c/ns/sob/cp    PMH:   Past Medical History:   Diagnosis Date     Arthritis      Atrial flutter (H)      Blepharitis      Cataracts, both eyes      Hypertension     on meds     MGD (meibomian gland dysfunction)      Pacemaker        PSxH:   Past Surgical History:   Procedure Laterality Date     APPENDECTOMY       CARDIAC SURGERY      CABGx4     CATARACT IOL, RT/LT Left 07/09/2018     CATARACT IOL, RT/LT Right 07/23/2018     MOHS MICROGRAPHIC PROCEDURE       PHACOEMULSIFICATION CLEAR CORNEA WITH STANDARD INTRAOCULAR LENS IMPLANT Left 7/9/2018    Procedure: PHACOEMULSIFICATION CLEAR CORNEA WITH STANDARD INTRAOCULAR LENS IMPLANT;  LEFT EYE PHACOEMULSIFICATION CLEAR CORNEA WITH STANDARD INTRAOCULAR LENS IMPLANT ;  Surgeon: Gerardo Peña MD;  [facility]     PHACOEMULSIFICATION CLEAR CORNEA WITH STANDARD INTRAOCULAR LENS IMPLANT Right 7/23/2018    Procedure: PHACOEMULSIFICATION CLEAR CORNEA WITH STANDARD INTRAOCULAR LENS IMPLANT;  RIGHT EYE PHACOEMULSIFICATION CLEAR CORNEA WITH STANDARD INTRAOCULAR LENS IMPLANT ;  Surgeon: Gerardo Peña MD;  Location:  [facility]     Quadruple Bypass Surgery  1992       Allergies: Patient has no known allergies.    SH:   Social History     Socioeconomic  History     Marital status:      Spouse name: Not on file     Number of children: Not on file     Years of education: Not on file     Highest education level: Not on file   Occupational History     Not on file   Tobacco Use     Smoking status: Never     Smokeless tobacco: Never   Substance and Sexual Activity     Alcohol use: No     Drug use: No     Sexual activity: Not on file   Other Topics Concern     Parent/sibling w/ CABG, MI or angioplasty before 65F 55M? Not Asked   Social History Narrative     Not on file     Social Determinants of Health     Financial Resource Strain: Not on file   Food Insecurity: Not on file   Transportation Needs: Not on file   Physical Activity: Not on file   Stress: Not on file   Social Connections: Not on file   Intimate Partner Violence: Not on file   Housing Stability: Not on file       FH:   Family History   Problem Relation Age of Onset     Cancer Brother         skin cancer     Skin Cancer Brother      Skin Cancer Sister      Glaucoma No family hx of      Macular Degeneration No family hx of      Melanoma No family hx of        Objective:  Data Unavailable Data Unavailable Data Unavailable Data Unavailable Data Unavailable 0 lbs 0 oz    PT pulses are not palpable secondary to +1 pitting edema and DP pulses are 2/4 bilaterally. CRT is 1 second. Diminished pedal hair. Telangiectasia and spider veins are noted to bilateral feet, ankles, and legs. +1 pitting edema noted to ankles and legs.  Gross sensation is intact bilaterally.   Equinus is noted bilaterally. No pain with active or passive ROM of the ankle, MTJ, 1st ray, or halluces bilaterally,. Fat pad atrophy noted to the plantar foot around the fifth metatarsal tuberosity and the met heads.  Nails no mycotic rmal bilaterally. No open lesions are noted. Hyperkeratotic lesions are noted to bilateral fifth metatarsal heads with left greater than right.  No pain noted with palpation of the left.      Assessment: Gerard steiner  87-year-old with PVD as noted in the vascular exam and tylomas x2.  With his current therapies, he no longer is having pain in the calluses.  I recommended that he continue using a pumice stone to the area.  He can also continue using the urea cream.     Plan:  - Pt seen and evaluated.  -Tylomas debrided x1.  - Order written for new orthotics.   -See again PRWENDIE Pandya DPM

## 2023-09-07 ENCOUNTER — OFFICE VISIT (OUTPATIENT)
Dept: DERMATOLOGY | Facility: CLINIC | Age: 88
End: 2023-09-07
Payer: MEDICARE

## 2023-09-07 DIAGNOSIS — D49.2 NEOPLASM OF UNSPECIFIED BEHAVIOR OF BONE, SOFT TISSUE, AND SKIN: Primary | ICD-10-CM

## 2023-09-07 DIAGNOSIS — L57.0 AK (ACTINIC KERATOSIS): ICD-10-CM

## 2023-09-07 DIAGNOSIS — Z85.828 HISTORY OF NONMELANOMA SKIN CANCER: ICD-10-CM

## 2023-09-07 PROCEDURE — 88342 IMHCHEM/IMCYTCHM 1ST ANTB: CPT | Mod: TC | Performed by: DERMATOLOGY

## 2023-09-07 PROCEDURE — 11102 TANGNTL BX SKIN SINGLE LES: CPT | Performed by: DERMATOLOGY

## 2023-09-07 PROCEDURE — 99213 OFFICE O/P EST LOW 20 MIN: CPT | Mod: 25 | Performed by: DERMATOLOGY

## 2023-09-07 PROCEDURE — 17000 DESTRUCT PREMALG LESION: CPT | Mod: XS | Performed by: DERMATOLOGY

## 2023-09-07 PROCEDURE — 88305 TISSUE EXAM BY PATHOLOGIST: CPT | Mod: 26 | Performed by: DERMATOLOGY

## 2023-09-07 RX ORDER — ROSUVASTATIN CALCIUM 20 MG/1
20 TABLET, COATED ORAL
COMMUNITY
Start: 2023-06-20

## 2023-09-07 ASSESSMENT — PAIN SCALES - GENERAL: PAINLEVEL: NO PAIN (0)

## 2023-09-07 NOTE — LETTER
"9/7/2023       RE: Gerard Manuel  [address] [place] [address]     Dear Colleague,    Thank you for referring your patient, Gerard Manuel, to the [facility] [place] at [facility]. Please see a copy of my visit note below.    [facility] Dermatology Note  Encounter Date: Sep 7, 2023  Office Visit     Dermatology Problem List:  NUB: R zygoma, biopsy performed today   Hx NMSC  - BCC, \"right nose,\" s/p MMS 2015   - SCC, left hand, s/p MMS 2018   3. 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 Tinea pedis   - Current treatment: econazole 1% cream BID       ____________________________________________    Assessment & Plan:     # NUB  Location: R zygoma  Ddx: Pigmented AK vs inflamed SK, r/o melanoma  - shave biopsy performed (see procedure note below)    # AK, R superior helix,  - cryotherapy performed (see procedure note below)    # H/o NMSC  - No evidence of recurrence  - Sun avoidance and protective strategies discussed including use of daily SPF 30+ sunscreen  - ABCDEs of melanoma and s/s of NMSC discussed    # Tinea pedis   - Current treatment: econazole 1% cream BID     # Benign lesions (cherry angiomas, SKs, lentigines, clinically banal appearing melanocytic nevi)  - ABCDs of melanoma were discussed and self skin checks were advised.  - Signs and symptoms of NMSC discussed  - Sun precaution was advised including the use of sun screens of SPF 30 or higher, sun protective clothing, and avoidance of tanning beds.  - Ultimately reassurance provided and benign nature of condition discussed    Procedures Performed:   - After signed informed consent, the affected area (R zygoma) was swabbed with an alcohol pad and injected with 1% lidocaine with 1:100,000 epinephrine. Biopsy via shave technique was performed and sent to pathology for review. Hemostasis was achieved with AlCl 20%, and petrolatum and bandage " were applied. Patient tolerated the procedure without complication. Appropriate wound care instructions were provided.     - Cryotherapy procedure note, location(s): left superior helix . After verbal consent and discussion of risks and benefits including, but not limited to, dyspigmentation/scar, blister, and pain, 1 lesion(s) was(were) treated with 1-2 mm freeze border for 1-2 cycles with liquid nitrogen. Post cryotherapy instructions were provided.      Follow-up: 1 year(s) in-person, or earlier for new or changing lesions    Staff and Medical Student:      Nikunj Clements, MS4  [employer]     I was present with the medical student who participated in the service and in the documentation.  I have verified the history and personally performed the physical exam and medical decision making.  I agree with the assessment and plan of care as documented in the note.  I personally performed all procedures.    Basil Stone MD  Dermatology Attending     ____________________________________________    CC: Skin Check (Gerard is here today for a skin check )    HPI:  Mr. Gerard Manuel is a(n) 87 year old male who presents today as a return patient for FBSE hx NMSC. Last seen 9/12/2022. Since his last visit, patient has been doing well. He has no particular skin concerns today, No new, growing, or changing lesions       Labs Reviewed:  N/A    Physical Exam:  Vitals: There were no vitals taken for this visit.  SKIN: Full skin, which includes the head/face, both arms, chest, back, abdomen,both legs, genitalia and/or groin buttocks, digits and/or nails, was examined.  - Multiple brown macules on the trunk and extremities, no concerning features   - Waxy brown stuck on papules on trunk and extremities   - Red dome shaped papules on trunk and extremities   - Light brown macules accenuated on sun exposed areas  - Slightly erythematous 4mm papule, with areas of hyperpigmentation on R zygoma  - gritty  papules on right superior helix  - No other lesions of concern on areas examined.     Medications:  Current Outpatient Medications   Medication    atenolol (TENORMIN) 25 MG tablet    econazole nitrate 1 % external cream    ELIQUIS ANTICOAGULANT 5 MG tablet    rosuvastatin (CRESTOR) 20 MG tablet    aspirin 325 MG tablet    neomycin-polymyxin-dexamethasone (MAXITROL) 3.5-68367-8.1 ophthalmic ointment    neomycin-polymyxin-dexamethasone (MAXITROL) 3.5-48945-7.1 ophthalmic ointment    rivaroxaban ANTICOAGULANT (XARELTO) 20 MG TABS tablet    simvastatin (ZOCOR) 40 MG tablet     No current facility-administered medications for this visit.      Past Medical History:   Patient Active Problem List   Diagnosis    Blepharitis    BCC (basal cell carcinoma), face    Inflamed seborrheic keratosis    Tinea pedis    History of nonmelanoma skin cancer    Seborrheic keratoses, inflamed    Hypertrophic scar    Neoplasm of uncertain behavior of skin    Actinic keratosis    Screening exam for skin cancer    Multiple benign nevi    Seborrheic keratoses     Past Medical History:   Diagnosis Date    Arthritis     Atrial flutter (H)     Blepharitis     Cataracts, both eyes     Hypertension     on meds    MGD (meibomian gland dysfunction)     Pacemaker        CC Referred Self, MD  No address on file on close of this encounter.

## 2023-09-07 NOTE — PATIENT INSTRUCTIONS
"Sun Protection      Sunscreen   What does \"broad spectrum mean\"?  Broad spectrum sunscreens protect against both UVA and UVB radiation. UVC is filtered out by the ozone layer.     What does SPF mean?   SPF stands for  Sun Protection Factor  and represents the ability to screen only UVB (burning) rays. UVB rays are mostly blocked in all sunscreens, but only those that contain titanium dioxide, zinc oxide, mexoryl or Parsol 1789 (avobenzone) block the UVA spectrum. Even though a sunscreen is labeled  UVA/UVB Protection  that is not entirely accurate because products that only partially protect against UVA can claim to protect against both UVA and UVB.     What SPF should I chose?   Aim to get a sunscreen that is at least sun protection factor (SPF) 30. SPF 15 provides about 92-93% coverage, SPF 30 about 95-97% coverage, and SPF 45 about 98% coverage. That is to say, SPF 30 is not twice as good as SPF 15. The reason why we recommend SPF 30 is because we are usually only putting on half the necessary amount of sunscreen to achieve the advertised protection. That means that it is very possible that your SPF 30 sunscreen is only providing you with SPF 15 coverage based on how much you are applying. SPF 15 (92-93% coverage) is the absolute minimal that we recommend. Similarly, the benefit of sunscreens with SPF higher than 50 is that even if you put on less than the required amount, you are likely still getting good protection (ex: even if you apply only half the recommended amount of , it should still provide you with an SPF of 50).     How much should I apply?  If covering your whole body, you should be using 30 grams, or one ounce, which is how much is in one shot glass! That s a THICK layer! May times, you are only applying half the recommended amount, which means that you are only getting half the SPF (for example, you may be using SPF 30 but if you're only applying half the recommended amount, you're only " getting SPF 15 protection.      When do I need to wear sunscreen?  Every day, rain or shine! Even on a rainy day or a day when you are only indoors, you are still being exposed harmful UV radiation from the sun. We usually recommend physical/mineral sunscreens (active ingredient is titanium dioxide or zinc oxide) as these ingredients have been around for many years, there is no concern of them being absorbed into the bloodstream, and they are coral reef friendly! However, the best sunscreen is the one that you will use everyday.     What about my kids?  Sunscreen is not recommended for infants under the age of 6 months. Use clothing, shade and sun avoidance for small infants. For kids older than 6 months, we recommend that you should use only mineral/physical sunscreens that have zinc oxide as the active ingredient. Sun-protective clothing and hats are also important for people of all ages.     Sun protective clothing and Resources   Coolibar (www.coolibar.Teralytics)  I and love and you (wwwZyga)  AthleMXP4 (8th Story)  Pegastech (Aquion Energy)  Carve Designs (Symform) - affordable  Skinz (Aequus Technologiesskinz.com)    Long sleeve - Rosa Cool DRI UPF 50 or [place] PFG UPF 50  Hoodie - [place] PFG UPF 50  Swimshirt/Rash Guard - Sheela UPF 50 (on Amazon)  Neck - Outdoor Research Ubertubes (www.outdoorresearch.Teralytics)      Do I need tinted sunscreen?  There is more and more research showing that visible light can also lead to discoloration (such as melasma). Tinted sunscreens (which contain iron oxides) protect against visible light as an added bonus.     What brands do you recommend?  Physical/Mineral Sunscreens (in no particular order)  Elta MD UV Physical Broad-Spectrum SPF 41 Sunscreen (Tinted, $33)  Skin Ceuticals Physical Fusion UV Defense SPF 50 (Tinted, $34)  Unsun Mineral Tinted Face Sunscreen (Tinted, with 2 shade ranges, $29)  It Cosmetics CC+ Cream with SPF 50+ (Tinted, can also double as  foundation/coverage -- great range of shades, $40)  Biossance Squalane + Zinc Sheer Mineral Sunscreen SPF 30 PA +++ (goes on white then blends in, $30)  Cerave 100% Mineral Sunscreen SPF 50 Face (good for sensitive skin, $15)  La Roche Posay Anthelios Mineral Zinc Oxide Sunscreen SPF 50 ($35)  La Roche Posay Anthelios Mineral Tinted Sunscreen for Face SPF 50 ($35)  Think Sport Sunscreen (great for sports, though has more of a white cast, $20)  Think Baby Sunscreen (for kids, $21)  Color Science Sunforgettable Total Protection Brush On Shield SPF 50 (Multiple tints, $130)    Chemical Sunscreens  Leanne Silva Weightless Protection SPF 30 ($48)  Delia SPF Brightening Moisturizer ($30)  Urban Skin Complexion Protection Moisturizer SPF 30 ($20)  Total Defense + Repair Broad Spectrum SPF 34 ($68)  Clarins UV PLUS Anti-Pollution Sunscreen Multi-Protection Tint SPF 50 (Multiple tints, $45)  Neutrogena Healthy Skin Glow Sheers Tinted Moisturizer with SPF 20 (Multiple tints, $11)

## 2023-09-07 NOTE — NURSING NOTE
Dermatology Rooming Note    Gerard Manuel's goals for this visit include:   Chief Complaint   Patient presents with    Skin Check     Gerard is here today for a skin check        DENIA Ashby

## 2023-09-07 NOTE — PROGRESS NOTES
"[facility] Dermatology Note  Encounter Date: Sep 7, 2023  Office Visit     Dermatology Problem List:  1. NUB: R zygoma, biopsy performed today   2. Hx NMSC  - BCC, \"right nose,\" s/p MMS 2015   - SCC, left hand, s/p MMS 2018   3. 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 Tinea pedis   - Current treatment: econazole 1% cream BID       ____________________________________________    Assessment & Plan:     # NUB  Location: R zygoma  Ddx: Pigmented AK vs inflamed SK, r/o melanoma  - shave biopsy performed (see procedure note below)    # AK, R superior helix,  - cryotherapy performed (see procedure note below)    # H/o NMSC  - No evidence of recurrence  - Sun avoidance and protective strategies discussed including use of daily SPF 30+ sunscreen  - ABCDEs of melanoma and s/s of NMSC discussed    # Tinea pedis   - Current treatment: econazole 1% cream BID     # Benign lesions (cherry angiomas, SKs, lentigines, clinically banal appearing melanocytic nevi)  - ABCDs of melanoma were discussed and self skin checks were advised.  - Signs and symptoms of NMSC discussed  - Sun precaution was advised including the use of sun screens of SPF 30 or higher, sun protective clothing, and avoidance of tanning beds.  - Ultimately reassurance provided and benign nature of condition discussed    Procedures Performed:   - After signed informed consent, the affected area (R zygoma) was swabbed with an alcohol pad and injected with 1% lidocaine with 1:100,000 epinephrine. Biopsy via shave technique was performed and sent to pathology for review. Hemostasis was achieved with AlCl 20%, and petrolatum and bandage were applied. Patient tolerated the procedure without complication. Appropriate wound care instructions were provided.     - Cryotherapy procedure note, location(s): left superior helix . After verbal consent and discussion of risks and benefits including, but not limited to, dyspigmentation/scar, blister, and pain, 1 " lesion(s) was(were) treated with 1-2 mm freeze border for 1-2 cycles with liquid nitrogen. Post cryotherapy instructions were provided.      Follow-up: 1 year(s) in-person, or earlier for new or changing lesions    Staff and Medical Student:      Nikunj Clements, MS4  [employer]     I was present with the medical student who participated in the service and in the documentation.  I have verified the history and personally performed the physical exam and medical decision making.  I agree with the assessment and plan of care as documented in the note.  I personally performed all procedures.    Basil Stone MD  Dermatology Attending     ____________________________________________    CC: Skin Check (Gerard is here today for a skin check )    HPI:  Mr. Gerard Manuel is a(n) 87 year old male who presents today as a return patient for FBSE hx NMSC. Last seen 9/12/2022. Since his last visit, patient has been doing well. He has no particular skin concerns today, No new, growing, or changing lesions       Labs Reviewed:  N/A    Physical Exam:  Vitals: There were no vitals taken for this visit.  SKIN: Full skin, which includes the head/face, both arms, chest, back, abdomen,both legs, genitalia and/or groin buttocks, digits and/or nails, was examined.  - Multiple brown macules on the trunk and extremities, no concerning features   - Waxy brown stuck on papules on trunk and extremities   - Red dome shaped papules on trunk and extremities   - Light brown macules accenuated on sun exposed areas  - Slightly erythematous 4mm papule, with areas of hyperpigmentation on R zygoma  - gritty papules on right superior helix  - No other lesions of concern on areas examined.     Medications:  Current Outpatient Medications   Medication     atenolol (TENORMIN) 25 MG tablet     econazole nitrate 1 % external cream     ELIQUIS ANTICOAGULANT 5 MG tablet     rosuvastatin (CRESTOR) 20 MG tablet     aspirin 325 MG tablet      neomycin-polymyxin-dexamethasone (MAXITROL) 3.5-51786-3.1 ophthalmic ointment     neomycin-polymyxin-dexamethasone (MAXITROL) 3.5-87648-7.1 ophthalmic ointment     rivaroxaban ANTICOAGULANT (XARELTO) 20 MG TABS tablet     simvastatin (ZOCOR) 40 MG tablet     No current facility-administered medications for this visit.      Past Medical History:   Patient Active Problem List   Diagnosis     Blepharitis     BCC (basal cell carcinoma), face     Inflamed seborrheic keratosis     Tinea pedis     History of nonmelanoma skin cancer     Seborrheic keratoses, inflamed     Hypertrophic scar     Neoplasm of uncertain behavior of skin     Actinic keratosis     Screening exam for skin cancer     Multiple benign nevi     Seborrheic keratoses     Past Medical History:   Diagnosis Date     Arthritis      Atrial flutter (H)      Blepharitis      Cataracts, both eyes      Hypertension     on meds     MGD (meibomian gland dysfunction)      Pacemaker        CC Referred Self, MD  No address on file on close of this encounter.

## 2023-09-13 LAB
PATH REPORT.COMMENTS IMP SPEC: ABNORMAL
PATH REPORT.COMMENTS IMP SPEC: ABNORMAL
PATH REPORT.COMMENTS IMP SPEC: YES
PATH REPORT.FINAL DX SPEC: ABNORMAL
PATH REPORT.GROSS SPEC: ABNORMAL
PATH REPORT.MICROSCOPIC SPEC OTHER STN: ABNORMAL
PATH REPORT.RELEVANT HX SPEC: ABNORMAL

## 2023-09-15 ENCOUNTER — TELEPHONE (OUTPATIENT)
Dept: DERMATOLOGY | Facility: CLINIC | Age: 88
End: 2023-09-15
Payer: MEDICARE

## 2023-09-15 NOTE — TELEPHONE ENCOUNTER
[facility] Call Center    Phone Message    May a detailed message be left on voicemail: yes     Reason for Call: Pt wants to speak with Dr. Stone about path results. Please call [phone] Thanks!     Action Taken: Other: DERM    Travel Screening: Not Applicable

## 2023-09-20 PROBLEM — D49.2 NEOPLASM OF UNSPECIFIED BEHAVIOR OF BONE, SOFT TISSUE, AND SKIN: Status: ACTIVE | Noted: 2023-09-20

## 2023-09-20 PROBLEM — D03.9 MELANOMA IN SITU (H): Status: ACTIVE | Noted: 2023-09-20

## 2023-09-20 PROBLEM — L57.0 AK (ACTINIC KERATOSIS): Status: ACTIVE | Noted: 2023-09-20

## 2023-09-21 ENCOUNTER — TELEPHONE (OUTPATIENT)
Dept: DERMATOLOGY | Facility: CLINIC | Age: 88
End: 2023-09-21
Payer: MEDICARE

## 2023-09-21 NOTE — TELEPHONE ENCOUNTER
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 Serrano. Provided my direct phone number.    Arleen Jackson on 9/21/2023 at 2:10 PM

## 2023-09-22 NOTE — TELEPHONE ENCOUNTER
Called patient to schedule surgery with Dr. Serrano    Date of Surgery: 10/30    Surgery type: mohs    Consult scheduled: No    Has patient had mohs with us before? Yes    Additional comments: herman Jackson on 9/22/2023 at 8:52 AM

## 2023-09-23 NOTE — TELEPHONE ENCOUNTER
FUTURE VISIT INFORMATION      FUTURE VISIT INFORMATION:  Date: 10.30.23  Time: 8:30  Location: [facility]  REFERRAL INFORMATION:  Referring provider:  [facility]  Referring providers clinic:  Derm  Reason for visit/diagnosis  would like sooner. 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 return pt.      RECORDS REQUESTED FROM:       Clinic name Comments Records Status Imaging Status   Derm 9.7.23  Path # [id number] Epic Epic

## 2023-10-02 ENCOUNTER — TELEPHONE (OUTPATIENT)
Dept: DERMATOLOGY | Facility: CLINIC | Age: 88
End: 2023-10-02
Payer: MEDICARE

## 2023-10-02 NOTE — TELEPHONE ENCOUNTER
[facility] Call Center    Phone Message    May a detailed message be left on voicemail: yes     Reason for Call: Other: Pt called and wanted to know if there was any cancellations. Please call him back. Thanks      Action Taken: Message routed to:  [facility] DERM    Travel Screening: Not Applicable

## 2023-10-02 NOTE — TELEPHONE ENCOUNTER
Called pt. There are no sooner openings, but I will be in touch once something becomes available.     Arleen Jackson, Procedure  10/2/2023 4:07 PM

## 2023-10-30 ENCOUNTER — OFFICE VISIT (OUTPATIENT)
Dept: DERMATOLOGY | Facility: CLINIC | Age: 88
End: 2023-10-30
Payer: MEDICARE

## 2023-10-30 ENCOUNTER — PRE VISIT (OUTPATIENT)
Dept: DERMATOLOGY | Facility: CLINIC | Age: 88
End: 2023-10-30

## 2023-10-30 VITALS — HEART RATE: 95 BPM | DIASTOLIC BLOOD PRESSURE: 75 MMHG | SYSTOLIC BLOOD PRESSURE: 120 MMHG

## 2023-10-30 DIAGNOSIS — D03.39 MELANOMA IN SITU OF CHEEK (H): ICD-10-CM

## 2023-10-30 PROCEDURE — 21282 LATERAL CANTHOPEXY: CPT | Mod: RT | Performed by: DERMATOLOGY

## 2023-10-30 PROCEDURE — 17311 MOHS 1 STAGE H/N/HF/G: CPT | Mod: GC | Performed by: DERMATOLOGY

## 2023-10-30 PROCEDURE — 13132 CMPLX RPR F/C/C/M/N/AX/G/H/F: CPT | Mod: 59 | Performed by: DERMATOLOGY

## 2023-10-30 PROCEDURE — 88342 IMHCHEM/IMCYTCHM 1ST ANTB: CPT | Mod: TC | Performed by: DERMATOLOGY

## 2023-10-30 PROCEDURE — 88305 TISSUE EXAM BY PATHOLOGIST: CPT | Mod: 26 | Performed by: DERMATOLOGY

## 2023-10-30 PROCEDURE — 88342 IMHCHEM/IMCYTCHM 1ST ANTB: CPT | Mod: 26 | Performed by: DERMATOLOGY

## 2023-10-30 PROCEDURE — 15240 FTH/GFT F/C/C/M/N/AX/G/H/F20: CPT | Mod: GC | Performed by: DERMATOLOGY

## 2023-10-30 PROCEDURE — 17312 MOHS ADDL STAGE: CPT | Mod: GC | Performed by: DERMATOLOGY

## 2023-10-30 ASSESSMENT — PAIN SCALES - GENERAL: PAINLEVEL: NO PAIN (0)

## 2023-10-30 NOTE — PROGRESS NOTES
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    Informed consent was obtained and the patient underwent the procedure as follows:  The patient was placed supine on the operating table.  The cancer was identified, outlined with a marker, and verified by the patient.  The entire surgical field was prepped with chlorhexidine.  The surgical site was anesthetized using lidocaine with epinephrine.    The area of clinically apparent tumor was excised and sent for permanent sections to rule out invasive melanoma. The peripheral rim of tissue was then surgically excised using a #15 blade and was then transferred onto a specimen sheet maintaining the orientation of the specimen. Hemostasis was obtained using bipolar electrocoagulation. The wound site was then covered with a dressing while the tissue samples were processed for examination.    The excised tissue was transported to the Mohs histology laboratory maintaining the tissue orientation.  The tissue specimen was relaxed so that the entire surgical margin was in a a single horizontal plane for sectioning and inked for  precise mapping.  A precise reference map was drawn to reflect the sectioning of the specimen, colored inking of the margins, and orientation on the patient. The tissue was processed using horizontal sectioning of the base and continuous peripheral margins. Vertical, bread loafed sections were obtained from the central portion of the lesion, prior to being sent for permament sections. A control biopsy at the right preauricular cheek was taken to compare the density and periodicity of melanocytes along the dermal-epidermal junction.     The tissue sections were stained with Hematoxylin and Eosin (H&E), as well as Melanoma antigen recognized by T cells or Melan-A (MART-1) stain. The histopathologic sections were reviewed in conjunction with the reference map.     Total blocks: 2   Total slides:  13    Was the skin lesion clear at this stage?: No, the skin lesion was determined not clear at this stage. There was increased density and periodicity of atypical-appearing melanocytes with areas of confluence at the epidermis. Additional stage(s) performed (see below).    Stage II   The patient was returned to the operating room, and the area prepped in the usual manner. The residual tumor was excised using the reference map as a guide. The specimen was transfered to a labeled specimen sheet maintaining the orientation of the specimen. Hemostasis was obtained and the wound site was covered with a dressing while the tissue was processed for examination.     The excised tissue was transported to the Mohs histology laboratory maintaining orientation. The specimen margins were inked for precise mapping and a reference map was prepared for the is additional stage to maintain precise orientation as described above. The tissue was processed using horizontal sectioning of the base and continuous peripheral margins. The histopathologic sections were reviewed in conjunction with the reference map.     Total blocks: 1    Total slides:   6    Residual tumor was identified and indicated in red on the reference map, identifying the location where further tissue excision was necessary. Therefore, an additional stage of Mohs Micrographic surgery was deemed necessary.     Stage III   The patient was returned to the operating room, and the area prepped in the usual manner. The residual tumor was excised using the reference map as a guide. The specimen was transfered to a labeled specimen sheet maintaining the orientation of the specimen. Hemostasis was obtained and the wound site was covered with a dressing while the tissue was processed for examination.     The excised tissue was transported to the Mohs histology laboratory maintaining orientation. The specimen margins were inked for precise mapping and a reference map was prepared for the is additional stage to maintain precise orientation as described above. The tissue was processed using horizontal sectioning of the base and continuous peripheral margins. The histopathologic sections were reviewed in conjunction with the reference map.     Total blocks: 1    Total slides:  4    There were no cancer cells visualized on examination, therefore Mohs surgery was complete.      PROCEDURE: Complex closure with FTSG (Burrow's graft) and lateral canthus canthopexy    REPAIR:     Due to one or more of the following factors, complex linear closure was required/indicated: free margin involvement and/or placement of retention sutures. An complex layered linear closure was selected as the procedure which would maximally preserve both function and cosmesis.    After the excision of the tumor, the area was carefully undermined. Hemostasis was obtained with electrocoagulation.  Closure was oriented so that the wound was parallel to the patient's relaxed skin tension lines. Several tacking sutures were placed to bring the cheek superiorly using 4-0 Monocryl. The subcutaneous and dermal layers were then closed with 4-0  Monocryl sutures. The epidermis was then carefully approximated along the length of the wound using 5-0 fast absorbing gut simple running sutures.     A lateral canthopexy was performed at the right lateral canthus to decrease risk of post-operative ectropion. Using a 15-blade, an incision was made laterally from the lateral canthal angle to the depth of the periorbital fat pad. The lateral canthus tendon was identified. A 5-0 Vicryl suture was placed to tack the lateral canthal tendon to the interior aspect of the lateral orbital rim periosteum.     The medial most portion of the defect was closed using a Burrow's FTSG from the standing cone on the right lateral cheek. The standing cone was placed in sterile saline on tray until time for graft placement. The graft was thinned using curved iris scissors to remove subcutaneous fat. The graft was sutured into place using 5-0 fast gut simple interrupted and simple running sutures.     Estimated blood loss was less than 10 ml for all surgical sites. A sterile pressure dressing was applied and wound care instructions, with a written handout, were given. The patient was discharged from the [facility] Center alert and ambulatory.    The attending physician was present for the key portions of the procedure and always immediately available.     Scribe Disclosure:   I, CESILIA WRIGHT, am serving as a scribe; to document services personally performed by Hiren Serrano MD -based on data collection and the provider's statements to me.     Dr. Khushbu Correa (Mohs micrographic surgery fellow) performed the Mohs micrographic surgery and reconstruction under the direct supervision of Hiren Serrano MD, who was present for the entire micrographic surgery and key portions of the reconstruction, and always immediately available.    Suture removal: N/A (all dissolving sutures used)    Follow-up with dermatologic surgery: 2 week wound check, 3 month scar eval    Khushbu Correa,  MD  Micrographic Surgery and Dermatologic Oncology (MSDO) Fellow, PGY-5      Attending attestation:  I was present for key elements of the procedure and immediately available for all other portions of the procedure.  I have reviewed the note and edited it as necessary.    Hiren Serrano M.D.  Professor  Director of Dermatologic Surgery  Department of Dermatology  [facility]    Dermatology Surgery Clinic  [facility] and Surgery [place] [id number]

## 2023-10-30 NOTE — LETTER
10/30/2023       RE: Gerard Manuel  [address] [place] [address]     Dear Colleague,    Thank you for referring your patient, Gerard Manuel, to the [facility] DERMATOLOGIC SURGERY [facility]IC [place] at [facility]. Please see a copy of my visit note below.    [facility] Dermatologic Surgery Clinic [place] Procedure Note    Date of Service:  Oct 30, 2023  Surgery: Mohs micrographic surgery    Case 1  Repair Type: complex closure, FTSG (Burrow's graft), lateral canthopexy of right eye  Repair Size: complex closure 6.5 cm; graft size 1.0 x 0.5 cm  Suture Material: 5-0 Vicryl / 4-0 Monocryl / 5-0 Fast Absorbing Gut  Tumor Type: Melanoma in situ  Location: R Zygoma  Derm-Path Accession #: [id number]     PreOp Size: 1.2 cm x 1.0 cm  PostOp Size: 4.2 cm x 2.9 cm  Mohs Accession #:   Level of Defect: N/A    Procedure:    Stage I  We discussed the principles of treatment and most likely complications including scarring, bleeding, infection, swelling, pain, crusting, nerve damage, large wound,  incomplete excision, wound dehiscence,  nerve damage, recurrence, and a second procedure may be recommended to obtain the best cosmetic or functional result.    Informed consent was obtained and the patient underwent the procedure as follows:  The patient was placed supine on the operating table.  The cancer was identified, outlined with a marker, and verified by the patient.  The entire surgical field was prepped with chlorhexidine.  The surgical site was anesthetized using lidocaine with epinephrine.    The area of clinically apparent tumor was excised and sent for permanent sections to rule out invasive melanoma. The peripheral rim of tissue was then surgically excised using a #15 blade and was then transferred onto a specimen sheet maintaining the orientation of the specimen. Hemostasis was obtained using bipolar electrocoagulation. The  wound site was then covered with a dressing while the tissue samples were processed for examination.    The excised tissue was transported to the Mohs histology laboratory maintaining the tissue orientation.  The tissue specimen was relaxed so that the entire surgical margin was in a a single horizontal plane for sectioning and inked for precise mapping.  A precise reference map was drawn to reflect the sectioning of the specimen, colored inking of the margins, and orientation on the patient. The tissue was processed using horizontal sectioning of the base and continuous peripheral margins. Vertical, bread loafed sections were obtained from the central portion of the lesion, prior to being sent for permament sections. A control biopsy at the right preauricular cheek was taken to compare the density and periodicity of melanocytes along the dermal-epidermal junction.     The tissue sections were stained with Hematoxylin and Eosin (H&E), as well as Melanoma antigen recognized by T cells or Melan-A (MART-1) stain. The histopathologic sections were reviewed in conjunction with the reference map.     Total blocks: 2   Total slides:  13    Was the skin lesion clear at this stage?: No, the skin lesion was determined not clear at this stage. There was increased density and periodicity of atypical-appearing melanocytes with areas of confluence at the epidermis. Additional stage(s) performed (see below).    Stage II   The patient was returned to the operating room, and the area prepped in the usual manner. The residual tumor was excised using the reference map as a guide. The specimen was transfered to a labeled specimen sheet maintaining the orientation of the specimen. Hemostasis was obtained and the wound site was covered with a dressing while the tissue was processed for examination.     The excised tissue was transported to the [facility] histology laboratory maintaining orientation. The specimen margins were inked for precise  mapping and a reference map was prepared for the is additional stage to maintain precise orientation as described above. The tissue was processed using horizontal sectioning of the base and continuous peripheral margins. The histopathologic sections were reviewed in conjunction with the reference map.     Total blocks: 1    Total slides:  6    Residual tumor was identified and indicated in red on the reference map, identifying the location where further tissue excision was necessary. Therefore, an additional stage of Mohs Micrographic surgery was deemed necessary.     Stage III   The patient was returned to the operating room, and the area prepped in the usual manner. The residual tumor was excised using the reference map as a guide. The specimen was transfered to a labeled specimen sheet maintaining the orientation of the specimen. Hemostasis was obtained and the wound site was covered with a dressing while the tissue was processed for examination.     The excised tissue was transported to the Mohs histology laboratory maintaining orientation. The specimen margins were inked for precise mapping and a reference map was prepared for the is additional stage to maintain precise orientation as described above. The tissue was processed using horizontal sectioning of the base and continuous peripheral margins. The histopathologic sections were reviewed in conjunction with the reference map.     Total blocks: 1    Total slides:  4    There were no cancer cells visualized on examination, therefore Mohs surgery was complete.      PROCEDURE: Complex closure with FTSG (Burrow's graft) and lateral canthus canthopexy    REPAIR:     Due to one or more of the following factors, complex linear closure was required/indicated: free margin involvement and/or placement of retention sutures. An complex layered linear closure was selected as the procedure which would maximally preserve both function and cosmesis.    After the excision of  the tumor, the area was carefully undermined. Hemostasis was obtained with electrocoagulation.  Closure was oriented so that the wound was parallel to the patient's relaxed skin tension lines. Several tacking sutures were placed to bring the cheek superiorly using 4-0 Monocryl. The subcutaneous and dermal layers were then closed with 4-0 Monocryl sutures. The epidermis was then carefully approximated along the length of the wound using 5-0 fast absorbing gut simple running sutures.     A lateral canthopexy was performed at the right lateral canthus to decrease risk of post-operative ectropion. Using a 15-blade, an incision was made laterally from the lateral canthal angle to the depth of the periorbital fat pad. The lateral canthus tendon was identified. A 5-0 Vicryl suture was placed to tack the lateral canthal tendon to the interior aspect of the lateral orbital rim periosteum.     The medial most portion of the defect was closed using a Burrow's FTSG from the standing cone on the right lateral cheek. The standing cone was placed in sterile saline on tray until time for graft placement. The graft was thinned using curved iris scissors to remove subcutaneous fat. The graft was sutured into place using 5-0 fast gut simple interrupted and simple running sutures.     Estimated blood loss was less than 10 ml for all surgical sites. A sterile pressure dressing was applied and wound care instructions, with a written handout, were given. The patient was discharged from the Dermatologic Surgery Center alert and ambulatory.    The attending physician was present for the key portions of the procedure and always immediately available.     Scribe Disclosure:   I, CESILIA WRIGHT, am serving as a scribe; to document services personally performed by Hiren Serrano MD -based on data collection and the provider's statements to me.     Dr. Khushbu Correa (Mohs micrographic surgery fellow) performed the Mohs micrographic surgery and  reconstruction under the direct supervision of Hiren Serrano MD, who was present for the entire micrographic surgery and key portions of the reconstruction, and always immediately available.    Suture removal: N/A (all dissolving sutures used)    Follow-up with dermatologic surgery: 2 week wound check, 3 month scar [place]    Khushbu Correa MD  Micrographic Surgery and Dermatologic Oncology (MSDO) Fellow, PGY-5      Attending attestation:  I was present for key elements of the procedure and immediately available for all other portions of the procedure.  I have reviewed the note and edited it as necessary.    Hiren Serrano M.D.  Professor  Director of Dermatologic Surgery  Department of Dermatology  [facility]    Dermatology Surgery Clinic  [facility] [place] [phone]

## 2023-10-30 NOTE — NURSING NOTE
Chief Complaint   Patient presents with    Procedure     Patient presents for Mohs procedure for treatment of Melanoma on the right zygoma.      Lauren Good LPN

## 2023-10-30 NOTE — PATIENT INSTRUCTIONS
Wound Care Instructions  I will experience scar, altered skin color, bleeding, swelling, pain, crusting and redness. I may experience altered sensation. Risks are excessive bleeding, infection, muscle weakness, thick (hypertrophic or keloidal) scar, and recurrence,. A second procedure may be recommended to obtain the best cosmetic or functional result.  Possible complications of any surgical procedure are bleeding, infection, scarring, alteration in skin color and sensation, muscle weakness in the area, wound dehiscence or seperation, or recurrence of the lesion or disease. On occasion, after healing, a secondary procedure or revision may be recommended in order to obtain the best cosmetic or functional result.   After your surgery, a pressure bandage will be placed over the area that has sutures. This will help prevent bleeding.   For the First 48 hours After Surgery:  Leave the pressure bandage on and keep it dry. If it should come loose, you may retape it, but do not take it off.  Relax and take it easy. Do not do any vigorous exercise, heavy lifting, or bending forward. This could cause the wound to bleed.  Post-operative pain is usually mild. You may take plain or extra strength Tylenol every 4 hours as needed (do not take more than 4,000mg in one day). Do not take any medicine that contains aspirin, ibuprofen or motrin unless you have been recommended these by a doctor.  Avoid alcohol and vitamin E as these may increase your tendency to bleed.  You may put an ice pack around the bandaged area for 20 minutes every 2-3 hours. This may help reduce swelling, bruising, and pain. Make sure the ice pack is waterproof so that the pressure bandage does not get wet.   You may see a small amount of drainage or blood on your pressure bandage. This is normal. However, if drainage or bleeding continues or saturates the bandage, you will need to apply firm pressure over the bandage with a washcloth for 15 minutes. If  bleeding continues after applying pressure for 15 minutes then go to the nearest emergency room.  48 Hours After Surgery  Carefully remove the bandage and start daily wound care and dressing changes. You may also now shower and get the wound wet. Wash wound with a mild soap and water.  Use caution when washing the wound. Be gentle and do not let the forceful shower stream hit the wound directly.  PAT dry.  Daily Wound Care:  Wash wound with a mild soap and water.  Use caution when washing the wound, be gentle and do not let the forceful shower stream hit the wound directly.  PAT DRY.  Apply Vaseline (from a new container or tube) over the suture line with a Q-tip. It is very important to keep the wound continuously moist, as wounds heal best in a moist environment.   Keep the site covered until sutures are removed, you can cover it with a Telfa (non-stick) dressing and tape or a band-aid.    If you are unable to keep wound covered, you must apply Vaseline every 2 - 3 hours (while awake) to ensure it is being kept moist for optimal healing. A dressing overnight is recommended to keep the area moist.   Call Us If:  You have pain that is not controlled with Tylenol.  You have signs or symptoms of an infection, such as: fever over 100 degrees F, redness, warmth, or foul-smelling or yellow/creamy drainage from the wound.  Who should I call with questions?  [facility]: [phone]   [facility]: [phone]  For urgent needs outside of business hours call the [facility] at [phone] and ask for the dermatology resident on call

## 2023-11-01 ENCOUNTER — TELEPHONE (OUTPATIENT)
Dept: DERMATOLOGY | Facility: CLINIC | Age: 88
End: 2023-11-01
Payer: MEDICARE

## 2023-11-01 NOTE — TELEPHONE ENCOUNTER
Follow up call completed following Mohs procedure with Dr. Serrano.     The following questions were asked:    What are you doing to manage your pain? Tylenol, but not having much pain  Is it helping? N/A  Are you applying ice?  No  Have you had any noticeable bleeding through the bandage? No  Do you have any concerns?       Please call [phone] option 3 if you have any additional questions.    Kati ESTRADA RN

## 2023-11-02 ENCOUNTER — NURSE TRIAGE (OUTPATIENT)
Dept: NURSING | Facility: CLINIC | Age: 88
End: 2023-11-02
Payer: MEDICARE

## 2023-11-02 ENCOUNTER — MYC MEDICAL ADVICE (OUTPATIENT)
Dept: DERMATOLOGY | Facility: CLINIC | Age: 88
End: 2023-11-02
Payer: MEDICARE

## 2023-11-02 LAB
PATH REPORT.COMMENTS IMP SPEC: NORMAL
PATH REPORT.FINAL DX SPEC: NORMAL
PATH REPORT.GROSS SPEC: NORMAL
PATH REPORT.MICROSCOPIC SPEC OTHER STN: NORMAL
PATH REPORT.RELEVANT HX SPEC: NORMAL

## 2023-11-02 NOTE — TELEPHONE ENCOUNTER
"    Pt calling: right eye swelling, redness, eye drainage.    Pt mostly concerned about redness: upper/lower lid, yesterday had small amount eye drainage.  See note  High priority note to Derm for call back to pt w/ plan- recommendation.  Pt # [phone]    Date of Service:  Oct 30, 2023  Surgery: Mohs micrographic surgery  treatment of Melanoma on the right zygoma.     Hiren Serrano MD  Dermatology      Reason for Disposition   Caller has URGENT question and triager unable to answer question    Additional Information   Negative: Major abdominal surgical incision and wound gaping open with visible internal organs   Negative: Sounds like a life-threatening emergency to the triager   Negative: SEVERE pain in the incision   Negative: Incision gaping open and < 2 days (48 hours) since wound re-opened   Negative: Incision gaping open and length of opening > 2 inches (5 cm)   Negative: Patient sounds very sick or weak to the triager   Negative: Sounds like a serious complication to the triager   Negative: Fever > 100.4 F (38.0 C)   Negative: Incision looks infected (spreading redness, pain)   Negative: Red streak runs from the incision and longer than 1 inch (2.5 cm)   Negative: Pus or bad-smelling fluid draining from incision   Negative: Dressing soaked with blood or body fluid (e.g., drainage)   Negative: Raised bruise and size > 2 inches (5 cm) and expanding    Answer Assessment - Initial Assessment Questions  1. SYMPTOM: \"What's the main symptom you're concerned about?\" (e.g., drainage, incision opening up, pain, redness)    Right eye swelling, redness, tender  Swelling on eye :partially closed, can see out of eye, no change in vision.  Small amount of discharge from eye yesterday - white color, yellow. Just dried mucous drainage today- minimal.    Has put ice on - yesterday and day before.  Will use again eye lid area.    2. ONSET: \"When did   start?\"      Since surgery- swelling,redness. States swelling worse in the " "AM. Redness continues, denies any bruising.    3. SURGERY: \"What surgery did you have?\"      Date of Service:  Oct 30, 2023  Surgery: Mohs micrographic surgery    4. DATE of SURGERY: \"When was the surgery?\"       10-30-23  5. INCISION SITE: \"Where is the incision located?\"         Mohs incision: Right check area: Denies any drainage, swelling, redness ( has not changed dressing today)  6. REDNESS: \"Is there any redness at the incision site?\" If Yes, ask: \"How wide across is the redness?\" (Inches, centimeters)       Right eye lid - upper and lower    7. PAIN: \"Is there any pain?\" If Yes, ask: \"How bad is it?\"  (Scale 1-10; or mild, moderate, severe)    - NONE (0): no pain    - MILD (1-3): doesn't interfere with normal activities     - MODERATE (4-7): interferes with normal activities or awakens from sleep     - SEVERE (8-10): excruciating pain, unable to do any normal activities      Pain is on eye lid, #3  No pain inside the eye    8. BLEEDING: \"Is there any bleeding?\" If Yes, ask: \"How much?\" and \"Where?\"  none        9. DRAINAGE: \"Is there any drainage from the incision site?\" If Yes, ask: \"What color and how much?\" (e.g., red, cloudy, pus; drops, teaspoon)      See above    10. FEVER: \"Do you have a fever?\" If Yes, ask: \"What is your temperature, how was it measured, and when did it start?\"        no  11. OTHER SYMPTOMS: \"Do you have any other symptoms?\" (e.g., dizziness, rash elsewhere on body, shaking chills, weakness)        Denies above    Protocols used: Post-Op Incision Symptoms and [id number]    "

## 2023-11-02 NOTE — TELEPHONE ENCOUNTER
"Writer called patient back, he states he isn't in any pain and that he had Mohs on Monday, October 30th, 2023. Patient states he has a \"yellow-like\" discharge coming from the corner of his eye but denies any s/s of infection. I encourage patient to send in a photo, but let him know I'm not too worried about an infection as it's very early and he has no other symptoms. I let patient know this discharge is most likely just some slough and/or eye boogers/mucous coming from the site. Patient acknowledged and will work on sending a photo.    Kati ESTRADA RN    "

## 2023-11-13 ENCOUNTER — OFFICE VISIT (OUTPATIENT)
Dept: DERMATOLOGY | Facility: CLINIC | Age: 88
End: 2023-11-13
Payer: MEDICARE

## 2023-11-13 DIAGNOSIS — Z86.006 HISTORY OF MELANOMA IN SITU: Primary | ICD-10-CM

## 2023-11-13 DIAGNOSIS — Z51.89 VISIT FOR WOUND CHECK: ICD-10-CM

## 2023-11-13 PROCEDURE — 99024 POSTOP FOLLOW-UP VISIT: CPT | Performed by: DERMATOLOGY

## 2023-11-13 ASSESSMENT — PAIN SCALES - GENERAL: PAINLEVEL: NO PAIN (0)

## 2023-11-13 NOTE — NURSING NOTE
Chief Complaint   Patient presents with    Derm Problem     Patient is here today for a 2 week wound check.     Kati ESTRADA RN

## 2023-11-13 NOTE — PROGRESS NOTES
Dermatologic Surgery Post-Op Wound Check     CC: Derm Problem (Patient is here today for a 2 week wound check.)      Dermatology Problem List:  # MIS, R Zygoma  - s/p MMS 10/30/23, complex w/ FTSG, lateral canthopexy of R Eye    Subjective: Gerard Manuel is a 87 year old male who presents today for wound check after MMS to R Zygoma for MIS.   - overall doing well  - had some pain for the first 48 hrs postop, now better  - occasional blurred vision on the R, but no eye pain, dryness or grittiness  - has been applying vaseline and telfa daily  - minimal redness and swelling of the R periocular region  - no other concerns today    Objective: An exam of the face was performed today   - The surgical site noted above is clean, dry, and intact. There is some superficial white/yellow crusting, and beneath the crust there is healthy, viable graft. There is no surrounding erythema, purulence, or significant tenderness to palpation. No clinical evidence of infection noted today.              Assessment and Plan:     1. # MIS, R Zygoma  - s/p MMS 10/30/23, complex w/ FTSG, lateral canthopexy of R Eye  - The patient's surgery site(s) is/are healing very well. No evidence of infection on examination today.  - The patient was told to continue with wound cares until the area(s) is/are no longer crusted.   - The patient should follow up with dermatologic surgery PRN, as well as continue with regular skin exams in general dermatology clinic.    Patient was discussed with and evaluated by attending physician Dr. Serrano.    Scribe Disclosure:   I, CESILIA WRIGHT, am serving as a scribe; to document services personally performed by Hiren Serrano MD -based on data collection and the provider's statements to me.     Gerardo Marroquin MD PGY-3  Dermatology Resident

## 2023-11-13 NOTE — LETTER
11/13/2023       RE: Gerard Manuel  [address] [place] [address]     Dear Colleague,    Thank you for referring your patient, Gerard Manuel, to the [facility] DERMATOLOGIC SURGERY CLINIC [place] at [facility]. Please see a copy of my visit note below.    Dermatologic Surgery Post-Op Wound Check     CC: Derm Problem (Patient is here today for a 2 week wound check.)      Dermatology Problem List:  # MIS, R Zygoma  - s/p MMS 10/30/23, complex w/ FTSG, lateral canthopexy of R Eye    Subjective: Gerard Manuel is a 87 year old male who presents today for wound check after MMS to [facility] for MIS.   - overall doing well  - had some pain for the first 48 hrs postop, now better  - occasional blurred vision on the R, but no eye pain, dryness or grittiness  - has been applying vaseline and telfa daily  - minimal redness and swelling of the R periocular region  - no other concerns today    Objective: An exam of the face was performed today   - The surgical site noted above is clean, dry, and intact. There is some superficial white/yellow crusting, and beneath the crust there is healthy, viable graft. There is no surrounding erythema, purulence, or significant tenderness to palpation. No clinical evidence of infection noted today.              Assessment and Plan:     1. # MIS, R Zygoma  - s/p MMS 10/30/23, complex w/ FTSG, lateral canthopexy of R Eye  - The patient's surgery site(s) is/are healing very well. No evidence of infection on examination today.  - The patient was told to continue with wound cares until the area(s) is/are no longer crusted.   - The patient should follow up with dermatologic surgery PRN, as well as continue with regular skin exams in general dermatology clinic.    Patient was discussed with and evaluated by attending physician Dr. Serrano.    Scribe Disclosure:   I, CESILIA WRIGHT, am serving as a scribe; to document  services personally performed by Hiren Serrano MD -based on data collection and the provider's statements to me.     Gerardo Marroquin MD PGY-3  Dermatology Resident                Attestation signed by Hiren Serrano MD at 11/21/2023  8:54 AM:  Attending Attestation  I attest that the Scribe recorded the interview and exam that I personally performed.  I have reviewed the note and edited it as necessary.  Scab debrided to show healthy 100% viable graft beneath.  Hiren Serrano M.D.  Professor  Director of Dermatologic Surgery  Department of Dermatology  [facility]

## 2023-11-20 ENCOUNTER — OFFICE VISIT (OUTPATIENT)
Dept: DERMATOLOGY | Facility: CLINIC | Age: 88
End: 2023-11-20
Payer: MEDICARE

## 2023-11-20 ENCOUNTER — TELEPHONE (OUTPATIENT)
Dept: DERMATOLOGY | Facility: CLINIC | Age: 88
End: 2023-11-20

## 2023-11-20 DIAGNOSIS — Z48.89 POSTOPERATIVE VISIT: Primary | ICD-10-CM

## 2023-11-20 PROCEDURE — 99024 POSTOP FOLLOW-UP VISIT: CPT | Mod: GC | Performed by: DERMATOLOGY

## 2023-11-20 ASSESSMENT — PAIN SCALES - GENERAL: PAINLEVEL: NO PAIN (0)

## 2023-11-20 NOTE — TELEPHONE ENCOUNTER
Called patient back to discuss. He states the area has gotten more swollen, red, and he has purulent discharge. He is concerned with this being so close to his eye. Offered for patient come in today for assessment. Patient agreed

## 2023-11-20 NOTE — TELEPHONE ENCOUNTER
[facility] Call Center    Phone Message    May a detailed message be left on voicemail: yes     Reason for Call: Symptoms or Concerns     If patient has red-flag symptoms, warm transfer to triage line    Current symptom or concern: Pt is having redness around procedure area and there sometimes is discharge or a yellow/brown crust over the area     Symptoms have been present for:  4-5 day(s)    Has patient previously been seen for this? Yes    By : Dr. Serrano    Date: 11/13/23    Are there any new or worsening symptoms? Yes: Redness and discharge. Pt did not want to speak with red triage team just requested call back from team     Action Taken: Other: Derm    Travel Screening: Not Applicable

## 2023-11-20 NOTE — LETTER
11/20/2023       RE: Gerard Manuel  [address] [place] [address]     Dear Colleague,    Thank you for referring your patient, Gerard Manuel, to the [facility] DERMATOLOGIC SURGERY CLINIC [place] at [facility]. Please see a copy of my visit note below.    Dermatologic Surgery Post-Op Wound Check     CC: Derm Problem (Patient is here today for a wound check near the right lateral eye.)      Dermatology Problem List:  # MIS, R Zygoma  - s/p MMS 10/30/23, complex w/ FTSG, lateral canthopexy of R Eye    Subjective: Gerard Manuel is a 87 year old male who presents today for wound check after MMS to R Zygoma for MIS.   - 3 weeks post MMS, still draining at the corner of the eye.    -He is concerned about pale yellow buildup around the eye.   -He is also concerned with persistent goopiness in the eye, especially when he wakes up  - still gets swelling in the morning. Wants to make sure this is ok     Objective: An exam of the R Zygoma was performed today   - Well healed scar on R temple and R lateral canthus with focal area of 6 mm crust on R lateral canthus. There is no surrounding erythema, purulence, or significant tenderness to palpation. No clinical evidence of infection noted today.    Assessment and Plan:     1.  # MIS, R Zygoma  - Reassured wound appears to be healing well and no evidence of infection on exam today   - Continue to use Vaseline 2 x per day until scab is healed.   - Recommend artificial tears twice daily   - The patient should follow up with dermatologic surgery PRN, as well as continue with regular skin exams in general dermatology clinic.    Patient was discussed with and evaluated by attending physician Dr. Serrano.    Scribe Disclosure:   I, CESILIA WRIGHT, am serving as a scribe; to document services personally performed by Hiren Serrano MD -based on data collection and the provider's statements to me.     Khushbu  MD Madeline  Micrographic Surgery and Dermatologic Oncology (MSDO) Fellow, PGY-5                Attestation signed by Hiren Serrano MD at 11/21/2023 11:45 AM:  Attending Attestation  I attest that the Fellow recorded the interview and exam that I personally performed.  I have reviewed the note and edited it as necessary.    Hiren Serrano M.D.  Professor  Director of Dermatologic Surgery  Department of Dermatology  [facility]

## 2023-11-20 NOTE — NURSING NOTE
Chief Complaint   Patient presents with    Derm Problem     Patient is here today for a wound check near the right lateral eye.     Kati ESTRADA RN

## 2023-11-20 NOTE — PROGRESS NOTES
Dermatologic Surgery Post-Op Wound Check     CC: Derm Problem (Patient is here today for a wound check near the right lateral eye.)      Dermatology Problem List:  # MIS, R Zygoma  - s/p MMS 10/30/23, complex w/ FTSG, lateral canthopexy of R Eye    Subjective: Gerard Manuel is a 87 year old male who presents today for wound check after MMS to R Zygoma for MIS.   - 3 weeks post MMS, still draining at the corner of the eye.    -He is concerned about pale yellow buildup around the eye.   -He is also concerned with persistent goopiness in the eye, especially when he wakes up  - still gets swelling in the morning. Wants to make sure this is ok     Objective: An exam of the R Zygoma was performed today   - Well healed scar on R temple and R lateral canthus with focal area of 6 mm crust on R lateral canthus. There is no surrounding erythema, purulence, or significant tenderness to palpation. No clinical evidence of infection noted today.    Assessment and Plan:     1.  # MIS, R Zygoma  - Reassured wound appears to be healing well and no evidence of infection on exam today   - Continue to use Vaseline 2 x per day until scab is healed.   - Recommend artificial tears twice daily   - The patient should follow up with dermatologic surgery PRN, as well as continue with regular skin exams in general dermatology clinic.    Patient was discussed with and evaluated by attending physician Dr. Serrano.    Scribe Disclosure:   I, CESILIA WRIGHT, am serving as a scribe; to document services personally performed by Hiren Serrano MD -based on data collection and the provider's statements to me.     Khushbu Correa MD  Micrographic Surgery and Dermatologic Oncology (MSDO) Fellow, PGY-5

## 2023-12-11 ENCOUNTER — OFFICE VISIT (OUTPATIENT)
Dept: DERMATOLOGY | Facility: CLINIC | Age: 88
End: 2023-12-11
Payer: MEDICARE

## 2023-12-11 DIAGNOSIS — Z98.890 POSTOPERATIVE SCAR: Primary | ICD-10-CM

## 2023-12-11 DIAGNOSIS — L90.5 POSTOPERATIVE SCAR: Primary | ICD-10-CM

## 2023-12-11 DIAGNOSIS — B07.8 COMMON WART: ICD-10-CM

## 2023-12-11 PROCEDURE — 99213 OFFICE O/P EST LOW 20 MIN: CPT | Mod: 24 | Performed by: DERMATOLOGY

## 2023-12-11 PROCEDURE — 17110 DESTRUCTION B9 LES UP TO 14: CPT | Mod: 79 | Performed by: DERMATOLOGY

## 2023-12-11 ASSESSMENT — PAIN SCALES - GENERAL: PAINLEVEL: NO PAIN (0)

## 2023-12-11 NOTE — NURSING NOTE
Dermatology Rooming Note    Gerard Manuel's goals for this visit include:   Chief Complaint   Patient presents with    Derm Problem     Spot of concern on the right temporal area of the scalp and would like this eye rechecked (mohs on melanoma).     Radha Ash, CMA

## 2023-12-11 NOTE — PROGRESS NOTES
[facility] Dermatology Note  Encounter Date: Dec 11, 2023  Office Visit     Dermatology Problem List:  1. MIS, R Zygoma  - s/p [facility] 10/30/23, complex w/ FTSG, lateral canthopexy of R Eye  2. VV    ____________________________________________    Assessment & Plan:    # Verrucae vulgaris.   - Cryotherapy performed today (see procedure note(s) below).    # MIS, R Zygoma; with apparent mild hypertrophic scar, not compromising function  - s/p MMS 10/30/23, complex w/ FTSG, lateral canthopexy of R Eye  - The patient's surgery site(s) is/are healing very well. No evidence of infection on examination today.  - The patient was told to continue with wound cares until the area(s) is/are no longer crusted.   - The patient should follow up with dermatologic surgery PRN, as well as continue with regular skin exams in general dermatology clinic.        Procedures Performed:   - Cryotherapy procedure note, location(s): 5mm verrucous papule with mild erythema at the base on the right front parietal scalp. After verbal consent and discussion of risks and benefits including, but not limited to, dyspigmentation/scar, blister, and pain, 1 lesion(s) was(were) treated with 1-2 mm freeze border for 1-2 cycles with liquid nitrogen. Post cryotherapy instructions were provided.    Follow-up: prn for new or changing lesions    Staff and Scribe:     Scribe Disclosure:   I, MEIR CHILEL, am serving as a scribe; to document services personally performed by Zaki Epps MD -based on data collection and the provider's statements to me.     Provider Disclosure:  I agree with above History, Review of Systems, Physical exam and Plan.  I have reviewed the content of the documentation and have edited it as needed. I have personally performed the services documented here and the documentation accurately represents those services and the decisions I have made.      Electronically signed by:  Staff attestation:  The documentation  recorded by the scribe accurately reflects the services I personally performed and the decisions I personally made. I have made edits where needed.    Zaki Epps MD  Staff Dermatologist and Dermatopathologist  , Department of Dermatology   ____________________________________________    CC: Derm Problem (Spot of concern on the right temporal area of the scalp and would like this eye rechecked (mohs on melanoma).)    HPI:  Mr. Gerard Manuel is a(n) 88 year old male who presents today as a return patient for a lesion to his scalp. He states he recently got a haircut, when his wife noticed a spot on his scalp.      Patient is otherwise feeling well, without additional skin concerns.    Labs Reviewed:  N/A    Physical Exam:  Vitals: There were no vitals taken for this visit.  SKIN: Focused examination of the scalp was performed.  - well-formed scar, with slight hypertrophy, at site of MIS excision  - There is a verrucous papule with thrombosed capillaries interrupting dermatoglyphics on the scalp.   - No other lesions of concern on areas examined.     Medications:  Current Outpatient Medications   Medication     atenolol (TENORMIN) 25 MG tablet     econazole nitrate 1 % external cream     ELIQUIS ANTICOAGULANT 5 MG tablet     rosuvastatin (CRESTOR) 20 MG tablet     No current facility-administered medications for this visit.      Past Medical History:   Patient Active Problem List   Diagnosis     Blepharitis     BCC (basal cell carcinoma), face     Inflamed seborrheic keratosis     Tinea pedis     History of nonmelanoma skin cancer     Seborrheic keratoses, inflamed     Hypertrophic scar     Neoplasm of uncertain behavior of skin     Actinic keratosis     Screening exam for skin cancer     Multiple benign nevi     Seborrheic keratoses     AK (actinic keratosis)     Neoplasm of unspecified behavior of bone, soft tissue, and skin     Melanoma in situ (H)     Past Medical History:   Diagnosis  Date     Arthritis      Atrial flutter (H)      Blepharitis      Cataracts, both eyes      Hypertension     on meds     MGD (meibomian gland dysfunction)      Pacemaker         CC No referring provider defined for this encounter. on close of this encounter.

## 2023-12-11 NOTE — LETTER
12/11/2023       RE: Gerard Manuel  [address]   [place] [address]     Dear Colleague,    Thank you for referring your patient, Gerard Manuel, to the [facility] DERMATOLOGY CLINIC [place] at [facility]. Please see a copy of my visit note below.    [facility] Dermatology Note  Encounter Date: Dec 11, 2023  Office Visit     Dermatology Problem List:  1. MIS, R Zygoma  - s/p [facility] 10/30/23, complex w/ FTSG, lateral canthopexy of R Eye  2. VV    ____________________________________________    Assessment & Plan:    # Verrucae vulgaris.   - Cryotherapy performed today (see procedure note(s) below).    # MIS, R Zygoma; with apparent mild hypertrophic scar, not compromising function  - s/p [facility] 10/30/23, complex w/ FTSG, lateral canthopexy of R Eye  - The patient's surgery site(s) is/are healing very well. No evidence of infection on examination today.  - The patient was told to continue with wound cares until the area(s) is/are no longer crusted.   - The patient should follow up with dermatologic surgery PRN, as well as continue with regular skin exams in general dermatology clinic.        Procedures Performed:   - Cryotherapy procedure note, location(s): 5mm verrucous papule with mild erythema at the base on the right front parietal scalp. After verbal consent and discussion of risks and benefits including, but not limited to, dyspigmentation/scar, blister, and pain, 1 lesion(s) was(were) treated with 1-2 mm freeze border for 1-2 cycles with liquid nitrogen. Post cryotherapy instructions were provided.    Follow-up: prn for new or changing lesions    Staff and Scribe:     Scribe Disclosure:   I, MEIR CHILEL, am serving as a scribe; to document services personally performed by Zaki Epps MD -based on data collection and the provider's statements to me.     Provider Disclosure:  I agree with above History, Review of  Systems, Physical exam and Plan.  I have reviewed the content of the documentation and have edited it as needed. I have personally performed the services documented here and the documentation accurately represents those services and the decisions I have made.      Electronically signed by:  Staff attestation:  The documentation recorded by the scribe accurately reflects the services I personally performed and the decisions I personally made. I have made edits where needed.    Zaki Epps MD  Staff Dermatologist and Dermatopathologist  , Department of Dermatology   ____________________________________________    CC: Derm Problem (Spot of concern on the right temporal area of the scalp and would like this eye rechecked (mohs on melanoma).)    HPI:  Mr. Gerard Manuel is a(n) 88 year old male who presents today as a return patient for a lesion to his scalp. He states he recently got a haircut, when his wife noticed a spot on his scalp.      Patient is otherwise feeling well, without additional skin concerns.    Labs Reviewed:  N/A    Physical Exam:  Vitals: There were no vitals taken for this visit.  SKIN: Focused examination of the scalp was performed.  - well-formed scar, with slight hypertrophy, at site of MIS excision  - There is a verrucous papule with thrombosed capillaries interrupting dermatoglyphics on the scalp.   - No other lesions of concern on areas examined.     Medications:  Current Outpatient Medications   Medication    atenolol (TENORMIN) 25 MG tablet    econazole nitrate 1 % external cream    ELIQUIS ANTICOAGULANT 5 MG tablet    rosuvastatin (CRESTOR) 20 MG tablet     No current facility-administered medications for this visit.      Past Medical History:   Patient Active Problem List   Diagnosis    Blepharitis    BCC (basal cell carcinoma), face    Inflamed seborrheic keratosis    Tinea pedis    History of nonmelanoma skin cancer    Seborrheic keratoses, inflamed     Hypertrophic scar    Neoplasm of uncertain behavior of skin    Actinic keratosis    Screening exam for skin cancer    Multiple benign nevi    Seborrheic keratoses    AK (actinic keratosis)    Neoplasm of unspecified behavior of bone, soft tissue, and skin    Melanoma in situ (H)     Past Medical History:   Diagnosis Date    Arthritis     Atrial flutter (H)     Blepharitis     Cataracts, both eyes     Hypertension     on meds    MGD (meibomian gland dysfunction)     Pacemaker         CC No referring provider defined for this encounter. on close of this encounter.

## 2024-01-27 ENCOUNTER — HEALTH MAINTENANCE LETTER (OUTPATIENT)
Age: 89
End: 2024-01-27

## 2024-01-29 ENCOUNTER — OFFICE VISIT (OUTPATIENT)
Dept: DERMATOLOGY | Facility: CLINIC | Age: 89
End: 2024-01-29
Payer: MEDICARE

## 2024-01-29 DIAGNOSIS — L90.5 SCAR: Primary | ICD-10-CM

## 2024-01-29 PROCEDURE — 99024 POSTOP FOLLOW-UP VISIT: CPT | Performed by: DERMATOLOGY

## 2024-01-29 RX ORDER — TERBINAFINE HYDROCHLORIDE 250 MG/1
250 TABLET ORAL DAILY
COMMUNITY

## 2024-01-29 NOTE — NURSING NOTE
Chief Complaint   Patient presents with    Scar Management     Scar eval, thinks it has healed well     Cristina TATUM, RN-BSN  Dermatology Surgery  [phone]

## 2024-01-29 NOTE — PROGRESS NOTES
Dermatologic Surgery Post-Op Scar Check     CC: Scar Management (Scar eval, thinks it has healed well)      Dermatology Problem List:  1. 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 Verrucae Vulgaris, right front parietal scalp, s/p cryotherapy 12/11/23    Subjective: Gerard Manuel is a 88 year old male who presents today for scar evaluation after MMS with FTSG of his right zygoma for MIS.     - He was last seen by Dr. Epps on 12/11/23 for postoperative scar and verruca vulgaris of the scalp which was treated with cryotherapy.     -Today he notes that he can feel tightness in the scar and can feel sensation changes within the scar line.      - no other concerns today    Objective: An exam of the right eye and temple was performed today   - well-healed skin graph scar on the right temple.   - two benign appearing nevi           Assessment and Plan:     1. 1. 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  - ILK was offered today to treat tightness in the scar. The patient opted out this treatment at this time, but may consider in the future.  - The patient should follow up with dermatologic surgery PRN, as well as continue with regular skin exams in general dermatology clinic.    Patient was discussed with and evaluated by attending physician Dr. Hiren Serrano.    Staff Involved:   Scribe/Staff    Scribe Disclosure:   I, CESILIA WRIGHT, am serving as a scribe; to document services personally performed by Hiren Serrano MD -based on data collection and the provider's statements to me.     Attending Attestation  I attest that the Scribe recorded the interview and exam that I personally performed.  I have reviewed the note and edited it as necessary.    Hiren Serrano M.D.  Professor  Director of Dermatologic Surgery  Department of Dermatology  [facility]

## 2024-01-29 NOTE — LETTER
1/29/2024       RE: Gerard Manuel  [address] [place] [address]     Dear Colleague,    Thank you for referring your patient, Gerard Manuel, to the [facility] DERMATOLOGIC SURGERY CLINIC [place] at [facility]. Please see a copy of my visit note below.    Dermatologic Surgery Post-Op Scar Check     CC: Scar Management (Scar eval, thinks it has healed well)      Dermatology Problem List:  1. 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 Verrucae Vulgaris, right front parietal scalp, s/p cryotherapy 12/11/23    Subjective: Gerard Manuel is a 88 year old male who presents today for scar evaluation after MMS with FTSG of his right zygoma for MIS.     - He was last seen by Dr. Epps on 12/11/23 for postoperative scar and verruca vulgaris of the scalp which was treated with cryotherapy.     -Today he notes that he can feel tightness in the scar and can feel sensation changes within the scar line.      - no other concerns today    Objective: An exam of the right eye and temple was performed today   - well-healed skin graph scar on the right temple.   - two benign appearing nevi       Assessment and Plan:     1. 1. 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  - ILK was offered today to treat tightness in the scar. The patient opted out this treatment at this time, but may consider in the future.  - The patient should follow up with dermatologic surgery PRN, as well as continue with regular skin exams in general dermatology clinic.    Patient was discussed with and evaluated by attending physician Dr. Hiren Serrano.    Staff Involved:   Scribe/Staff    Scribe Disclosure:   I, CESILIA RWIGHT, am serving as a scribe; to document services personally performed by Hiren Serrano MD -based on data collection and the provider's statements to me.     Attending  Attestation  I attest that the Scribe recorded the interview and exam that I personally performed.  I have reviewed the note and edited it as necessary.    Hiren Serrano M.D.  Professor  Director of Dermatologic Surgery  Department of Dermatology  [facility]

## 2024-04-17 ENCOUNTER — TELEPHONE (OUTPATIENT)
Dept: DERMATOLOGY | Facility: CLINIC | Age: 89
End: 2024-04-17
Payer: MEDICARE

## 2024-04-17 NOTE — TELEPHONE ENCOUNTER
Health Call Center    Phone Message    May a detailed message be left on voicemail: yes     Reason for Call: Symptoms or Concerns       Current symptom or concern: Previous removal. Itchy and irritating.    Symptoms have been present for:  1 month(s)    Has patient previously been seen for this? Yes    By : Hiren Serrano MD     Date: 01/29/2024    Are there any new or worsening symptoms? Yes: Patient has noticed increased irritation, tightness, itching and redness. This is occurring on the location near the right eye where patient had surgery in January. Patient does have an appointment in August with . Patient feels he needs to be seen sooner. Please call patient back to discuss.    Action Taken: Message routed to:  [facility] ([facility] Derm    Travel Screening: Not Applicable

## 2024-05-07 ENCOUNTER — OFFICE VISIT (OUTPATIENT)
Dept: DERMATOLOGY | Facility: CLINIC | Age: 89
End: 2024-05-07
Payer: MEDICARE

## 2024-05-07 DIAGNOSIS — L90.5 SCAR: Primary | ICD-10-CM

## 2024-05-07 DIAGNOSIS — Z86.006 HISTORY OF MELANOMA IN SITU: ICD-10-CM

## 2024-05-07 PROCEDURE — 99024 POSTOP FOLLOW-UP VISIT: CPT | Mod: GC | Performed by: DERMATOLOGY

## 2024-05-07 RX ORDER — TRIAMCINOLONE ACETONIDE 40 MG/ML
6 INJECTION, SUSPENSION INTRA-ARTICULAR; INTRAMUSCULAR ONCE
Status: COMPLETED | OUTPATIENT
Start: 2024-05-07 | End: 2024-05-07

## 2024-05-07 RX ADMIN — TRIAMCINOLONE ACETONIDE 6 MG: 40 INJECTION, SUSPENSION INTRA-ARTICULAR; INTRAMUSCULAR at 14:45

## 2024-05-07 ASSESSMENT — PAIN SCALES - GENERAL: PAINLEVEL: NO PAIN (0)

## 2024-05-07 NOTE — NURSING NOTE
Chief Complaint   Patient presents with    Scar Management     Right side of face     Peggy LAY CMA

## 2024-05-07 NOTE — PROGRESS NOTES
Dermatologic Surgery Post-Op Scar Check     CC: Scar Management (Right side of face)      Dermatology Problem List:  1. 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 He was last seen on 1/29/24 by me with no complications upon exam. ILK was offered at that time however he chose to reserve for possible future treatment.   - Stated that in March, he was experiencing edema and pruritus of the surgical site. In April, he experienced more redness and swelling. He then was seen and treated for an infection of the site and has completed a round of doxycycline.   - denies gritty or gilberto sensation in that eye  - denies pain of the eye itself   - has noticed a tight band of scar on the side of the right eye     Objective: An exam of the right zygoma was performed today   - vertical web of hypertrophic scar at the right lateral canthus             Procedures Performed:   Kenalog intralesional injection procedure note: After verbal consent and discussion of risks including but not limited to atrophy, pain, and bruising, time out was performed, the patient underwent positioning and the area was prepped with isopropyl alcohol, 0.2 total cc of Kenalog 20 mg/cc was injected into 2 sites on the R Lateral Canthus.  The patient tolerated the procedure well and left the Dermatology clinic in good condition.        Assessment and Plan:     1. 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 Discussed ILK  treatment and patient ok with ILK injection today.   - ILK treatment today (proc note above)  - discussed we could repeat ILK or do laser or surgical revision if ILK unsuccessful     RTC 4 weeks for re-check.     Patient was discussed with and evaluated by attending physician Dr. Hiren Serrano.    Khushbu Correa MD  Micrographic Surgery and Dermatologic Oncology (MSDO) Fellow, PGY-5    Staff Involved:   Scribe/Fellow/Staff    Scribe Disclosure:   I, CESILIA WRIGHT, am serving as a scribe; to document services personally performed by Hiren Serrano MD -based on data collection and the provider's statements to me.     Attending Attestation  I attest that I discussed the case with the Fellow.  I agree with the plan.   I have reviewed the note and edited it as necessary.    Hiren Serrano M.D.  Professor  Director of Dermatologic Surgery  Department of Dermatology  [facility]

## 2024-05-07 NOTE — LETTER
5/7/2024       RE: Gerard Manuel  [address] [place] [address]     Dear Colleague,    Thank you for referring your patient, Gerard Manuel, to the [facility] DERMATOLOGIC SURGERY CLINIC [place] at [facility]. Please see a copy of my visit note below.    Dermatologic Surgery Post-Op Scar Check     CC: Scar Management (Right side of face)      Dermatology Problem List:  1. 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 He was last seen on 1/29/24 by me with no complications upon exam. ILK was offered at that time however he chose to reserve for possible future treatment.   - Stated that in March, he was experiencing edema and pruritus of the surgical site. In April, he experienced more redness and swelling. He then was seen and treated for an infection of the site and has completed a round of doxycycline.   - denies gritty or gilberto sensation in that eye  - denies pain of the eye itself   - has noticed a tight band of scar on the side of the right eye     Objective: An exam of the right zygoma was performed today   - vertical web of hypertrophic scar at the right lateral canthus             Procedures Performed:   Kenalog intralesional injection procedure note: After verbal consent and discussion of risks including but not limited to atrophy, pain, and bruising, time out was performed, the patient underwent positioning and the area was prepped with isopropyl alcohol, 0.2 total cc of Kenalog 20 mg/cc was injected into 2 sites on the R Lateral Canthus.  The patient  tolerated the procedure well and left the Dermatology clinic in good condition.        Assessment and Plan:     1. 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 Discussed ILK treatment and patient ok with ILK injection today.   - ILK treatment today (proc note above)  - discussed we could repeat ILK or do laser or surgical revision if ILK unsuccessful     RTC 4 weeks for re-check.     Patient was discussed with and evaluated by attending physician Dr. Hiren Serrano.    Khushbu Correa MD  Micrographic Surgery and Dermatologic Oncology (MSDO) Fellow, PGY-5    Staff Involved:   Scribe/Fellow/Staff    Scribe Disclosure:   I, CESILIA WRIGHT, am serving as a scribe; to document services personally performed by Hiren Serrano MD -based on data collection and the provider's statements to me.     Attending Attestation  I attest that I discussed the case with the Fellow.  I agree with the plan.   I have reviewed the note and edited it as necessary.    Hiren Serrano M.D.  Professor  Director of Dermatologic Surgery  Department of Dermatology  [facility]                  Drug Administration Record    Prior to injection, verified patient identity using patient's name and date of birth.  Due to injection administration, patient instructed to remain in clinic for 15 minutes  afterwards, and to report any adverse reaction to me immediately.    Drug Name: triamcinolone (KENALOG)  Dose: 6mg  Route administered: SQ  NDC #: [address]-8  Amount of waste(mL):8.5 mL  Reason for waste: Single use vial    LOT #: [id number]  SITE: Right side of face  : [employer]  EXPIRATION DATE:     Kati ESTRADA RN

## 2024-05-07 NOTE — PROGRESS NOTES
Drug Administration Record    Prior to injection, verified patient identity using patient's name and date of birth.  Due to injection administration, patient instructed to remain in clinic for 15 minutes  afterwards, and to report any adverse reaction to me immediately.    Drug Name: triamcinolone (KENALOG)  Dose: 6mg  Route administered: SQ  NDC #: [address]-0  Amount of waste(mL):8.5 mL  Reason for waste: Single use vial    LOT #: [id number]  SITE: Right side of face  : [employer]  EXPIRATION DATE:     Kati ESTRADA RN

## 2024-06-04 ENCOUNTER — OFFICE VISIT (OUTPATIENT)
Dept: DERMATOLOGY | Facility: CLINIC | Age: 89
End: 2024-06-04
Payer: MEDICARE

## 2024-06-04 DIAGNOSIS — L90.5 SCAR: Primary | ICD-10-CM

## 2024-06-04 DIAGNOSIS — Z86.006 HISTORY OF MELANOMA IN SITU: ICD-10-CM

## 2024-06-04 PROCEDURE — 11900 INJECT SKIN LESIONS </W 7: CPT | Mod: GC | Performed by: DERMATOLOGY

## 2024-06-04 RX ORDER — TRIAMCINOLONE ACETONIDE 40 MG/ML
8 INJECTION, SUSPENSION INTRA-ARTICULAR; INTRAMUSCULAR ONCE
Status: COMPLETED | OUTPATIENT
Start: 2024-06-04 | End: 2024-06-04

## 2024-06-04 RX ADMIN — TRIAMCINOLONE ACETONIDE 8 MG: 40 INJECTION, SUSPENSION INTRA-ARTICULAR; INTRAMUSCULAR at 15:29

## 2024-06-04 ASSESSMENT — PAIN SCALES - GENERAL: PAINLEVEL: NO PAIN (0)

## 2024-06-04 NOTE — NURSING NOTE
Chief Complaint   Patient presents with    Derm Problem     Patient presents for scar evaluation of the right zygoma. The patient reports some improvement with the kenalog injection.      Lauren Good LPN

## 2024-06-04 NOTE — PROGRESS NOTES
Dermatologic Surgery Post-Op Scar Check     CC: Derm Problem (Patient presents for scar evaluation of the right zygoma. The patient reports some improvement with the kenalog injection. )      Dermatology Problem List:  1. 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 Previously seen by me on 5/7/24 and presented with a web at right lateral canthus that was treated with ILK-20.  - Today, he notes some improvement s/p ILK. Denies issues with vision in that eye. Notes some firmness, swelling and numbness along the scar line.   - denies gritty or gilberto sensation of the eye. Says he recently got an eye exam and it was normal.   - no other concerns today    Objective: An exam of the right lateral canthus was performed today   - webbing and hypertrophic scar at the right lateral canthus, some improvement since last. Slight lateral ectropion present.         Procedures Performed:   Kenalog intralesional injection procedure note: After verbal consent and discussion of risks including but not limited to atrophy, pain, and bruising, time out was performed, the patient underwent positioning and the area was prepped with isopropyl alcohol, 0.2 total cc of Kenalog 40 mg/cc was injected into 2 sites on the right lateral canthus.  The patient tolerated the procedure well and left the Dermatology clinic in good condition.      Assessment and Plan:   1.  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 Improved with prior ILK-20. Will try higher concentration today and see if we can get further  improvement.   - ILK-40 given at right lateral canthus today.   - Consider repeat ILK vs surgical intervention vs CO2 if not resolved at next visit     RTC 4-6 weeks for re-check, sooner prn.     Patient was discussed with and evaluated by attending physician Dr. Hiren Serrano.    Khushbu Correa MD  Micrographic Surgery and Dermatologic Oncology (MSDO) Fellow, PGY-5    Staff Involved:   Scribe/Fellow/Staff    Scribe Disclosure:   I, CESILIA WRIGHT, am serving as a scribe; to document services personally performed by Hiren Serrano MD -based on data collection and the provider's statements to me.       Attending attestation:  I was present for key elements of the procedure and immediately available for all other portions of the procedure.  I have reviewed the note and edited it as necessary.    Hiren Serrano M.D.  Professor  Director of Dermatologic Surgery  Department of Dermatology  [facility]    Dermatology Surgery Clinic  [facility] [address] [address]            '

## 2024-06-04 NOTE — NURSING NOTE
Drug Administration Record    Prior to injection, verified patient identity using patient's name and date of birth.  Due to injection administration, patient instructed to remain in clinic for 15 minutes  afterwards, and to report any adverse reaction to me immediately.    Drug Name: triamcinolone acetonide(kenalog)  Dose: 0.2mL of triamcinolone 40mg/mL, 8mg dose  Route administered: ID  NDC #: [id number]  Amount of waste(mL):0.8ml  Reason for waste: Single use vial    LOT #: [id number]  SITE: see note  : [employer]  EXPIRATION DATE: 01/2026

## 2024-07-02 NOTE — PROGRESS NOTES
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  Previously seen by me on 6/4/24 and presented with a web at right lateral canthus that was treated with ILK-40 and at that visit we discussed repeat ILK vs surgical intervention vs CO2 laser. The patient has been doing well since his last ILK and reports that sensation seems to have returned to this area. He has noticed nice flattening of the scar and better overall movement with less tension. At this time, no indication for further procedures. We reviewed that post surgical scar will continue to soften up and become more mobile as time goes on. The patient understood and agreed to the plan. All questions were answered.   - No further intervention planned, surgical site well healed   - Follow up with Dr. Stone as scheduled for FBSE   - The patient should follow up with dermatologic surgery PRN, as well as continue with regular skin exams in general dermatology clinic with Dr. Stone.     Objective: An exam of the right lateral canthus was performed today   - small thread sized arenas to the right lateral canthus remains in area of prior hypertrophic scar   - previous area of hypertrophic scar is now flat, well healed scar   - facial movement appropriate       Assessment and Plan:     1.   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  - The patient should follow up with dermatologic surgery PRN, as well as continue with  regular skin exams in general dermatology clinic with Dr. Stone.     Patient was discussed with and evaluated by attending physician Dr. Serrano.    Chelsie Hamilton, DO 4:10 PM     I have seen, evaluated and discussed the patient with resident physician. I have reviewed the resident physicians note and agree with their clinical findings, assessment and plan.  Appropriate changes to the resident's note have been made.

## 2024-07-08 ENCOUNTER — OFFICE VISIT (OUTPATIENT)
Dept: DERMATOLOGY | Facility: CLINIC | Age: 89
End: 2024-07-08
Payer: MEDICARE

## 2024-07-08 DIAGNOSIS — D03.39 MELANOMA IN SITU OF CHEEK (H): Primary | ICD-10-CM

## 2024-07-08 PROCEDURE — 99212 OFFICE O/P EST SF 10 MIN: CPT | Mod: GC | Performed by: DERMATOLOGY

## 2024-07-08 ASSESSMENT — PAIN SCALES - GENERAL: PAINLEVEL: NO PAIN (0)

## 2024-07-08 NOTE — LETTER
7/8/2024       RE: Gerard Manuel  [address] [place] [address]     Dear Colleague,    Thank you for referring your patient, Gerard Manuel, to the [facility] DERMATOLOGIC SURGERY CLINIC [place] at [facility]. Please see a copy of my visit note below.    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  Previously seen by me on 6/4/24 and presented with a web at right lateral canthus that was treated with ILK-40 and at that visit we discussed repeat ILK vs surgical intervention vs CO2 laser. The patient has been doing well since his last ILK and reports that sensation seems to have returned to this area. He has noticed nice flattening of the scar and better overall movement with less tension. At this time, no indication for further procedures. We reviewed that post surgical scar will continue to soften up and become more mobile as time goes on. The patient understood and agreed to the plan. All questions were answered.   - No further intervention planned, surgical site well healed   - Follow up with Dr. Stone as scheduled for FBSE   - The patient should follow up with dermatologic surgery PRN, as well as continue with regular skin exams in general dermatology clinic with Dr. Stone.     Objective: An exam of the right lateral canthus was performed today   - small thread sized arenas to the right lateral canthus remains in area of prior hypertrophic scar   -  previous area of hypertrophic scar is now flat, well healed scar   - facial movement appropriate       Assessment and Plan:     1.   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  - The patient should follow up with dermatologic surgery PRN, as well as continue with regular skin exams in general dermatology clinic with Dr. Stone.     Patient was discussed with and evaluated by attending physician Dr. Serrano.    Chelsie Hamilton,  4:10 PM     I have seen, evaluated and discussed the patient with resident physician. I have reviewed the resident physicians note and agree with their clinical findings, assessment and plan.  Appropriate changes to the resident's note have been made.                     Attestation signed by Hiren Serrano MD at 7/12/2024  8:49 AM:  Attending Attestation  I attest that the resident recorded the interview and exam that I personally performed.  I have reviewed the note and edited it as necessary.    Hiren Serrano M.D.  Professor  Director of Dermatologic Surgery  Department of Dermatology  [facility]      Again, thank you for allowing me to participate in the care of your patient.      Sincerely,    Hiren Serrano MD

## 2024-07-08 NOTE — NURSING NOTE
Dermatology Rooming Note    Gerard Manuel's goals for this visit include:   Chief Complaint   Patient presents with    Derm Problem     MIS, R Zygoma - Gerard is here today for a recheck of the scar      Paul GALARZA CMA

## 2024-08-22 ENCOUNTER — OFFICE VISIT (OUTPATIENT)
Dept: DERMATOLOGY | Facility: CLINIC | Age: 89
End: 2024-08-22
Payer: MEDICARE

## 2024-08-22 DIAGNOSIS — Z12.83 SKIN CANCER SCREENING: Primary | ICD-10-CM

## 2024-08-22 DIAGNOSIS — Z85.828 HISTORY OF NONMELANOMA SKIN CANCER: ICD-10-CM

## 2024-08-22 PROCEDURE — 99213 OFFICE O/P EST LOW 20 MIN: CPT | Performed by: DERMATOLOGY

## 2024-08-22 ASSESSMENT — PAIN SCALES - GENERAL: PAINLEVEL: NO PAIN (0)

## 2024-08-22 NOTE — NURSING NOTE
Dermatology Rooming Note    Gerard Manuel's goals for this visit include:   Chief Complaint   Patient presents with    Skin Check     Gerard is here today for a skin check      DEINA Santoro - Dermatology

## 2024-08-22 NOTE — LETTER
"8/22/2024       RE: Gerard Manuel  [address] [place] [address]     Dear Colleague,    Thank you for referring your patient, Gerard Manuel, to the [facility] DERMATOLOGY [facility]IC [place] at [facility]. Please see a copy of my visit note below.    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 Tinea pedis   - Previous treatment: econazole 1% cream BID   ______________________________________    Impression/Plan:    #History of NMSC (BCC 2015 to right side of nose), (SCC to left hand 2018)  #History of Melanoma in situ (Right Zygoma 2024)  No evidence of recurrence on exam and scarring from MMS is acceptable to the patient.   -Continue FBSE q 6 months     #Benign skin lesions, scattered hyperkeratotic papules that appear stuck on (Sks), hyperpigmented patches (solar lentinges), vascular appearing papules (cherry angiomas), and pigmented and skin colored papules (nevi).  Of note, the pigmented lesion in the left sunny appears benign on dermoscopy and grossly (ie, lack of ABCDE features, not strikingly an ugly duckling sign). Will observe for now.    -Encouraged sun protective behaviors and sunscreen use.      Follow-up in 6 months.     Staff Involved:  Mikey Black II, MPH  Medical Student Progress Note     Seen and staffed with Dr. Chase MD    I was present with the medical student who participated in the service and in the documentation.  I have verified the history and personally performed the physical exam and medical decision making.  I agree with the assessment and plan of care as documented in the note.      Basil Stone MD  Dermatology Attending       CC:   Chief Complaint   Patient presents with "     Skin Check     Gerard is here today for a skin check        History of Present Illness:  Mr. Gerard Manuel is a 88 year old male who presents for FBSE in setting of melanoma in situ treated with Mohs on 10/30/2024. Pt reports that he had kenalog injections to the right zygoma scar in area of melanoma in situ with improvement (that is, no longer palpable and cosmetically). He has no lesions of concern.      Physical exam:  Vitals: There were no vitals taken for this visit.  GEN: well developed, well-nourished, in no acute distress, in a pleasant mood.     SKIN:   -Scattered hyperkeratotic papules that appear stuck on (Sks), hyperpigmented patches (solar lentinges), vascular appearing papules (cherry angiomas), and pigmented and skin colored papules (nevi).   -Hyperpigmented macule to the left sunny as shown:     Past Medical History:   Past Medical History:   Diagnosis Date     Arthritis      Atrial flutter (H)      Blepharitis      Cataracts, both eyes      Hypertension     on meds     MGD (meibomian gland dysfunction)      Pacemaker      Past Surgical History:   Procedure Laterality Date     APPENDECTOMY       CARDIAC SURGERY      CABGx4     CATARACT IOL, RT/LT Left 07/09/2018     CATARACT IOL, RT/LT Right 07/23/2018     MOHS MICROGRAPHIC PROCEDURE       PHACOEMULSIFICATION CLEAR CORNEA WITH STANDARD INTRAOCULAR LENS IMPLANT Left 7/9/2018    Procedure: PHACOEMULSIFICATION CLEAR CORNEA WITH STANDARD INTRAOCULAR LENS IMPLANT;  LEFT EYE PHACOEMULSIFICATION CLEAR CORNEA WITH STANDARD INTRAOCULAR LENS IMPLANT ;  Surgeon: Gerardo Peña MD;  [facility]     PHACOEMULSIFICATION CLEAR CORNEA WITH STANDARD INTRAOCULAR LENS IMPLANT Right 7/23/2018    Procedure: PHACOEMULSIFICATION CLEAR CORNEA WITH STANDARD INTRAOCULAR LENS IMPLANT;  RIGHT EYE PHACOEMULSIFICATION CLEAR CORNEA WITH STANDARD INTRAOCULAR LENS IMPLANT ;  Surgeon: Gerardo Peña MD;  Location:  [facility]     Quadruple Bypass Surgery  1992        Social History:   reports that he has never smoked. He has never used smokeless tobacco. He reports that he does not drink alcohol and does not use drugs.    Family History:  Family History   Problem Relation Age of Onset     Cancer Brother         skin cancer     Skin Cancer Brother      Skin Cancer Sister      Glaucoma No family hx of      Macular Degeneration No family hx of      Melanoma No family hx of        Medications:  Current Outpatient Medications   Medication Sig Dispense Refill     atenolol (TENORMIN) 25 MG tablet Take 50 mg by mouth daily       ELIQUIS ANTICOAGULANT 5 MG tablet        rosuvastatin (CRESTOR) 20 MG tablet 20 mg       econazole nitrate 1 % external cream APPLY A THIN LAYER TO BOTTOM OF FEET TWICE DAILY AFTER WASHING THE FEET. DO NOT FORGET TO APPLY BETWEEN THE TOES (Patient not taking: Reported on 7/8/2024) 85 g 10     terbinafine (LAMISIL) 250 MG tablet Take 250 mg by mouth daily (Patient not taking: Reported on 7/8/2024)       No Known Allergies                Again, thank you for allowing me to participate in the care of your patient.      Sincerely,    Basil Stone MD

## 2024-08-22 NOTE — PROGRESS NOTES
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 Tinea pedis   - Previous treatment: econazole 1% cream BID   ______________________________________    Impression/Plan:    #History of NMSC (BCC 2015 to right side of nose), (SCC to left hand 2018)  #History of Melanoma in situ (Right Zygoma 2024)  No evidence of recurrence on exam and scarring from MMS is acceptable to the patient.   -Continue FBSE q 6 months     #Benign skin lesions, scattered hyperkeratotic papules that appear stuck on (Sks), hyperpigmented patches (solar lentinges), vascular appearing papules (cherry angiomas), and pigmented and skin colored papules (nevi).  Of note, the pigmented lesion in the left sunny appears benign on dermoscopy and grossly (ie, lack of ABCDE features, not strikingly an ugly duckling sign). Will observe for now.    -Encouraged sun protective behaviors and sunscreen use.      Follow-up in 6 months.     Staff Involved:  Mikey Black II, MPH  Medical Student Progress Note     Seen and staffed with Dr. Chase MD    I was present with the medical student who participated in the service and in the documentation.  I have verified the history and personally performed the physical exam and medical decision making.  I agree with the assessment and plan of care as documented in the note.      Basil Stone MD  Dermatology Attending       CC:   Chief Complaint   Patient presents with    Skin Check     Gerard is here today for a skin check        History of Present Illness:  Mr. Gerard Manuel is a 88 year old male who presents for FBSE in setting of melanoma in situ treated with Mohs on 10/30/2024. Pt reports that he had kenalog injections to the right zygoma scar in area of melanoma in situ with " improvement (that is, no longer palpable and cosmetically). He has no lesions of concern.      Physical exam:  Vitals: There were no vitals taken for this visit.  GEN: well developed, well-nourished, in no acute distress, in a pleasant mood.     SKIN:   -Scattered hyperkeratotic papules that appear stuck on (Sks), hyperpigmented patches (solar lentinges), vascular appearing papules (cherry angiomas), and pigmented and skin colored papules (nevi).   -Hyperpigmented macule to the left sunny as shown:     Past Medical History:   Past Medical History:   Diagnosis Date    Arthritis     Atrial flutter (H)     Blepharitis     Cataracts, both eyes     Hypertension     on meds    MGD (meibomian gland dysfunction)     Pacemaker      Past Surgical History:   Procedure Laterality Date    APPENDECTOMY      CARDIAC SURGERY      CABGx4    CATARACT IOL, RT/LT Left 07/09/2018    CATARACT IOL, RT/LT Right 07/23/2018    MOHS MICROGRAPHIC PROCEDURE      PHACOEMULSIFICATION CLEAR CORNEA WITH STANDARD INTRAOCULAR LENS IMPLANT Left 7/9/2018    Procedure: PHACOEMULSIFICATION CLEAR CORNEA WITH STANDARD INTRAOCULAR LENS IMPLANT;  LEFT EYE PHACOEMULSIFICATION CLEAR CORNEA WITH STANDARD INTRAOCULAR LENS IMPLANT ;  Surgeon: Gerardo Peña MD;  Location: [facility]    PHACOEMULSIFICATION CLEAR CORNEA WITH STANDARD INTRAOCULAR LENS IMPLANT Right 7/23/2018    Procedure: PHACOEMULSIFICATION CLEAR CORNEA WITH STANDARD INTRAOCULAR LENS IMPLANT;  RIGHT EYE PHACOEMULSIFICATION CLEAR CORNEA WITH STANDARD INTRAOCULAR LENS IMPLANT ;  Surgeon: Gerardo Peña MD;  Location: [facility]    Quadruple Bypass Surgery  1992       Social History:   reports that he has never smoked. He has never used smokeless tobacco. He reports that he does not drink alcohol and does not use drugs.    Family History:  Family History   Problem Relation Age of Onset    Cancer Brother         skin cancer    Skin Cancer Brother     Skin Cancer Sister     Glaucoma No family hx of      Macular Degeneration No family hx of     Melanoma No family hx of        Medications:  Current Outpatient Medications   Medication Sig Dispense Refill    atenolol (TENORMIN) 25 MG tablet Take 50 mg by mouth daily      ELIQUIS ANTICOAGULANT 5 MG tablet       rosuvastatin (CRESTOR) 20 MG tablet 20 mg      econazole nitrate 1 % external cream APPLY A THIN LAYER TO BOTTOM OF FEET TWICE DAILY AFTER WASHING THE FEET. DO NOT FORGET TO APPLY BETWEEN THE TOES (Patient not taking: Reported on 7/8/2024) 85 g 10    terbinafine (LAMISIL) 250 MG tablet Take 250 mg by mouth daily (Patient not taking: Reported on 7/8/2024)       No Known Allergies

## 2024-09-08 PROBLEM — Z12.83 SKIN CANCER SCREENING: Status: ACTIVE | Noted: 2020-11-01

## 2025-02-01 ENCOUNTER — HEALTH MAINTENANCE LETTER (OUTPATIENT)
Age: 89
End: 2025-02-01

## 2025-03-01 ENCOUNTER — HEALTH MAINTENANCE LETTER (OUTPATIENT)
Age: 89
End: 2025-03-01

## 2025-04-22 ENCOUNTER — OFFICE VISIT (OUTPATIENT)
Dept: DERMATOLOGY | Facility: CLINIC | Age: 89
End: 2025-04-22
Payer: MEDICARE

## 2025-04-22 DIAGNOSIS — R52 PAIN: ICD-10-CM

## 2025-04-22 DIAGNOSIS — H00.011 HORDEOLUM EXTERNUM OF RIGHT UPPER EYELID: Primary | ICD-10-CM

## 2025-04-22 DIAGNOSIS — H00.012 HORDEOLUM EXTERNUM OF RIGHT LOWER EYELID: ICD-10-CM

## 2025-04-22 RX ORDER — DOXYCYCLINE 100 MG/1
100 CAPSULE ORAL 2 TIMES DAILY
Qty: 14 CAPSULE | Refills: 0 | Status: SHIPPED | OUTPATIENT
Start: 2025-04-22 | End: 2025-04-29

## 2025-04-22 ASSESSMENT — PAIN SCALES - GENERAL: PAINLEVEL_OUTOF10: NO PAIN (0)

## 2025-04-22 NOTE — PATIENT INSTRUCTIONS
Take doxycycline 100mg twice daily  - avoid sun, or wear sunscreen and sun protective clothing when outside, this medicine will make you more likely to get a sunburn while taking  - take with food to avoid nausea  - take with a glass of water and remain upright for 30 minutes after to avoid esophageal irritation

## 2025-04-22 NOTE — LETTER
"4/22/2025       RE: Gerard Manuel  [address] [place] [address]     Dear Colleague,    Thank you for referring your patient, Gerard Manuel, to the [facility]ERMATOLOGY [facility] [place] at [facility]. Please see a copy of my visit note below.    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  - ILK-20 for webbing at R lateral canthus 5/7/24, ILK-40: 6/4/24   # Hx NMSC  - BCC, \"right nose,\" s/p MMS 2015   - SCC, left hand, s/p MMS 2018   # AKs: cryotherapy performed to R helix  # Tinea pedis   - Previous treatment: econazole 1% cream BID   # Verrucae Vulgaris, right front parietal scalp, s/p cryotherapy 12/11/23   # Stye, right lateral canthus  - 7-day course doxycycline 100mg BID initiated 4/22/25    ____________________________________________    Assessment & Plan:   # MIS, R Zygoma- s/p MMS 10/30/23, complex w/ FTSG, lateral canthopexy of R Eye     # Stye, right lateral canthus at site of previous lateral canthopexy with Mohs for MIS 10/30/23   # Pain  Area had previously been infected 04/19/2024 and treated by Per Garrett MD at [place]. Culture grew 1+ Staphylococcus epidermidis, 1+ Corynbacterium sp., HSV1/2 negative. Completed cycle of doxycycline to resolution of symptoms. Today physical exam shows erythematous pustule at right lateral canthus most consistent with a stye. Discussed treatment options, pt endorses tenderness and concern for lesion  - Recommend hot compresses as needed  - Doxycycline 100mg BID 7-day course    # Wound, right superior helix  - Reassured benign etiology   - Pt to monitor for healing    Procedures Performed:   None      Follow-up: Pt to contact in 3 days via MyChart or Phone, also has FBSE scheduled with Dr. Stone 5/13/2025    Staff and Medical Student:     Medical Student:  I saw and " "discussed the patient with the attending physician, Dr. Guanaco Martinez  MS3, [employer]    ____________________________________________    CC: No chief complaint on file.      HPI:  Mr. Gerard Manuel is a(n) 89 year old male who presents today as a return patient for issue with scar from surgery to eyelid 1-1/2 years ago. Last seen by dermatology 08/22/2024 (in person with Dr. Stone). Last seen by Dr. Guanaco Serrano 9/13/2022. He reports the scar region around his eye has become pink and swollen over the last couple of days. It is not impacting his vision. He used a warm pack this morning, unclear if any improvement. He had a surgery last Tuesday 4/15/2025 for a pacemaker battery replacement and initially wondered if it was swollen due to sleeping on the right side of his face. Of note, he had a local infection in this area last June 2024 that drained. It was treated at [place] with a course of doxycycline. Prior to the past few days there has been some intermittent itching of the lesion since the June 2024 infection but nothing similar.     Also has concern for lesion on right superior helix. Reports that he had a \"horn\" there that was reported to be benign. A few days ago his wife noticed that the horn was gone and the lesion was bleeding. He is unsure if he nicked or bumped it on anything. It is non-tender to palpation. He is on Eliquis.     He does have a FBSE scheduled in May 2025 with Dr. Stone.     Patient is otherwise feeling well, without additional skin concerns.    Labs Reviewed:  N/A    Physical Exam:  Vitals: There were no vitals taken for this visit.  SKIN: Focused examination of right face and right ear was performed.  - Pink, erythematous pustule on right lateral canthus over previous melanoma scar  - Scabbed cut of superior helix, appears mostly healed   - No other lesions of concern on areas examined.     Medications:  Current Outpatient Medications "   Medication Sig Dispense Refill     atenolol (TENORMIN) 25 MG tablet Take 50 mg by mouth daily       econazole nitrate 1 % external cream APPLY A THIN LAYER TO BOTTOM OF FEET TWICE DAILY AFTER WASHING THE FEET. DO NOT FORGET TO APPLY BETWEEN THE TOES (Patient not taking: Reported on 7/8/2024) 85 g 10     ELIQUIS ANTICOAGULANT 5 MG tablet        rosuvastatin (CRESTOR) 20 MG tablet 20 mg       terbinafine (LAMISIL) 250 MG tablet Take 250 mg by mouth daily (Patient not taking: Reported on 7/8/2024)       No current facility-administered medications for this visit.        Past Medical History:   Patient Active Problem List   Diagnosis     Blepharitis     BCC (basal cell carcinoma), face     Inflamed seborrheic keratosis     Tinea pedis     History of nonmelanoma skin cancer     Seborrheic keratoses, inflamed     Hypertrophic scar     Neoplasm of uncertain behavior of skin     Actinic keratosis     Skin cancer screening     Multiple benign nevi     Seborrheic keratoses     AK (actinic keratosis)     Neoplasm of unspecified behavior of bone, soft tissue, and skin     Melanoma in situ (H)     Past Medical History:   Diagnosis Date     Arthritis      Atrial flutter (H)      Blepharitis      Cataracts, both eyes      Hypertension     on meds     MGD (meibomian gland dysfunction)      Pacemaker        CC Referred Self, MD  No address on file on close of this encounter.        Again, thank you for allowing me to participate in the care of your patient.      Sincerely,    Guanaco Serrano MD

## 2025-04-22 NOTE — NURSING NOTE
Dermatology Rooming Note    Gerard Manuel's goals for this visit include:   Chief Complaint   Patient presents with    Derm Problem     Spot check on R eyelid. Mohs procedure done on site, was removed in 2023 and got infected. Treated with antibiotics and it has been getting red and swelling     Cj Dorman - EMT

## 2025-04-22 NOTE — PROGRESS NOTES
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  - ILK-20 for webbing at R lateral canthus 5/7/24, ILK-40: 6/4/24   # Hx NMSC  - BCC, \"right nose,\" s/p MMS 2015   - SCC, left hand, s/p MMS 2018   # AKs: cryotherapy performed to R helix  # Tinea pedis   - Previous treatment: econazole 1% cream BID   # Verrucae Vulgaris, right front parietal scalp, s/p cryotherapy 12/11/23   # Stye, right lateral canthus  - 7-day course doxycycline 100mg BID initiated 4/22/25    ____________________________________________    Assessment & Plan:   # MIS, R Zygoma- s/p MMS 10/30/23, complex w/ FTSG, lateral canthopexy of R Eye     # Stye, right lateral canthus at site of previous lateral canthopexy with Mohs for MIS 10/30/23   # Pain  Area had previously been infected 04/19/2024 and treated by Per Garrett MD at [place]. Culture grew 1+ Staphylococcus epidermidis, 1+ Corynbacterium sp., HSV1/2 negative. Completed cycle of doxycycline to resolution of symptoms. Today physical exam shows erythematous pustule at right lateral canthus most consistent with a stye. Discussed treatment options, pt endorses tenderness and concern for lesion  - Recommend hot compresses as needed  - Doxycycline 100mg BID 7-day course    # Wound, right superior helix  - Reassured benign etiology   - Pt to monitor for healing    Procedures Performed:   None      Follow-up: Pt to contact in 3 days via [employer] or Phone, also has FBSE scheduled with Dr. Stone 5/13/2025    Staff and Medical Student:     Medical Student:  I saw and discussed the patient with the attending physician, Dr. Guanaco Martinez  MS3, [employer]    ____________________________________________    CC: No chief complaint on file.      HPI:  Mr. Gerard Manuel is a(n) 89 year old male who presents today as a return patient for issue with " "scar from surgery to eyelid 1-1/2 years ago. Last seen by dermatology 08/22/2024 (in person with Dr. Stone). Last seen by Dr. Guanaco Serrano 9/13/2022. He reports the scar region around his eye has become pink and swollen over the last couple of days. It is not impacting his vision. He used a warm pack this morning, unclear if any improvement. He had a surgery last Tuesday 4/15/2025 for a pacemaker battery replacement and initially wondered if it was swollen due to sleeping on the right side of his face. Of note, he had a local infection in this area last June 2024 that drained. It was treated at [place] with a course of doxycycline. Prior to the past few days there has been some intermittent itching of the lesion since the June 2024 infection but nothing similar.     Also has concern for lesion on right superior helix. Reports that he had a \"horn\" there that was reported to be benign. A few days ago his wife noticed that the horn was gone and the lesion was bleeding. He is unsure if he nicked or bumped it on anything. It is non-tender to palpation. He is on Eliquis.     He does have a FBSE scheduled in May 2025 with Dr. Stone.     Patient is otherwise feeling well, without additional skin concerns.    Labs Reviewed:  N/A    Physical Exam:  Vitals: There were no vitals taken for this visit.  SKIN: Focused examination of right face and right ear was performed.  - Pink, erythematous pustule on right lateral canthus over previous melanoma scar  - Scabbed cut of superior helix, appears mostly healed   - No other lesions of concern on areas examined.     Medications:  Current Outpatient Medications   Medication Sig Dispense Refill    atenolol (TENORMIN) 25 MG tablet Take 50 mg by mouth daily      econazole nitrate 1 % external cream APPLY A THIN LAYER TO BOTTOM OF FEET TWICE DAILY AFTER WASHING THE FEET. DO NOT FORGET TO APPLY BETWEEN THE TOES (Patient not taking: Reported on 7/8/2024) 85 g 10    ELIQUIS ANTICOAGULANT 5 MG " tablet       rosuvastatin (CRESTOR) 20 MG tablet 20 mg      terbinafine (LAMISIL) 250 MG tablet Take 250 mg by mouth daily (Patient not taking: Reported on 7/8/2024)       No current facility-administered medications for this visit.        Past Medical History:   Patient Active Problem List   Diagnosis    Blepharitis    BCC (basal cell carcinoma), face    Inflamed seborrheic keratosis    Tinea pedis    History of nonmelanoma skin cancer    Seborrheic keratoses, inflamed    Hypertrophic scar    Neoplasm of uncertain behavior of skin    Actinic keratosis    Skin cancer screening    Multiple benign nevi    Seborrheic keratoses    AK (actinic keratosis)    Neoplasm of unspecified behavior of bone, soft tissue, and skin    Melanoma in situ (H)     Past Medical History:   Diagnosis Date    Arthritis     Atrial flutter (H)     Blepharitis     Cataracts, both eyes     Hypertension     on meds    MGD (meibomian gland dysfunction)     Pacemaker        CC Referred Self, MD  No address on file on close of this encounter.

## 2025-05-13 ENCOUNTER — OFFICE VISIT (OUTPATIENT)
Dept: DERMATOLOGY | Facility: CLINIC | Age: 89
End: 2025-05-13
Payer: MEDICARE

## 2025-05-13 DIAGNOSIS — L01.00 IMPETIGO: Primary | ICD-10-CM

## 2025-05-13 DIAGNOSIS — Z85.828 HISTORY OF NONMELANOMA SKIN CANCER: ICD-10-CM

## 2025-05-13 PROCEDURE — 99214 OFFICE O/P EST MOD 30 MIN: CPT | Mod: GC | Performed by: DERMATOLOGY

## 2025-05-13 PROCEDURE — 1126F AMNT PAIN NOTED NONE PRSNT: CPT | Performed by: DERMATOLOGY

## 2025-05-13 RX ORDER — DOXYCYCLINE 100 MG/1
100 CAPSULE ORAL 2 TIMES DAILY
Qty: 14 CAPSULE | Refills: 0 | Status: SHIPPED | OUTPATIENT
Start: 2025-05-13 | End: 2025-05-20

## 2025-05-13 ASSESSMENT — PAIN SCALES - GENERAL: PAINLEVEL_OUTOF10: NO PAIN (0)

## 2025-05-13 NOTE — PROGRESS NOTES
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 Tinea pedis - resolved  - Previous treatment: econazole 1% cream BID   5. Recurrent Hordeolum Infection  - Previous Doxycyline 100mg BID x7 days  ______________________________________    Impression/Plan:    #History of NMSC (BCC 2015 to right side of nose), (SCC to left hand 2018)  #History of Melanoma in situ (Right Zygoma 2024)  No evidence of recurrence on exam and scarring from MMS is well healed  -Continue FBSE q 12 months     #Benign skin lesions, scattered hyperkeratotic papules that appear stuck on (Sks), hyperpigmented patches (solar lentinges), vascular appearing papules (cherry angiomas), and pigmented and skin colored papules (nevi). The pigmented lesion in the left and right sunny appears benign on dermoscopy.   -continue to observe  -Encouraged sun protective behaviors and sunscreen use.      #Recurrent Hordeolum (stye) / Infection of eyelid.  - will prescribe doxycycline 100mg BID x7 days; patient understands to take if infection were to recur  - advised to begin warm compresses to the area as soon as he notices the stye forming    Follow-up in 1 year.    Staff Involved:     Seen and staffed with MD Diann Rodriguez MD  Internal Medicine - Pediatrics Resident, PGY-4    I have seen and examined this patient and agree with the assessment and plan as documented in the resident's note.    Basil Stone MD  Dermatology Attending    __________________________________________     CC:   Chief Complaint   Patient presents with    Derm Problem     FBSE- spot of concern R eye, stye. Spots of concern on L temple, R postauricular scalp       History of Present Illness:  Mr. Gerard Manuel is a 89 year old male who " presents for FBSE in setting of melanoma in situ treated with Mohs on 10/30/2024.    He has been going well since last seen. Diagnosed with infection of stye about a month ago for which he took Doxycycline 100mg BID x7 days. This is the second time this has occurred.     There are no new lesions that he is concerned about.     Physical exam:  Vitals: There were no vitals taken for this visit.  GEN: well developed, well-nourished, in no acute distress, in a pleasant mood.     Skin: A full skin exam was performed. Specifically, this includes the head/face and neck, upper extremities, breasts, chest, back, abdomen, lower extremities, genitalia, and buttocks. Patient consent was confirmed before performing sensitive portions of the exam.  -Scattered hyperkeratotic papules that appear stuck on (Sks), hyperpigmented patches (solar lentinges), vascular appearing papules (cherry angiomas), and pigmented and skin colored papules (nevi).   -Hyperpigmented macule to the right and left sunny     Past Medical History:   Past Medical History:   Diagnosis Date    Arthritis     Atrial flutter (H)     Blepharitis     Cataracts, both eyes     Hypertension     on meds    MGD (meibomian gland dysfunction)     Pacemaker      Past Surgical History:   Procedure Laterality Date    APPENDECTOMY      CARDIAC SURGERY      CABGx4    CATARACT IOL, RT/LT Left 07/09/2018    CATARACT IOL, RT/LT Right 07/23/2018    MOHS MICROGRAPHIC PROCEDURE      PHACOEMULSIFICATION CLEAR CORNEA WITH STANDARD INTRAOCULAR LENS IMPLANT Left 7/9/2018    Procedure: PHACOEMULSIFICATION CLEAR CORNEA WITH STANDARD INTRAOCULAR LENS IMPLANT;  LEFT EYE PHACOEMULSIFICATION CLEAR CORNEA WITH STANDARD INTRAOCULAR LENS IMPLANT ;  Surgeon: Gerardo Peña MD;  Location: [facility]    PHACOEMULSIFICATION CLEAR CORNEA WITH STANDARD INTRAOCULAR LENS IMPLANT Right 7/23/2018    Procedure: PHACOEMULSIFICATION CLEAR CORNEA WITH STANDARD INTRAOCULAR LENS IMPLANT;  RIGHT EYE  PHACOEMULSIFICATION CLEAR CORNEA WITH STANDARD INTRAOCULAR LENS IMPLANT ;  Surgeon: Gerardo Peña MD;  Location: [facility]    Quadruple Bypass Surgery  1992       Social History:   reports that he has never smoked. He has never used smokeless tobacco. He reports that he does not drink alcohol and does not use drugs.    Family History:  Family History   Problem Relation Age of Onset    Cancer Brother         skin cancer    Skin Cancer Brother     Skin Cancer Sister     Glaucoma No family hx of     Macular Degeneration No family hx of     Melanoma No family hx of        Medications:  Current Outpatient Medications   Medication Sig Dispense Refill    atenolol (TENORMIN) 25 MG tablet Take 50 mg by mouth daily      ELIQUIS ANTICOAGULANT 5 MG tablet       rosuvastatin (CRESTOR) 20 MG tablet 20 mg       No Known Allergies

## 2025-05-13 NOTE — NURSING NOTE
Dermatology Rooming Note    Gerard Manuel's goals for this visit include:   Chief Complaint   Patient presents with    Derm Problem     FBSE- spot of concern R eye, stye. Spots of concern on L temple, R postauricular scalp     PRACHI Rolon

## 2025-05-13 NOTE — Clinical Note
"5/13/2025       RE: Gerard Manuel  [address] [place] [address]     Dear Colleague,    Thank you for referring your patient, Gerard Manuel, to the [facility] [place] at [facility]. Please see a copy of my visit note below.    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 Tinea pedis   - Previous treatment: econazole 1% cream BID   ______________________________________    Impression/Plan:    #History of NMSC (BCC 2015 to right side of nose), (SCC to left hand 2018)  #History of Melanoma in situ (Right Zygoma 2024)  No evidence of recurrence on exam and scarring from MMS is acceptable to the patient.   -Continue FBSE q 6 months     #Benign skin lesions, scattered hyperkeratotic papules that appear stuck on (Sks), hyperpigmented patches (solar lentinges), vascular appearing papules (cherry angiomas), and pigmented and skin colored papules (nevi).  Of note, the pigmented lesion in the left sunny appears benign on dermoscopy and grossly (ie, lack of ABCDE features, not strikingly an ugly duckling sign). Will observe for now.    -Encouraged sun protective behaviors and sunscreen use.      Follow-up in 6 months.     Staff Involved:  Mikey Black II, MPH  Medical Student Progress Note     Seen and staffed with MD Diann Rodriguez MD  Internal Medicine - Pediatrics Resident, PGY-4  [facility]  Available on [employer]       CC:   Chief Complaint   Patient presents with     Derm Problem     FBSE- spot of concern R eye, stye. Spots of concern on L temple, R postauricular scalp       History of Present Illness:  Mr. Gerard Manuel is a 89 year old male who presents for " FBSE in setting of melanoma in situ treated with Mohs on 10/30/2024. Pt reports that he had kenalog injections to the right zygoma scar in area of melanoma in situ with improvement (that is, no longer palpable and cosmetically). He has no lesions of concern.      Physical exam:  Vitals: There were no vitals taken for this visit.  GEN: well developed, well-nourished, in no acute distress, in a pleasant mood.     SKIN:   -Scattered hyperkeratotic papules that appear stuck on (Sks), hyperpigmented patches (solar lentinges), vascular appearing papules (cherry angiomas), and pigmented and skin colored papules (nevi).   -Hyperpigmented macule to the left sunny as shown:     Past Medical History:   Past Medical History:   Diagnosis Date     Arthritis      Atrial flutter (H)      Blepharitis      Cataracts, both eyes      Hypertension     on meds     MGD (meibomian gland dysfunction)      Pacemaker      Past Surgical History:   Procedure Laterality Date     APPENDECTOMY       CARDIAC SURGERY      CABGx4     CATARACT IOL, RT/LT Left 07/09/2018     CATARACT IOL, RT/LT Right 07/23/2018     MOHS MICROGRAPHIC PROCEDURE       PHACOEMULSIFICATION CLEAR CORNEA WITH STANDARD INTRAOCULAR LENS IMPLANT Left 7/9/2018    Procedure: PHACOEMULSIFICATION CLEAR CORNEA WITH STANDARD INTRAOCULAR LENS IMPLANT;  LEFT EYE PHACOEMULSIFICATION CLEAR CORNEA WITH STANDARD INTRAOCULAR LENS IMPLANT ;  Surgeon: Gerardo Peña MD;  Location: [facility]     PHACOEMULSIFICATION CLEAR CORNEA WITH STANDARD INTRAOCULAR LENS IMPLANT Right 7/23/2018    Procedure: PHACOEMULSIFICATION CLEAR CORNEA WITH STANDARD INTRAOCULAR LENS IMPLANT;  RIGHT EYE PHACOEMULSIFICATION CLEAR CORNEA WITH STANDARD INTRAOCULAR LENS IMPLANT ;  Surgeon: Gerardo Peña MD;  Location: [facility]     Quadruple Bypass Surgery  1992       Social History:   reports that he has never smoked. He has never used smokeless tobacco. He reports that he does not drink alcohol and does not use  drugs.    Family History:  Family History   Problem Relation Age of Onset     Cancer Brother         skin cancer     Skin Cancer Brother      Skin Cancer Sister      Glaucoma No family hx of      Macular Degeneration No family hx of      Melanoma No family hx of        Medications:  Current Outpatient Medications   Medication Sig Dispense Refill     atenolol (TENORMIN) 25 MG tablet Take 50 mg by mouth daily       ELIQUIS ANTICOAGULANT 5 MG tablet        rosuvastatin (CRESTOR) 20 MG tablet 20 mg       No Known Allergies                  Again, thank you for allowing me to participate in the care of your patient.      Sincerely,    Basil Stone MD

## 2025-06-03 PROBLEM — L01.00 IMPETIGO: Status: ACTIVE | Noted: 2025-06-03

## 2025-06-09 ENCOUNTER — TELEPHONE (OUTPATIENT)
Dept: DERMATOLOGY | Facility: CLINIC | Age: 89
End: 2025-06-09
Payer: MEDICARE

## 2025-06-09 NOTE — TELEPHONE ENCOUNTER
6/9 Left Voicemail (1st Attempt) and Sent Mychart (1st Attempt) for the patient to call back and schedule the following:    Appointment type: Return Dermatology  Provider: Chase  Return date: 05/13/2026  Specialty phone number: [phone]  Additional appointment(s) needed: na  Additonal Notes: na

## (undated) DEVICE — GLOVE PROTEXIS MICRO 8.0  2D73PM80

## (undated) DEVICE — GLOVE PROTEXIS MICRO 7.5  2D73PM75

## (undated) DEVICE — EYE SHIELD PLASTIC

## (undated) DEVICE — Device

## (undated) DEVICE — EYE PACK BVI READYPAK KIT #1

## (undated) DEVICE — TAPE MICROPORE 1"X1.5YD 1530S-1

## (undated) DEVICE — CHANG HYDRODISSECTOR CANNULA

## (undated) DEVICE — PACK CATARACT CUSTOM SO DALE SEY32CTFCX

## (undated) DEVICE — GLOVE PROTEXIS MICRO 8.5  2D73PM85

## (undated) DEVICE — EYE PACK CUSTOM ANTERIOR 30DEG TIP CENTURION PPK6682-04

## (undated) DEVICE — TAPE MICROPORE 2"X1.5YD 1530S-2

## (undated) DEVICE — LINEN TOWEL PACK X5 5464

## (undated) DEVICE — EYE SOL BSS 15ML BOTTLE 65079515

## (undated) DEVICE — EYE TIP IRRIGATION & ASPIRATION POLYMER 35D BENT 8065751511

## (undated) DEVICE — SU ETHILON 10-0 CS160-6 12" 9000G

## (undated) DEVICE — EYE KNIFE SLIT XSTAR VISITEC 2.8MM 45DEG 373728

## (undated) DEVICE — GLOVE PROTEXIS MICRO 7.0  2D73PM70

## (undated) DEVICE — EYE SOL BSS 500ML

## (undated) DEVICE — GOWN LG DISP 9515

## (undated) RX ORDER — LIDOCAINE HYDROCHLORIDE 10 MG/ML
INJECTION, SOLUTION EPIDURAL; INFILTRATION; INTRACAUDAL; PERINEURAL
Status: DISPENSED
Start: 2018-07-09

## (undated) RX ORDER — WATER 10 ML/10ML
INJECTION INTRAMUSCULAR; INTRAVENOUS; SUBCUTANEOUS
Status: DISPENSED
Start: 2018-07-23

## (undated) RX ORDER — LIDOCAINE HYDROCHLORIDE 10 MG/ML
INJECTION, SOLUTION EPIDURAL; INFILTRATION; INTRACAUDAL; PERINEURAL
Status: DISPENSED
Start: 2018-07-23

## (undated) RX ORDER — FENTANYL CITRATE 50 UG/ML
INJECTION, SOLUTION INTRAMUSCULAR; INTRAVENOUS
Status: DISPENSED
Start: 2018-07-23

## (undated) RX ORDER — TRIAMCINOLONE ACETONIDE 40 MG/ML
INJECTION, SUSPENSION INTRA-ARTICULAR; INTRAMUSCULAR
Status: DISPENSED
Start: 2024-06-04

## (undated) RX ORDER — FENTANYL CITRATE 50 UG/ML
INJECTION, SOLUTION INTRAMUSCULAR; INTRAVENOUS
Status: DISPENSED
Start: 2018-07-09

## (undated) RX ORDER — TETRACAINE HYDROCHLORIDE 5 MG/ML
SOLUTION OPHTHALMIC
Status: DISPENSED
Start: 2018-07-23

## (undated) RX ORDER — TRIAMCINOLONE ACETONIDE 40 MG/ML
INJECTION, SUSPENSION INTRA-ARTICULAR; INTRAMUSCULAR
Status: DISPENSED
Start: 2024-05-07

## (undated) RX ORDER — MOXIFLOXACIN 5 MG/ML
SOLUTION/ DROPS OPHTHALMIC
Status: DISPENSED
Start: 2018-07-23

## (undated) RX ORDER — TETRACAINE HYDROCHLORIDE 5 MG/ML
SOLUTION OPHTHALMIC
Status: DISPENSED
Start: 2018-07-09